# Patient Record
Sex: FEMALE | Race: WHITE | NOT HISPANIC OR LATINO | Employment: UNEMPLOYED | URBAN - METROPOLITAN AREA
[De-identification: names, ages, dates, MRNs, and addresses within clinical notes are randomized per-mention and may not be internally consistent; named-entity substitution may affect disease eponyms.]

---

## 2017-05-04 ENCOUNTER — OFFICE VISIT (OUTPATIENT)
Dept: OBSTETRICS AND GYNECOLOGY | Facility: CLINIC | Age: 27
End: 2017-05-04

## 2017-05-04 ENCOUNTER — PROCEDURE VISIT (OUTPATIENT)
Dept: OBSTETRICS AND GYNECOLOGY | Facility: CLINIC | Age: 27
End: 2017-05-04

## 2017-05-04 VITALS
DIASTOLIC BLOOD PRESSURE: 58 MMHG | SYSTOLIC BLOOD PRESSURE: 108 MMHG | WEIGHT: 111 LBS | HEIGHT: 62 IN | BODY MASS INDEX: 20.43 KG/M2
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
## 2017-05-04 DIAGNOSIS — Z34.81 ENCOUNTER FOR SUPERVISION OF OTHER NORMAL PREGNANCY IN FIRST TRIMESTER: ICD-10-CM

## 2017-05-04 DIAGNOSIS — Z98.891 HISTORY OF CESAREAN DELIVERY: ICD-10-CM

## 2017-05-04 DIAGNOSIS — Z36.87 UNSURE OF LMP (LAST MENSTRUAL PERIOD) AS REASON FOR ULTRASOUND SCAN: ICD-10-CM

## 2017-05-04 DIAGNOSIS — Z15.89 HETEROZYGOUS MTHFR MUTATION C677T: ICD-10-CM

## 2017-05-04 DIAGNOSIS — Z01.419 WELL FEMALE EXAM WITH ROUTINE GYNECOLOGICAL EXAM: Primary | ICD-10-CM

## 2017-05-04 DIAGNOSIS — Z13.9 SCREENING: ICD-10-CM

## 2017-05-04 DIAGNOSIS — Z12.4 PAP SMEAR FOR CERVICAL CANCER SCREENING: ICD-10-CM

## 2017-05-04 DIAGNOSIS — Z11.3 SCREEN FOR STD (SEXUALLY TRANSMITTED DISEASE): ICD-10-CM

## 2017-05-04 PROBLEM — O46.8X9 SUBCHORIONIC BLEED: Status: ACTIVE | Noted: 2017-05-04

## 2017-05-04 PROBLEM — Z34.90 SUPERVISION OF NORMAL PREGNANCY: Status: ACTIVE | Noted: 2017-05-04

## 2017-05-04 PROBLEM — Z87.59 HISTORY OF PLACENTA ABRUPTION: Status: ACTIVE | Noted: 2017-05-04

## 2017-05-04 PROBLEM — O41.8X90 SUBCHORIONIC BLEED: Status: ACTIVE | Noted: 2017-05-04

## 2017-05-04 LAB
ABDOMINAL CIRCUMFERENCE: NORMAL MM
BILIRUB BLD-MCNC: NEGATIVE MG/DL
CLARITY, POC: CLEAR
COLOR UR: YELLOW
FEMUR LENGTH: NORMAL MM
FET BPD.MEAN FROM CEREB DIAM: NORMAL MM
GLUCOSE UR STRIP-MCNC: NEGATIVE MG/DL
HEAD CIRCUMFERENCE: NORMAL MM
KETONES UR QL: NEGATIVE
LEUKOCYTE EST, POC: ABNORMAL
NITRITE UR-MCNC: NEGATIVE MG/ML
PH UR: 7.5 [PH] (ref 5–8)
PROT UR STRIP-MCNC: NEGATIVE MG/DL
RBC # UR STRIP: NEGATIVE /UL
SP GR UR: 1.02 (ref 1–1.03)
UROBILINOGEN UR QL: NORMAL

## 2017-05-04 PROCEDURE — 81002 URINALYSIS NONAUTO W/O SCOPE: CPT | Performed by: OBSTETRICS & GYNECOLOGY

## 2017-05-04 PROCEDURE — 99385 PREV VISIT NEW AGE 18-39: CPT | Performed by: OBSTETRICS & GYNECOLOGY

## 2017-05-04 PROCEDURE — 76817 TRANSVAGINAL US OBSTETRIC: CPT | Performed by: OBSTETRICS & GYNECOLOGY

## 2017-05-04 RX ORDER — CYCLOBENZAPRINE HCL 10 MG
10 TABLET ORAL EVERY 8 HOURS PRN
Qty: 30 TABLET | Refills: 1 | Status: SHIPPED | OUTPATIENT
Start: 2017-05-04 | End: 2017-05-14

## 2017-05-04 RX ORDER — METHYLERGONOVINE MALEATE 0.2 MG/1
TABLET ORAL
COMMUNITY
Start: 2017-01-27 | End: 2017-05-04

## 2017-05-05 LAB
ABO GROUP BLD: NORMAL
BLD GP AB SCN SERPL QL: NEGATIVE
CONV .: NORMAL
CYTOLOGIST CVX/VAG CYTO: NORMAL
CYTOLOGY CVX/VAG DOC THIN PREP: NORMAL
DX ICD CODE: NORMAL
ERYTHROCYTE [DISTWIDTH] IN BLOOD BY AUTOMATED COUNT: 14.5 % (ref 11.7–13)
HBA1C MFR BLD: 4.88 % (ref 4.8–5.6)
HBV SURFACE AG SERPL QL IA: NEGATIVE
HCT VFR BLD AUTO: 39.4 % (ref 35.6–45.5)
HCV AB S/CO SERPL IA: <0.1 S/CO RATIO (ref 0–0.9)
HGB A MFR BLD: 97.2 % (ref 94–98)
HGB A2 MFR BLD COLUMN CHROM: 2.8 % (ref 0.7–3.1)
HGB BLD-MCNC: 12.7 G/DL (ref 11.9–15.5)
HGB C MFR BLD: 0 %
HGB F MFR BLD: 0 % (ref 0–2)
HGB FRACT BLD-IMP: NORMAL
HGB S BLD QL SOLY: NEGATIVE
HGB S MFR BLD: 0 %
HIV 1 & 2 AB SER-IMP: NORMAL
HIV 1+2 AB+HIV1 P24 AG SERPL QL IA: NON REACTIVE
MCH RBC QN AUTO: 28 PG (ref 26.9–32)
MCHC RBC AUTO-ENTMCNC: 32.2 G/DL (ref 32.4–36.3)
MCV RBC AUTO: 87 FL (ref 80.5–98.2)
OTHER STN SPEC: NORMAL
PATH REPORT.FINAL DX SPEC: NORMAL
PLATELET # BLD AUTO: 263 10*3/MM3 (ref 140–500)
RBC # BLD AUTO: 4.53 10*6/MM3 (ref 3.9–5.2)
RH BLD: POSITIVE
RPR SER QL: NORMAL
RUBV IGG SERPL IA-ACNC: 1.1 INDEX
STAT OF ADQ CVX/VAG CYTO-IMP: NORMAL
VZV IGG SER IA-ACNC: 1838 INDEX
WBC # BLD AUTO: 7.45 10*3/MM3 (ref 4.5–10.7)

## 2017-05-06 LAB
BACTERIA UR CULT: NO GROWTH
BACTERIA UR CULT: NORMAL

## 2017-05-07 LAB
C TRACH RRNA SPEC QL NAA+PROBE: NEGATIVE
N GONORRHOEA RRNA SPEC QL NAA+PROBE: NEGATIVE
T VAGINALIS RRNA SPEC QL NAA+PROBE: NEGATIVE

## 2017-05-08 ENCOUNTER — TELEPHONE (OUTPATIENT)
Dept: OBSTETRICS AND GYNECOLOGY | Facility: CLINIC | Age: 27
End: 2017-05-08

## 2017-05-10 ENCOUNTER — TELEPHONE (OUTPATIENT)
Dept: OBSTETRICS AND GYNECOLOGY | Facility: CLINIC | Age: 27
End: 2017-05-10

## 2017-05-10 RX ORDER — PROMETHAZINE HYDROCHLORIDE 25 MG/1
25 TABLET ORAL EVERY 6 HOURS PRN
Qty: 30 TABLET | Refills: 2 | Status: SHIPPED | OUTPATIENT
Start: 2017-05-10 | End: 2017-09-01 | Stop reason: SDUPTHER

## 2017-05-10 RX ORDER — PROMETHAZINE HYDROCHLORIDE 25 MG/1
25 SUPPOSITORY RECTAL EVERY 6 HOURS PRN
Qty: 12 SUPPOSITORY | Refills: 1 | Status: SHIPPED | OUTPATIENT
Start: 2017-05-10 | End: 2017-06-06

## 2017-05-10 RX ORDER — DOXYLAMINE SUCCINATE AND PYRIDOXINE HYDROCHLORIDE, DELAYED RELEASE TABLETS 10 MG/10 MG 10; 10 MG/1; MG/1
2 TABLET, DELAYED RELEASE ORAL NIGHTLY PRN
Qty: 90 TABLET | Refills: 2 | Status: SHIPPED | OUTPATIENT
Start: 2017-05-10 | End: 2017-06-06

## 2017-06-06 ENCOUNTER — INITIAL PRENATAL (OUTPATIENT)
Dept: OBSTETRICS AND GYNECOLOGY | Facility: CLINIC | Age: 27
End: 2017-06-06

## 2017-06-06 VITALS — DIASTOLIC BLOOD PRESSURE: 64 MMHG | BODY MASS INDEX: 20.12 KG/M2 | SYSTOLIC BLOOD PRESSURE: 100 MMHG | WEIGHT: 110 LBS

## 2017-06-06 DIAGNOSIS — Z13.9 SCREENING: ICD-10-CM

## 2017-06-06 DIAGNOSIS — Z98.891 HISTORY OF CESAREAN DELIVERY: ICD-10-CM

## 2017-06-06 DIAGNOSIS — Z15.89 HETEROZYGOUS MTHFR MUTATION C677T: ICD-10-CM

## 2017-06-06 DIAGNOSIS — Z82.49 FAMILY HISTORY OF HEART BLOCK: ICD-10-CM

## 2017-06-06 DIAGNOSIS — Z34.80 ENCOUNTER FOR SUPERVISION OF OTHER NORMAL PREGNANCY: Primary | ICD-10-CM

## 2017-06-06 DIAGNOSIS — Z87.59 HISTORY OF PLACENTA ABRUPTION: ICD-10-CM

## 2017-06-06 PROBLEM — Z82.79 FAMILY HISTORY OF CONGENITAL HEART DEFECT: Status: ACTIVE | Noted: 2017-06-06

## 2017-06-06 LAB
BILIRUB BLD-MCNC: NEGATIVE MG/DL
CLARITY, POC: CLEAR
COLOR UR: YELLOW
GLUCOSE UR STRIP-MCNC: NEGATIVE MG/DL
KETONES UR QL: NEGATIVE
LEUKOCYTE EST, POC: NEGATIVE
NITRITE UR-MCNC: NEGATIVE MG/ML
PH UR: 7 [PH] (ref 5–8)
PROT UR STRIP-MCNC: NEGATIVE MG/DL
RBC # UR STRIP: NEGATIVE /UL
SP GR UR: 1.02 (ref 1–1.03)
UROBILINOGEN UR QL: NORMAL

## 2017-06-06 PROCEDURE — 81002 URINALYSIS NONAUTO W/O SCOPE: CPT | Performed by: OBSTETRICS & GYNECOLOGY

## 2017-06-06 PROCEDURE — 0502F SUBSEQUENT PRENATAL CARE: CPT | Performed by: OBSTETRICS & GYNECOLOGY

## 2017-06-06 RX ORDER — ASPIRIN 81 MG/1
81 TABLET ORAL DAILY
COMMUNITY
End: 2017-10-28 | Stop reason: HOSPADM

## 2017-06-06 NOTE — ASSESSMENT & PLAN NOTE
Brother with complete heart block is premature death  Sister with mitral valve prolapse  []  Fetal echo after 24 weeks

## 2017-06-06 NOTE — PROGRESS NOTES
Cc/no c/o/sc    OB intake completed  [x]  Reviewed dating  [x]  Reviewed medical history  [x]  Reviewed genetic and infection history  [x]  Obstetrical history reviewed/delivery notes requested.

## 2017-06-07 LAB
CARDIOLIPIN IGG SER IA-ACNC: <9 GPL U/ML (ref 0–14)
CARDIOLIPIN IGM SER IA-ACNC: <9 MPL U/ML (ref 0–12)
ENA SS-A AB SER-ACNC: <0.2 AI (ref 0–0.9)
ENA SS-B AB SER-ACNC: <0.2 AI (ref 0–0.9)

## 2017-06-08 ENCOUNTER — TELEPHONE (OUTPATIENT)
Dept: OBSTETRICS AND GYNECOLOGY | Facility: CLINIC | Age: 27
End: 2017-06-08

## 2017-06-08 NOTE — TELEPHONE ENCOUNTER
----- Message from Wm Barraza MD sent at 6/7/2017  9:14 PM EDT -----  Please call:  Screening labs for autoimmune disease that effect the heart are negative

## 2017-07-14 ENCOUNTER — ROUTINE PRENATAL (OUTPATIENT)
Dept: OBSTETRICS AND GYNECOLOGY | Facility: CLINIC | Age: 27
End: 2017-07-14

## 2017-07-14 VITALS — BODY MASS INDEX: 20.85 KG/M2 | WEIGHT: 114 LBS | SYSTOLIC BLOOD PRESSURE: 90 MMHG | DIASTOLIC BLOOD PRESSURE: 54 MMHG

## 2017-07-14 DIAGNOSIS — Z15.89 HETEROZYGOUS MTHFR MUTATION C677T: ICD-10-CM

## 2017-07-14 DIAGNOSIS — O99.519 ASTHMA AFFECTING PREGNANCY, ANTEPARTUM: ICD-10-CM

## 2017-07-14 DIAGNOSIS — Z13.9 SCREENING: ICD-10-CM

## 2017-07-14 DIAGNOSIS — O09.92 HIGH-RISK PREGNANCY SUPERVISION, SECOND TRIMESTER: Primary | ICD-10-CM

## 2017-07-14 DIAGNOSIS — I34.0 MITRAL REGURGITATION OF MOTHER DURING PREGNANCY: ICD-10-CM

## 2017-07-14 DIAGNOSIS — Z98.891 HISTORY OF CESAREAN DELIVERY: ICD-10-CM

## 2017-07-14 DIAGNOSIS — O99.419 MITRAL REGURGITATION OF MOTHER DURING PREGNANCY: ICD-10-CM

## 2017-07-14 DIAGNOSIS — J45.909 ASTHMA AFFECTING PREGNANCY, ANTEPARTUM: ICD-10-CM

## 2017-07-14 DIAGNOSIS — Z82.79 FAMILY HISTORY OF CONGENITAL HEART DEFECT: ICD-10-CM

## 2017-07-14 DIAGNOSIS — Z87.59 HISTORY OF PLACENTA ABRUPTION: ICD-10-CM

## 2017-07-14 PROBLEM — O09.90 HIGH-RISK PREGNANCY SUPERVISION: Status: ACTIVE | Noted: 2017-05-04

## 2017-07-14 PROBLEM — Z01.419 WELL FEMALE EXAM WITH ROUTINE GYNECOLOGICAL EXAM: Status: RESOLVED | Noted: 2017-05-04 | Resolved: 2017-07-14

## 2017-07-14 LAB
BILIRUB BLD-MCNC: NEGATIVE MG/DL
CLARITY, POC: CLEAR
COLOR UR: YELLOW
EXTERNAL GENETIC TESTING, MATERNAL BLOOD: NORMAL
GLUCOSE UR STRIP-MCNC: NEGATIVE MG/DL
KETONES UR QL: NEGATIVE
LEUKOCYTE EST, POC: ABNORMAL
NITRITE UR-MCNC: NEGATIVE MG/ML
PH UR: 7 [PH] (ref 5–8)
PROT UR STRIP-MCNC: NEGATIVE MG/DL
RBC # UR STRIP: NEGATIVE /UL
SP GR UR: 1.02 (ref 1–1.03)
UROBILINOGEN UR QL: NORMAL

## 2017-07-14 PROCEDURE — 81002 URINALYSIS NONAUTO W/O SCOPE: CPT | Performed by: OBSTETRICS & GYNECOLOGY

## 2017-07-14 PROCEDURE — 0502F SUBSEQUENT PRENATAL CARE: CPT | Performed by: OBSTETRICS & GYNECOLOGY

## 2017-07-14 NOTE — PROGRESS NOTES
Pt states no c/o  Patient has mitral regurg - followed by cardiology - next appt October - need records  H/o csection - repeat at 39 weeks - wants to avoid 12/19 due to sister passing at that age   MTHFR - cont ASA   H/o placental abruption  Family h/o congenital heart defect - to [facility] for consult and anatomy   Declines MS-AFP 4   3 weeks

## 2017-07-26 ENCOUNTER — HOSPITAL ENCOUNTER (OUTPATIENT)
Dept: ULTRASOUND IMAGING | Facility: HOSPITAL | Age: 27
Discharge: HOME | End: 2017-07-26
Attending: OBSTETRICS & GYNECOLOGY | Admitting: OBSTETRICS & GYNECOLOGY

## 2017-07-26 DIAGNOSIS — I34.0 MITRAL REGURGITATION OF MOTHER DURING PREGNANCY: ICD-10-CM

## 2017-07-26 DIAGNOSIS — O99.419 MITRAL REGURGITATION OF MOTHER DURING PREGNANCY: ICD-10-CM

## 2017-07-26 DIAGNOSIS — Z15.89 HETEROZYGOUS MTHFR MUTATION C677T: ICD-10-CM

## 2017-07-26 DIAGNOSIS — Z82.79 FAMILY HISTORY OF CONGENITAL HEART DEFECT: ICD-10-CM

## 2017-07-26 DIAGNOSIS — Z87.59 HISTORY OF PLACENTA ABRUPTION: ICD-10-CM

## 2017-07-26 PROCEDURE — 76819 FETAL BIOPHYS PROFIL W/O NST: CPT

## 2017-07-26 PROCEDURE — 76811 OB US DETAILED SNGL FETUS: CPT

## 2017-07-26 NOTE — CONSULTS
"Ms. Robles is referred by Dr. Le for M consultation on indication of family history of congenital heart disease.    27 G3 P 0111 at 18 4/7 per DENISE 17 L= 6      Prenatal course is significant for   Pt saw cardiologist Dr. Pierce two weeks ago.  Pt states she was told her mitral regug. is stable.  F/U appointment is scheduled in October.    PMH   Ob  G1   37 2/7  LTCS  Abruption  6# 10oz Thrombophilia W/U was neg except for MTHFR  G2  2017 SAB  G3 current    Ills  Asthma:  No flare ups in a long time.  Has not used an inhaler in > 1 year    Alls  NKMA    Ops  Prior c-s    Meds  Phenergan  ASA 81 mg  PNV    Fhx: is negative for mental retardation, trisomy 21, spina bifida, cystic fibrosis, [employer] ancestry.   Pt states she has been tested three times for CF but has not had a cystic fibrosis carrier screen.   I provided a copy of the pamphlet \"Carrier Testing for Common Genetic Diseases.\"  I briefly described cystic fibrosis and spinal muscle atrophy including availability of carrier screening for these conditions thru her obstetrician's office.    Regarding fhx heart disease:  Pt's brother  at age 28 in his sleep; he had pace maker since age 4  (Sister passed away age 24; pneumonia.)  Pt has mitral regurgitation  Brother has sinus bradycardia.  2 nephews have 1) PDA 2)aortic stenosis    O:  62\" 114#    General appearance:  A&Ox3 pleasant WF in mid 2nd trimester, appears comfortable      Abdomen:  Fundal height is appropriate for gestational age.  Uterus is nontender    US:  See report    A:  18 4/7 per DENISE 17  Hx abruption; 10-20% recurrence risk. Abruption is not predictable.  Thrombophilia evaluation was negative.  10-30% of the population has MTHFR; testing for MTHFR is no longer indicated.      Prior   Family history of heart anomalies.     P;  Suggest screening for Digeorge syndrome (22 q11.2 micro deletion) per extensive family history of cardiac problems.   Recommend evaluation " of interval growth at 28 weeks to include additional views of heart.     Evaluate interval growth at 28 weeks to include additional views of the heart at that time.     NICHD consensus conference recommendations for late , early term delivery (Obstet Gynecol 2011:118:323-33) did not address history of abruption. I suggest delivery at 36-37 weeks after BMZ, similar to delivery timing for other acute potentially castastrophic conditions such as placenta previa.      Recommend inform  care provider of the family history of congenital heart anomaly.  Due to known limitations of fetal echocardiogram,  echocardiogram is recommended prior to hospital discharge, as it is the most sensitive exam for congenital heart disease.    For billing purposes, duration of face to face consultation was approximately 30 minutes of which > 50% was devoted to patient counseling and coordination of care, exclusive of US exam.

## 2017-07-27 ENCOUNTER — TRANSCRIBE ORDERS (OUTPATIENT)
Dept: ADMINISTRATIVE | Facility: HOSPITAL | Age: 27
End: 2017-07-27

## 2017-07-27 DIAGNOSIS — Z82.79 FAMILY HISTORY OF CONGENITAL ANOMALIES: ICD-10-CM

## 2017-07-27 DIAGNOSIS — Z82.79 FAMILY HISTORY OF CONGENITAL ANOMALIES: Primary | ICD-10-CM

## 2017-08-03 ENCOUNTER — ROUTINE PRENATAL (OUTPATIENT)
Dept: OBSTETRICS AND GYNECOLOGY | Facility: CLINIC | Age: 27
End: 2017-08-03

## 2017-08-03 VITALS — WEIGHT: 113 LBS | BODY MASS INDEX: 20.67 KG/M2 | SYSTOLIC BLOOD PRESSURE: 98 MMHG | DIASTOLIC BLOOD PRESSURE: 60 MMHG

## 2017-08-03 DIAGNOSIS — Z98.891 HISTORY OF CESAREAN DELIVERY: ICD-10-CM

## 2017-08-03 DIAGNOSIS — O09.92 HIGH-RISK PREGNANCY SUPERVISION, SECOND TRIMESTER: Primary | ICD-10-CM

## 2017-08-03 DIAGNOSIS — Z15.89 HETEROZYGOUS MTHFR MUTATION C677T: ICD-10-CM

## 2017-08-03 DIAGNOSIS — O99.891 BACK PAIN AFFECTING PREGNANCY: ICD-10-CM

## 2017-08-03 DIAGNOSIS — O99.419 MITRAL REGURGITATION OF MOTHER DURING PREGNANCY: ICD-10-CM

## 2017-08-03 DIAGNOSIS — Z82.79 FAMILY HISTORY OF CONGENITAL HEART DEFECT: ICD-10-CM

## 2017-08-03 DIAGNOSIS — Z13.9 SCREENING: ICD-10-CM

## 2017-08-03 DIAGNOSIS — I34.0 MITRAL REGURGITATION OF MOTHER DURING PREGNANCY: ICD-10-CM

## 2017-08-03 DIAGNOSIS — J45.909 ASTHMA AFFECTING PREGNANCY, ANTEPARTUM: ICD-10-CM

## 2017-08-03 DIAGNOSIS — O99.519 ASTHMA AFFECTING PREGNANCY, ANTEPARTUM: ICD-10-CM

## 2017-08-03 DIAGNOSIS — Z87.59 HISTORY OF PLACENTA ABRUPTION: ICD-10-CM

## 2017-08-03 DIAGNOSIS — M54.9 BACK PAIN AFFECTING PREGNANCY: ICD-10-CM

## 2017-08-03 LAB
BILIRUB BLD-MCNC: NEGATIVE MG/DL
CLARITY, POC: CLEAR
COLOR UR: YELLOW
GLUCOSE UR STRIP-MCNC: NEGATIVE MG/DL
KETONES UR QL: NEGATIVE
LEUKOCYTE EST, POC: NEGATIVE
NITRITE UR-MCNC: NEGATIVE MG/ML
PH UR: 7.5 [PH] (ref 5–8)
PROT UR STRIP-MCNC: NEGATIVE MG/DL
RBC # UR STRIP: NEGATIVE /UL
SP GR UR: 1.02 (ref 1–1.03)
UROBILINOGEN UR QL: NORMAL

## 2017-08-03 PROCEDURE — 81002 URINALYSIS NONAUTO W/O SCOPE: CPT | Performed by: OBSTETRICS & GYNECOLOGY

## 2017-08-03 PROCEDURE — 0502F SUBSEQUENT PRENATAL CARE: CPT | Performed by: OBSTETRICS & GYNECOLOGY

## 2017-08-03 NOTE — PROGRESS NOTES
Cc/ back pain/sc  To PT for back pain - brochure given  Mild to mod regurg - see cards records - repeat ECHO 4 months   H/o congenital heart defect -  ECHO rec   Discussed DiGeorge testing - pat declines   H/o placental abruption - MFM rec delivery at 36-37 weeks after steroids   4 weeks

## 2017-09-01 ENCOUNTER — ROUTINE PRENATAL (OUTPATIENT)
Dept: OBSTETRICS AND GYNECOLOGY | Facility: CLINIC | Age: 27
End: 2017-09-01

## 2017-09-01 VITALS — BODY MASS INDEX: 22.28 KG/M2 | SYSTOLIC BLOOD PRESSURE: 98 MMHG | WEIGHT: 121.8 LBS | DIASTOLIC BLOOD PRESSURE: 54 MMHG

## 2017-09-01 DIAGNOSIS — Z13.9 SCREENING: ICD-10-CM

## 2017-09-01 DIAGNOSIS — Z98.891 HISTORY OF CESAREAN DELIVERY: ICD-10-CM

## 2017-09-01 DIAGNOSIS — Z82.79 FAMILY HISTORY OF CONGENITAL HEART DEFECT: ICD-10-CM

## 2017-09-01 DIAGNOSIS — Z87.59 HISTORY OF PLACENTA ABRUPTION: ICD-10-CM

## 2017-09-01 DIAGNOSIS — M54.9 BACK PAIN AFFECTING PREGNANCY: ICD-10-CM

## 2017-09-01 DIAGNOSIS — O21.9 NAUSEA AND VOMITING IN PREGNANCY: ICD-10-CM

## 2017-09-01 DIAGNOSIS — O99.419 MITRAL REGURGITATION OF MOTHER DURING PREGNANCY: ICD-10-CM

## 2017-09-01 DIAGNOSIS — I34.0 MITRAL REGURGITATION OF MOTHER DURING PREGNANCY: ICD-10-CM

## 2017-09-01 DIAGNOSIS — O09.92 HIGH-RISK PREGNANCY SUPERVISION, SECOND TRIMESTER: Primary | ICD-10-CM

## 2017-09-01 DIAGNOSIS — O99.891 BACK PAIN AFFECTING PREGNANCY: ICD-10-CM

## 2017-09-01 PROCEDURE — 81002 URINALYSIS NONAUTO W/O SCOPE: CPT | Performed by: OBSTETRICS & GYNECOLOGY

## 2017-09-01 PROCEDURE — 0502F SUBSEQUENT PRENATAL CARE: CPT | Performed by: OBSTETRICS & GYNECOLOGY

## 2017-09-01 RX ORDER — PROMETHAZINE HYDROCHLORIDE 25 MG/1
25 TABLET ORAL EVERY 6 HOURS PRN
Qty: 30 TABLET | Refills: 2 | Status: SHIPPED | OUTPATIENT
Start: 2017-09-01 | End: 2017-10-28 | Stop reason: HOSPADM

## 2017-09-01 NOTE — PROGRESS NOTES
CC: pt states she is having a headache that has lasted for 3 days now. Pt is needing a refill on her Phenergan. VM CMA     HPI: 27 y.o.  at 23w6d c/ o H/A - denies ctx, vag bleeding, loss of fluid. +FM .   [x]  Vitals reviewed    ROS:  GI:  Negative  : neg  Pulmonary: Negative     A/P  1. Intrauterine pregnancy at 23w6d   2. Pregnancy Risk:  HIGH RISK  Daphne was seen today for routine prenatal visit.    Diagnoses and all orders for this visit:    High-risk pregnancy supervision, second trimester  -     [facility]; Future    Screening  -     POC Urinalysis Dipstick    Nausea and vomiting in pregnancy  -     promethazine (PHENERGAN) 25 MG tablet; Take 1 tablet by mouth Every 6 (Six) Hours As Needed for Nausea or Vomiting for up to 30 doses.    Back pain affecting pregnancy    Mitral regurgitation of mother during pregnancy  -     [facility]; Future    Family history of congenital heart defect  -     [facility]; Future    History of placenta abruption  -     [facility]; Future    History of  delivery            PLAN:   Return in about 4 weeks (around 2017), or one- hour gtt and ob check .   Mitral regurg - saw cards - repeat ECHO 4 weeks   H/o placental abruption - MFM rec delivery 36-37 weeks after steroids   H/o congenital heart defect -  echo  Headaches - rec increased H2O intake, freq snacks and Tylenol  Prev csection - desires repeat           Delores Le MD  2017 2:54 PM

## 2017-10-04 ENCOUNTER — ROUTINE PRENATAL (OUTPATIENT)
Dept: OBSTETRICS AND GYNECOLOGY | Facility: CLINIC | Age: 27
End: 2017-10-04

## 2017-10-04 ENCOUNTER — HOSPITAL ENCOUNTER (OUTPATIENT)
Dept: ULTRASOUND IMAGING | Facility: HOSPITAL | Age: 27
Discharge: HOME | End: 2017-10-04
Attending: OBSTETRICS & GYNECOLOGY | Admitting: OBSTETRICS & GYNECOLOGY

## 2017-10-04 VITALS — BODY MASS INDEX: 22.86 KG/M2 | SYSTOLIC BLOOD PRESSURE: 96 MMHG | WEIGHT: 125 LBS | DIASTOLIC BLOOD PRESSURE: 52 MMHG

## 2017-10-04 DIAGNOSIS — O99.419 MITRAL REGURGITATION OF MOTHER DURING PREGNANCY: ICD-10-CM

## 2017-10-04 DIAGNOSIS — Z82.79 FAMILY HISTORY OF CONGENITAL HEART DEFECT: ICD-10-CM

## 2017-10-04 DIAGNOSIS — Z87.59 HISTORY OF PLACENTA ABRUPTION: ICD-10-CM

## 2017-10-04 DIAGNOSIS — I34.0 MITRAL REGURGITATION OF MOTHER DURING PREGNANCY: ICD-10-CM

## 2017-10-04 DIAGNOSIS — Z82.79 FAMILY HISTORY OF CONGENITAL ANOMALIES: ICD-10-CM

## 2017-10-04 DIAGNOSIS — O99.519 ASTHMA AFFECTING PREGNANCY, ANTEPARTUM: ICD-10-CM

## 2017-10-04 DIAGNOSIS — Z13.89 SCREENING FOR BLOOD OR PROTEIN IN URINE: ICD-10-CM

## 2017-10-04 DIAGNOSIS — J45.909 ASTHMA AFFECTING PREGNANCY, ANTEPARTUM: ICD-10-CM

## 2017-10-04 DIAGNOSIS — Z15.89 HETEROZYGOUS MTHFR MUTATION C677T: ICD-10-CM

## 2017-10-04 DIAGNOSIS — O09.93 SUPERVISION OF HIGH RISK PREGNANCY IN THIRD TRIMESTER: Primary | ICD-10-CM

## 2017-10-04 DIAGNOSIS — Z98.891 HISTORY OF CESAREAN DELIVERY: ICD-10-CM

## 2017-10-04 DIAGNOSIS — Z13.0 SCREENING FOR IRON DEFICIENCY ANEMIA: ICD-10-CM

## 2017-10-04 DIAGNOSIS — Z23 NEED FOR VACCINATION: ICD-10-CM

## 2017-10-04 DIAGNOSIS — Z13.1 SCREENING FOR DIABETES MELLITUS: ICD-10-CM

## 2017-10-04 LAB
BILIRUB BLD-MCNC: NEGATIVE MG/DL
CLARITY, POC: CLEAR
COLOR UR: YELLOW
GLUCOSE 1H P 50 G GLC PO SERPL-MCNC: 110 MG/DL (ref 65–139)
GLUCOSE UR STRIP-MCNC: NEGATIVE MG/DL
HCT VFR BLD AUTO: 30.9 % (ref 35.6–45.5)
HGB BLD-MCNC: 10.1 G/DL (ref 11.9–15.5)
KETONES UR QL: NEGATIVE
LEUKOCYTE EST, POC: ABNORMAL
NITRITE UR-MCNC: NEGATIVE MG/ML
PH UR: 7 [PH] (ref 5–8)
PROT UR STRIP-MCNC: NEGATIVE MG/DL
RBC # UR STRIP: NEGATIVE /UL
SP GR UR: 1.02 (ref 1–1.03)
UROBILINOGEN UR QL: NORMAL

## 2017-10-04 PROCEDURE — 90715 TDAP VACCINE 7 YRS/> IM: CPT | Performed by: OBSTETRICS & GYNECOLOGY

## 2017-10-04 PROCEDURE — 76816 OB US FOLLOW-UP PER FETUS: CPT

## 2017-10-04 PROCEDURE — 0502F SUBSEQUENT PRENATAL CARE: CPT | Performed by: OBSTETRICS & GYNECOLOGY

## 2017-10-04 PROCEDURE — 90471 IMMUNIZATION ADMIN: CPT | Performed by: OBSTETRICS & GYNECOLOGY

## 2017-10-04 PROCEDURE — 81002 URINALYSIS NONAUTO W/O SCOPE: CPT | Performed by: OBSTETRICS & GYNECOLOGY

## 2017-10-04 RX ORDER — METHYLERGONOVINE MALEATE 0.2 MG/ML
200 INJECTION INTRAVENOUS AS NEEDED
Status: CANCELLED | OUTPATIENT
Start: 2017-10-04

## 2017-10-04 RX ORDER — LIDOCAINE HYDROCHLORIDE 10 MG/ML
5 INJECTION, SOLUTION INFILTRATION; PERINEURAL AS NEEDED
Status: CANCELLED | OUTPATIENT
Start: 2017-10-04

## 2017-10-04 RX ORDER — ONDANSETRON 2 MG/ML
4 INJECTION INTRAMUSCULAR; INTRAVENOUS EVERY 6 HOURS PRN
Status: CANCELLED | OUTPATIENT
Start: 2017-10-04

## 2017-10-04 RX ORDER — CARBOPROST TROMETHAMINE 250 UG/ML
250 INJECTION, SOLUTION INTRAMUSCULAR AS NEEDED
Status: CANCELLED | OUTPATIENT
Start: 2017-10-04

## 2017-10-04 RX ORDER — IBUPROFEN 200 MG
600 TABLET ORAL EVERY 6 HOURS PRN
Status: CANCELLED | OUTPATIENT
Start: 2017-10-04

## 2017-10-04 RX ORDER — SODIUM CHLORIDE 0.9 % (FLUSH) 0.9 %
1-10 SYRINGE (ML) INJECTION AS NEEDED
Status: CANCELLED | OUTPATIENT
Start: 2017-10-04

## 2017-10-04 RX ORDER — OXYCODONE HYDROCHLORIDE AND ACETAMINOPHEN 5; 325 MG/1; MG/1
1 TABLET ORAL EVERY 4 HOURS PRN
Status: CANCELLED | OUTPATIENT
Start: 2017-10-04 | End: 2017-10-14

## 2017-10-04 RX ORDER — ONDANSETRON 4 MG/1
4 TABLET, ORALLY DISINTEGRATING ORAL EVERY 6 HOURS PRN
Status: CANCELLED | OUTPATIENT
Start: 2017-10-04

## 2017-10-04 RX ORDER — ONDANSETRON 4 MG/1
4 TABLET, FILM COATED ORAL EVERY 6 HOURS PRN
Status: CANCELLED | OUTPATIENT
Start: 2017-10-04

## 2017-10-04 RX ORDER — SODIUM CHLORIDE, SODIUM LACTATE, POTASSIUM CHLORIDE, CALCIUM CHLORIDE 600; 310; 30; 20 MG/100ML; MG/100ML; MG/100ML; MG/100ML
125 INJECTION, SOLUTION INTRAVENOUS CONTINUOUS
Status: CANCELLED | OUTPATIENT
Start: 2017-10-04

## 2017-10-04 NOTE — PROGRESS NOTES
Pt states no c/o. Pt received 1 hr gtt/tdap and h&h today. VM CMA   H/O placental abruption - MFM rec delivery 36-37 weeks with steroids prior - plan csection 11/28 with steroids 11/26 and 11/27 in am   US at [facility] today for growth   Mitral regurd - cards appt 10/25  2 weeks  PTL warnings

## 2017-10-05 ENCOUNTER — TELEPHONE (OUTPATIENT)
Dept: OBSTETRICS AND GYNECOLOGY | Facility: CLINIC | Age: 27
End: 2017-10-05

## 2017-10-05 DIAGNOSIS — O99.019 ANEMIA AFFECTING PREGNANCY, ANTEPARTUM: Primary | ICD-10-CM

## 2017-10-05 RX ORDER — FERROUS SULFATE 325(65) MG
325 TABLET ORAL 2 TIMES DAILY WITH MEALS
Qty: 60 TABLET | Refills: 5 | Status: SHIPPED | OUTPATIENT
Start: 2017-10-05 | End: 2017-11-08

## 2017-10-05 NOTE — TELEPHONE ENCOUNTER
Spoke with Daphne and told her that her 1 hr gtt was normal but she is anemic. Pt was advised that rx for iron has been sent to the pharmacy. Pt expressed understanding.

## 2017-10-05 NOTE — TELEPHONE ENCOUNTER
----- Message from Delores Le MD sent at 10/5/2017  7:26 AM EDT -----  Please call patient and notify of normal results of one-hour but anemic - Iron sent to pharmacy

## 2017-10-10 ENCOUNTER — ROUTINE PRENATAL (OUTPATIENT)
Dept: OBSTETRICS AND GYNECOLOGY | Facility: CLINIC | Age: 27
End: 2017-10-10

## 2017-10-10 VITALS — DIASTOLIC BLOOD PRESSURE: 70 MMHG | BODY MASS INDEX: 23.41 KG/M2 | SYSTOLIC BLOOD PRESSURE: 112 MMHG | WEIGHT: 128 LBS

## 2017-10-10 DIAGNOSIS — O99.519 ASTHMA AFFECTING PREGNANCY, ANTEPARTUM: ICD-10-CM

## 2017-10-10 DIAGNOSIS — Z98.891 HISTORY OF CESAREAN DELIVERY: ICD-10-CM

## 2017-10-10 DIAGNOSIS — J45.909 ASTHMA AFFECTING PREGNANCY, ANTEPARTUM: ICD-10-CM

## 2017-10-10 DIAGNOSIS — Z15.89 HETEROZYGOUS MTHFR MUTATION C677T: ICD-10-CM

## 2017-10-10 DIAGNOSIS — I34.0 MITRAL REGURGITATION OF MOTHER DURING PREGNANCY: ICD-10-CM

## 2017-10-10 DIAGNOSIS — O99.013 ANEMIA AFFECTING PREGNANCY IN THIRD TRIMESTER: ICD-10-CM

## 2017-10-10 DIAGNOSIS — Z13.89 SCREENING FOR BLOOD OR PROTEIN IN URINE: Primary | ICD-10-CM

## 2017-10-10 DIAGNOSIS — Z87.59 HISTORY OF PLACENTA ABRUPTION: ICD-10-CM

## 2017-10-10 DIAGNOSIS — O99.419 MITRAL REGURGITATION OF MOTHER DURING PREGNANCY: ICD-10-CM

## 2017-10-10 DIAGNOSIS — O09.93 SUPERVISION OF HIGH RISK PREGNANCY IN THIRD TRIMESTER: ICD-10-CM

## 2017-10-10 DIAGNOSIS — Z82.79 FAMILY HISTORY OF CONGENITAL HEART DEFECT: ICD-10-CM

## 2017-10-10 LAB
BILIRUB BLD-MCNC: NEGATIVE MG/DL
CLARITY, POC: CLEAR
COLOR UR: YELLOW
GLUCOSE UR STRIP-MCNC: NEGATIVE MG/DL
KETONES UR QL: NEGATIVE
LEUKOCYTE EST, POC: ABNORMAL
NITRITE UR-MCNC: NEGATIVE MG/ML
PH UR: 7 [PH] (ref 5–8)
PROT UR STRIP-MCNC: NEGATIVE MG/DL
RBC # UR STRIP: NEGATIVE /UL
SP GR UR: 1.01 (ref 1–1.03)
UROBILINOGEN UR QL: NORMAL

## 2017-10-10 PROCEDURE — 0502F SUBSEQUENT PRENATAL CARE: CPT | Performed by: OBSTETRICS & GYNECOLOGY

## 2017-10-10 PROCEDURE — 81002 URINALYSIS NONAUTO W/O SCOPE: CPT | Performed by: OBSTETRICS & GYNECOLOGY

## 2017-10-10 PROCEDURE — 90471 IMMUNIZATION ADMIN: CPT | Performed by: OBSTETRICS & GYNECOLOGY

## 2017-10-10 PROCEDURE — 90656 IIV3 VACC NO PRSV 0.5 ML IM: CPT | Performed by: OBSTETRICS & GYNECOLOGY

## 2017-10-10 NOTE — PROGRESS NOTES
Cc:  No c/o. Flu vaccine given.rlr  Fetal echo wnl on 10/4/17 - rec  ECHO   Normal growth on [facility] US at 38% on 10/4/17   Patient to see cards again on 10/25   PTL warnings   RLTCS at 36-37 weeks per [facility] -

## 2017-10-25 ENCOUNTER — HOSPITAL ENCOUNTER (INPATIENT)
Facility: HOSPITAL | Age: 27
LOS: 3 days | Discharge: HOME | End: 2017-10-28
Attending: OBSTETRICS & GYNECOLOGY | Admitting: OBSTETRICS & GYNECOLOGY

## 2017-10-25 ENCOUNTER — ANESTHESIA (OUTPATIENT)
Dept: LABOR AND DELIVERY | Facility: HOSPITAL | Age: 27
End: 2017-10-25

## 2017-10-25 ENCOUNTER — ROUTINE PRENATAL (OUTPATIENT)
Dept: OBSTETRICS AND GYNECOLOGY | Facility: CLINIC | Age: 27
End: 2017-10-25

## 2017-10-25 ENCOUNTER — ANESTHESIA EVENT (OUTPATIENT)
Dept: LABOR AND DELIVERY | Facility: HOSPITAL | Age: 27
End: 2017-10-25

## 2017-10-25 VITALS — DIASTOLIC BLOOD PRESSURE: 72 MMHG | BODY MASS INDEX: 24.07 KG/M2 | SYSTOLIC BLOOD PRESSURE: 118 MMHG | WEIGHT: 131.6 LBS

## 2017-10-25 DIAGNOSIS — O99.419 MITRAL REGURGITATION OF MOTHER DURING PREGNANCY: ICD-10-CM

## 2017-10-25 DIAGNOSIS — O99.519 ASTHMA AFFECTING PREGNANCY, ANTEPARTUM: ICD-10-CM

## 2017-10-25 DIAGNOSIS — Z13.89 SCREENING FOR BLOOD OR PROTEIN IN URINE: ICD-10-CM

## 2017-10-25 DIAGNOSIS — Z98.891 HISTORY OF CESAREAN DELIVERY: ICD-10-CM

## 2017-10-25 DIAGNOSIS — I34.0 MITRAL REGURGITATION OF MOTHER DURING PREGNANCY: ICD-10-CM

## 2017-10-25 DIAGNOSIS — Z87.59 HISTORY OF PLACENTA ABRUPTION: ICD-10-CM

## 2017-10-25 DIAGNOSIS — O99.013 ANEMIA AFFECTING PREGNANCY IN THIRD TRIMESTER: ICD-10-CM

## 2017-10-25 DIAGNOSIS — O36.8390 ANTEPARTUM FETAL BRADYCARDIA AFFECTING CARE OF MOTHER: ICD-10-CM

## 2017-10-25 DIAGNOSIS — O09.93 SUPERVISION OF HIGH RISK PREGNANCY IN THIRD TRIMESTER: Primary | ICD-10-CM

## 2017-10-25 DIAGNOSIS — J45.909 ASTHMA AFFECTING PREGNANCY, ANTEPARTUM: ICD-10-CM

## 2017-10-25 PROBLEM — O34.219 PREVIOUS CESAREAN DELIVERY AFFECTING PREGNANCY: Status: ACTIVE | Noted: 2017-10-25

## 2017-10-25 LAB
ABO GROUP BLD: NORMAL
ATMOSPHERIC PRESS: 745.7 MMHG
ATMOSPHERIC PRESS: 745.7 MMHG
ATMOSPHERIC PRESS: 749.4 MMHG
BASE EXCESS BLDCOA CALC-SCNC: -2.7 MMOL/L
BASE EXCESS BLDCOA CALC-SCNC: -2.7 MMOL/L
BASE EXCESS BLDCOV CALC-SCNC: -1.3 MMOL/L (ref -30–30)
BDY SITE: ABNORMAL
BILIRUB BLD-MCNC: NEGATIVE MG/DL
BLD GP AB SCN SERPL QL: NEGATIVE
CLARITY, POC: CLEAR
COLOR UR: YELLOW
DEPRECATED RDW RBC AUTO: 43.6 FL (ref 37–54)
ERYTHROCYTE [DISTWIDTH] IN BLOOD BY AUTOMATED COUNT: 13.2 % (ref 11.7–13)
GLUCOSE UR STRIP-MCNC: NEGATIVE MG/DL
HCO3 BLDCOA-SCNC: 22.7 MMOL/L (ref 22–28)
HCO3 BLDCOA-SCNC: 22.7 MMOL/L (ref 22–28)
HCO3 BLDCOV-SCNC: 26 MMOL/L
HCT VFR BLD AUTO: 36.7 % (ref 35.6–45.5)
HGB BLD-MCNC: 11.9 G/DL (ref 11.9–15.5)
KETONES UR QL: NEGATIVE
LEUKOCYTE EST, POC: NEGATIVE
MCH RBC QN AUTO: 29.8 PG (ref 26.9–32)
MCHC RBC AUTO-ENTMCNC: 32.4 G/DL (ref 32.4–36.3)
MCV RBC AUTO: 91.8 FL (ref 80.5–98.2)
MODALITY: ABNORMAL
NITRITE UR-MCNC: NEGATIVE MG/ML
PCO2 BLDCOA: 40.7 MMHG
PCO2 BLDCOA: 40.7 MMHG
PCO2 BLDCOV: 52.2 MM HG (ref 35–51.3)
PH BLDCOA: 7.36 PH UNITS (ref 7.18–7.34)
PH BLDCOA: 7.36 PH UNITS (ref 7.18–7.34)
PH BLDCOV: 7.3 PH UNITS (ref 7.26–7.4)
PH UR: 6 [PH] (ref 5–8)
PLATELET # BLD AUTO: 249 10*3/MM3 (ref 140–500)
PMV BLD AUTO: 11.5 FL (ref 6–12)
PO2 BLDCOA: 29.8 MMHG (ref 12–26)
PO2 BLDCOA: 29.8 MMHG (ref 12–26)
PO2 BLDCOV: <25.2 MM HG (ref 19–39)
PROT UR STRIP-MCNC: NEGATIVE MG/DL
RBC # BLD AUTO: 4 10*6/MM3 (ref 3.9–5.2)
RBC # UR STRIP: NEGATIVE /UL
RH BLD: POSITIVE
SAO2 % BLDCOA: 18.2 % (ref 92–99)
SAO2 % BLDCOA: 54 % (ref 92–99)
SAO2 % BLDCOA: 54 % (ref 92–99)
SAO2 % BLDCOV: ABNORMAL %
SP GR UR: 1.03 (ref 1–1.03)
UROBILINOGEN UR QL: NORMAL
WBC NRBC COR # BLD: 10.8 10*3/MM3 (ref 4.5–10.7)

## 2017-10-25 PROCEDURE — 82803 BLOOD GASES ANY COMBINATION: CPT

## 2017-10-25 PROCEDURE — 25010000002 HYDROMORPHONE PER 4 MG

## 2017-10-25 PROCEDURE — 25010000002 BETAMETHASONE ACET & SOD PHOS PER 4 MG: Performed by: OBSTETRICS & GYNECOLOGY

## 2017-10-25 PROCEDURE — 25010000002 MORPHINE PER 10 MG: Performed by: NURSE ANESTHETIST, CERTIFIED REGISTERED

## 2017-10-25 PROCEDURE — 25010000002 PROPOFOL 10 MG/ML EMULSION: Performed by: NURSE ANESTHETIST, CERTIFIED REGISTERED

## 2017-10-25 PROCEDURE — 25010000003 CEFAZOLIN IN DEXTROSE 2-4 GM/100ML-% SOLUTION

## 2017-10-25 PROCEDURE — 59510 CESAREAN DELIVERY: CPT | Performed by: OBSTETRICS & GYNECOLOGY

## 2017-10-25 PROCEDURE — 25010000002 HYDROMORPHONE PER 4 MG: Performed by: ANESTHESIOLOGY

## 2017-10-25 PROCEDURE — 86900 BLOOD TYPING SEROLOGIC ABO: CPT | Performed by: OBSTETRICS & GYNECOLOGY

## 2017-10-25 PROCEDURE — 59514 CESAREAN DELIVERY ONLY: CPT | Performed by: OBSTETRICS & GYNECOLOGY

## 2017-10-25 PROCEDURE — 25010000002 FENTANYL CITRATE (PF) 100 MCG/2ML SOLUTION: Performed by: NURSE ANESTHETIST, CERTIFIED REGISTERED

## 2017-10-25 PROCEDURE — 25010000002 SUCCINYLCHOLINE PER 20 MG: Performed by: NURSE ANESTHETIST, CERTIFIED REGISTERED

## 2017-10-25 PROCEDURE — 86850 RBC ANTIBODY SCREEN: CPT | Performed by: OBSTETRICS & GYNECOLOGY

## 2017-10-25 PROCEDURE — 25010000002 PROMETHAZINE PER 50 MG: Performed by: NURSE ANESTHETIST, CERTIFIED REGISTERED

## 2017-10-25 PROCEDURE — 25010000002 ONDANSETRON PER 1 MG: Performed by: NURSE ANESTHETIST, CERTIFIED REGISTERED

## 2017-10-25 PROCEDURE — 85027 COMPLETE CBC AUTOMATED: CPT | Performed by: OBSTETRICS & GYNECOLOGY

## 2017-10-25 PROCEDURE — 86901 BLOOD TYPING SEROLOGIC RH(D): CPT | Performed by: OBSTETRICS & GYNECOLOGY

## 2017-10-25 PROCEDURE — 25010000002 MAGNESIUM SULFATE 40 GM/1000ML SOLUTION

## 2017-10-25 PROCEDURE — 0502F SUBSEQUENT PRENATAL CARE: CPT | Performed by: OBSTETRICS & GYNECOLOGY

## 2017-10-25 PROCEDURE — 81002 URINALYSIS NONAUTO W/O SCOPE: CPT | Performed by: OBSTETRICS & GYNECOLOGY

## 2017-10-25 RX ORDER — HYDRALAZINE HYDROCHLORIDE 20 MG/ML
5 INJECTION INTRAMUSCULAR; INTRAVENOUS
Status: DISCONTINUED | OUTPATIENT
Start: 2017-10-25 | End: 2017-10-25

## 2017-10-25 RX ORDER — EPHEDRINE SULFATE 50 MG/ML
5 INJECTION, SOLUTION INTRAVENOUS ONCE AS NEEDED
Status: DISCONTINUED | OUTPATIENT
Start: 2017-10-25 | End: 2017-10-25

## 2017-10-25 RX ORDER — PROPOFOL 10 MG/ML
VIAL (ML) INTRAVENOUS AS NEEDED
Status: DISCONTINUED | OUTPATIENT
Start: 2017-10-25 | End: 2017-10-25 | Stop reason: SURG

## 2017-10-25 RX ORDER — LABETALOL HYDROCHLORIDE 5 MG/ML
5 INJECTION, SOLUTION INTRAVENOUS
Status: DISCONTINUED | OUTPATIENT
Start: 2017-10-25 | End: 2017-10-25

## 2017-10-25 RX ORDER — PROMETHAZINE HYDROCHLORIDE 12.5 MG/1
12.5 TABLET ORAL ONCE AS NEEDED
Status: DISCONTINUED | OUTPATIENT
Start: 2017-10-25 | End: 2017-10-25

## 2017-10-25 RX ORDER — MAGNESIUM SULFATE HEPTAHYDRATE 40 MG/ML
INJECTION, SOLUTION INTRAVENOUS
Status: COMPLETED
Start: 2017-10-25 | End: 2017-10-25

## 2017-10-25 RX ORDER — LANOLIN 100 %
OINTMENT (GRAM) TOPICAL
Status: DISCONTINUED | OUTPATIENT
Start: 2017-10-25 | End: 2017-10-28 | Stop reason: HOSPADM

## 2017-10-25 RX ORDER — ONDANSETRON 4 MG/1
4 TABLET, ORALLY DISINTEGRATING ORAL EVERY 6 HOURS PRN
Status: DISCONTINUED | OUTPATIENT
Start: 2017-10-25 | End: 2017-10-25 | Stop reason: HOSPADM

## 2017-10-25 RX ORDER — MORPHINE SULFATE 2 MG/ML
2 INJECTION, SOLUTION INTRAMUSCULAR; INTRAVENOUS
Status: DISCONTINUED | OUTPATIENT
Start: 2017-10-25 | End: 2017-10-25

## 2017-10-25 RX ORDER — FENTANYL CITRATE 50 UG/ML
INJECTION, SOLUTION INTRAMUSCULAR; INTRAVENOUS AS NEEDED
Status: DISCONTINUED | OUTPATIENT
Start: 2017-10-25 | End: 2017-10-25 | Stop reason: SURG

## 2017-10-25 RX ORDER — OXYCODONE AND ACETAMINOPHEN 7.5; 325 MG/1; MG/1
1 TABLET ORAL ONCE AS NEEDED
Status: DISCONTINUED | OUTPATIENT
Start: 2017-10-25 | End: 2017-10-25

## 2017-10-25 RX ORDER — METHYLERGONOVINE MALEATE 0.2 MG/ML
200 INJECTION INTRAVENOUS ONCE
Status: DISCONTINUED | OUTPATIENT
Start: 2017-10-25 | End: 2017-10-28 | Stop reason: HOSPADM

## 2017-10-25 RX ORDER — SUCCINYLCHOLINE CHLORIDE 20 MG/ML
INJECTION INTRAMUSCULAR; INTRAVENOUS AS NEEDED
Status: DISCONTINUED | OUTPATIENT
Start: 2017-10-25 | End: 2017-10-25 | Stop reason: SURG

## 2017-10-25 RX ORDER — ONDANSETRON 2 MG/ML
4 INJECTION INTRAMUSCULAR; INTRAVENOUS ONCE AS NEEDED
Status: DISCONTINUED | OUTPATIENT
Start: 2017-10-25 | End: 2017-10-25

## 2017-10-25 RX ORDER — CEFAZOLIN SODIUM 2 G/100ML
2 INJECTION, SOLUTION INTRAVENOUS ONCE
Status: COMPLETED | OUTPATIENT
Start: 2017-10-25 | End: 2017-10-25

## 2017-10-25 RX ORDER — IBUPROFEN 600 MG/1
600 TABLET ORAL EVERY 6 HOURS PRN
Status: DISCONTINUED | OUTPATIENT
Start: 2017-10-25 | End: 2017-10-25 | Stop reason: HOSPADM

## 2017-10-25 RX ORDER — HYDROMORPHONE HCL IN 0.9% NACL 10 MG/50ML
PATIENT CONTROLLED ANALGESIA SYRINGE INTRAVENOUS CONTINUOUS
Status: DISCONTINUED | OUTPATIENT
Start: 2017-10-25 | End: 2017-10-26

## 2017-10-25 RX ORDER — NALOXONE HCL 0.4 MG/ML
0.2 VIAL (ML) INJECTION AS NEEDED
Status: DISCONTINUED | OUTPATIENT
Start: 2017-10-25 | End: 2017-10-25

## 2017-10-25 RX ORDER — IBUPROFEN 800 MG/1
800 TABLET ORAL EVERY 8 HOURS PRN
Status: DISCONTINUED | OUTPATIENT
Start: 2017-10-25 | End: 2017-10-28 | Stop reason: HOSPADM

## 2017-10-25 RX ORDER — OXYCODONE HYDROCHLORIDE AND ACETAMINOPHEN 5; 325 MG/1; MG/1
1 TABLET ORAL EVERY 4 HOURS PRN
Status: DISCONTINUED | OUTPATIENT
Start: 2017-10-25 | End: 2017-10-25 | Stop reason: HOSPADM

## 2017-10-25 RX ORDER — NALOXONE HCL 0.4 MG/ML
0.4 VIAL (ML) INJECTION
Status: DISCONTINUED | OUTPATIENT
Start: 2017-10-25 | End: 2017-10-25

## 2017-10-25 RX ORDER — BISACODYL 10 MG
10 SUPPOSITORY, RECTAL RECTAL DAILY PRN
Status: DISCONTINUED | OUTPATIENT
Start: 2017-10-25 | End: 2017-10-28 | Stop reason: HOSPADM

## 2017-10-25 RX ORDER — ONDANSETRON 2 MG/ML
INJECTION INTRAMUSCULAR; INTRAVENOUS AS NEEDED
Status: DISCONTINUED | OUTPATIENT
Start: 2017-10-25 | End: 2017-10-25 | Stop reason: SURG

## 2017-10-25 RX ORDER — MORPHINE SULFATE 1 MG/ML
INJECTION, SOLUTION EPIDURAL; INTRATHECAL; INTRAVENOUS AS NEEDED
Status: DISCONTINUED | OUTPATIENT
Start: 2017-10-25 | End: 2017-10-25 | Stop reason: SURG

## 2017-10-25 RX ORDER — SIMETHICONE 80 MG
80 TABLET,CHEWABLE ORAL 4 TIMES DAILY PRN
Status: DISCONTINUED | OUTPATIENT
Start: 2017-10-25 | End: 2017-10-28 | Stop reason: HOSPADM

## 2017-10-25 RX ORDER — PROMETHAZINE HYDROCHLORIDE 25 MG/ML
INJECTION, SOLUTION INTRAMUSCULAR; INTRAVENOUS AS NEEDED
Status: DISCONTINUED | OUTPATIENT
Start: 2017-10-25 | End: 2017-10-25 | Stop reason: SURG

## 2017-10-25 RX ORDER — ONDANSETRON 4 MG/1
4 TABLET, FILM COATED ORAL EVERY 8 HOURS PRN
Status: DISCONTINUED | OUTPATIENT
Start: 2017-10-25 | End: 2017-10-28 | Stop reason: HOSPADM

## 2017-10-25 RX ORDER — LIDOCAINE HYDROCHLORIDE 20 MG/ML
INJECTION, SOLUTION INFILTRATION; PERINEURAL AS NEEDED
Status: DISCONTINUED | OUTPATIENT
Start: 2017-10-25 | End: 2017-10-25 | Stop reason: SURG

## 2017-10-25 RX ORDER — CARBOPROST TROMETHAMINE 250 UG/ML
250 INJECTION, SOLUTION INTRAMUSCULAR AS NEEDED
Status: DISCONTINUED | OUTPATIENT
Start: 2017-10-25 | End: 2017-10-25 | Stop reason: HOSPADM

## 2017-10-25 RX ORDER — HYDROCODONE BITARTRATE AND ACETAMINOPHEN 7.5; 325 MG/1; MG/1
1 TABLET ORAL ONCE AS NEEDED
Status: DISCONTINUED | OUTPATIENT
Start: 2017-10-25 | End: 2017-10-25

## 2017-10-25 RX ORDER — BETAMETHASONE SODIUM PHOSPHATE AND BETAMETHASONE ACETATE 3; 3 MG/ML; MG/ML
12 INJECTION, SUSPENSION INTRA-ARTICULAR; INTRALESIONAL; INTRAMUSCULAR; SOFT TISSUE EVERY 24 HOURS
Status: DISCONTINUED | OUTPATIENT
Start: 2017-10-25 | End: 2017-10-25

## 2017-10-25 RX ORDER — CEFAZOLIN SODIUM 2 G/100ML
INJECTION, SOLUTION INTRAVENOUS
Status: COMPLETED
Start: 2017-10-25 | End: 2017-10-25

## 2017-10-25 RX ORDER — PRENATAL VIT NO.126/IRON/FOLIC 28MG-0.8MG
1 TABLET ORAL DAILY
Status: DISCONTINUED | OUTPATIENT
Start: 2017-10-25 | End: 2017-10-28 | Stop reason: HOSPADM

## 2017-10-25 RX ORDER — HYDROMORPHONE HYDROCHLORIDE 1 MG/ML
0.5 INJECTION, SOLUTION INTRAMUSCULAR; INTRAVENOUS; SUBCUTANEOUS
Status: DISCONTINUED | OUTPATIENT
Start: 2017-10-25 | End: 2017-10-25

## 2017-10-25 RX ORDER — DIPHENHYDRAMINE HCL 25 MG
25 CAPSULE ORAL EVERY 4 HOURS PRN
Status: DISCONTINUED | OUTPATIENT
Start: 2017-10-25 | End: 2017-10-28 | Stop reason: HOSPADM

## 2017-10-25 RX ORDER — ONDANSETRON 2 MG/ML
4 INJECTION INTRAMUSCULAR; INTRAVENOUS EVERY 6 HOURS PRN
Status: DISCONTINUED | OUTPATIENT
Start: 2017-10-25 | End: 2017-10-25 | Stop reason: HOSPADM

## 2017-10-25 RX ORDER — SODIUM CHLORIDE 0.9 % (FLUSH) 0.9 %
1-10 SYRINGE (ML) INJECTION AS NEEDED
Status: DISCONTINUED | OUTPATIENT
Start: 2017-10-25 | End: 2017-10-25 | Stop reason: HOSPADM

## 2017-10-25 RX ORDER — ONDANSETRON 4 MG/1
4 TABLET, FILM COATED ORAL EVERY 6 HOURS PRN
Status: DISCONTINUED | OUTPATIENT
Start: 2017-10-25 | End: 2017-10-25 | Stop reason: HOSPADM

## 2017-10-25 RX ORDER — SODIUM CHLORIDE, SODIUM LACTATE, POTASSIUM CHLORIDE, CALCIUM CHLORIDE 600; 310; 30; 20 MG/100ML; MG/100ML; MG/100ML; MG/100ML
125 INJECTION, SOLUTION INTRAVENOUS CONTINUOUS
Status: DISCONTINUED | OUTPATIENT
Start: 2017-10-25 | End: 2017-10-25

## 2017-10-25 RX ORDER — FENTANYL CITRATE 50 UG/ML
50 INJECTION, SOLUTION INTRAMUSCULAR; INTRAVENOUS
Status: DISCONTINUED | OUTPATIENT
Start: 2017-10-25 | End: 2017-10-25

## 2017-10-25 RX ORDER — OXYTOCIN/RINGER'S LACTATE 10/500ML
PLASTIC BAG, INJECTION (ML) INTRAVENOUS
Status: DISPENSED
Start: 2017-10-25 | End: 2017-10-26

## 2017-10-25 RX ORDER — MORPHINE SULFATE 10 MG/ML
INJECTION, SOLUTION INTRAMUSCULAR; INTRAVENOUS
Status: DISPENSED
Start: 2017-10-25 | End: 2017-10-26

## 2017-10-25 RX ORDER — FENTANYL CITRATE 50 UG/ML
INJECTION, SOLUTION INTRAMUSCULAR; INTRAVENOUS
Status: DISPENSED
Start: 2017-10-25 | End: 2017-10-26

## 2017-10-25 RX ORDER — OXYTOCIN/RINGER'S LACTATE 10/500ML
PLASTIC BAG, INJECTION (ML) INTRAVENOUS CONTINUOUS PRN
Status: DISCONTINUED | OUTPATIENT
Start: 2017-10-25 | End: 2017-10-25 | Stop reason: SURG

## 2017-10-25 RX ORDER — BISACODYL 5 MG/1
10 TABLET, DELAYED RELEASE ORAL DAILY PRN
Status: DISCONTINUED | OUTPATIENT
Start: 2017-10-25 | End: 2017-10-28 | Stop reason: HOSPADM

## 2017-10-25 RX ORDER — NALOXONE HCL 0.4 MG/ML
0.1 VIAL (ML) INJECTION
Status: DISCONTINUED | OUTPATIENT
Start: 2017-10-25 | End: 2017-10-28 | Stop reason: HOSPADM

## 2017-10-25 RX ORDER — METHYLERGONOVINE MALEATE 0.2 MG/ML
200 INJECTION INTRAVENOUS AS NEEDED
Status: DISCONTINUED | OUTPATIENT
Start: 2017-10-25 | End: 2017-10-25 | Stop reason: HOSPADM

## 2017-10-25 RX ORDER — DIPHENHYDRAMINE HYDROCHLORIDE 50 MG/ML
25 INJECTION INTRAMUSCULAR; INTRAVENOUS EVERY 4 HOURS PRN
Status: DISCONTINUED | OUTPATIENT
Start: 2017-10-25 | End: 2017-10-25

## 2017-10-25 RX ORDER — HYDROMORPHONE HYDROCHLORIDE 1 MG/ML
0.5 INJECTION, SOLUTION INTRAMUSCULAR; INTRAVENOUS; SUBCUTANEOUS
Status: COMPLETED | OUTPATIENT
Start: 2017-10-25 | End: 2017-10-25

## 2017-10-25 RX ORDER — PROMETHAZINE HYDROCHLORIDE 25 MG/ML
12.5 INJECTION, SOLUTION INTRAMUSCULAR; INTRAVENOUS ONCE AS NEEDED
Status: DISCONTINUED | OUTPATIENT
Start: 2017-10-25 | End: 2017-10-25

## 2017-10-25 RX ORDER — FLUMAZENIL 0.1 MG/ML
0.2 INJECTION INTRAVENOUS AS NEEDED
Status: DISCONTINUED | OUTPATIENT
Start: 2017-10-25 | End: 2017-10-25

## 2017-10-25 RX ORDER — DIPHENHYDRAMINE HYDROCHLORIDE 50 MG/ML
12.5 INJECTION INTRAMUSCULAR; INTRAVENOUS
Status: DISCONTINUED | OUTPATIENT
Start: 2017-10-25 | End: 2017-10-25

## 2017-10-25 RX ORDER — PHYTONADIONE 2 MG/ML
INJECTION, EMULSION INTRAMUSCULAR; INTRAVENOUS; SUBCUTANEOUS
Status: DISPENSED
Start: 2017-10-25 | End: 2017-10-26

## 2017-10-25 RX ORDER — METOCLOPRAMIDE HYDROCHLORIDE 5 MG/ML
10 INJECTION INTRAMUSCULAR; INTRAVENOUS EVERY 4 HOURS PRN
Status: DISCONTINUED | OUTPATIENT
Start: 2017-10-25 | End: 2017-10-25

## 2017-10-25 RX ORDER — LIDOCAINE HYDROCHLORIDE 10 MG/ML
5 INJECTION, SOLUTION INFILTRATION; PERINEURAL AS NEEDED
Status: DISCONTINUED | OUTPATIENT
Start: 2017-10-25 | End: 2017-10-25 | Stop reason: HOSPADM

## 2017-10-25 RX ORDER — DIPHENHYDRAMINE HCL 25 MG
25 CAPSULE ORAL EVERY 4 HOURS PRN
Status: DISCONTINUED | OUTPATIENT
Start: 2017-10-25 | End: 2017-10-25

## 2017-10-25 RX ORDER — DOCUSATE SODIUM 100 MG/1
100 CAPSULE, LIQUID FILLED ORAL 2 TIMES DAILY PRN
Status: DISCONTINUED | OUTPATIENT
Start: 2017-10-25 | End: 2017-10-28 | Stop reason: HOSPADM

## 2017-10-25 RX ORDER — ERYTHROMYCIN 5 MG/G
OINTMENT OPHTHALMIC
Status: DISPENSED
Start: 2017-10-25 | End: 2017-10-26

## 2017-10-25 RX ORDER — ACETAMINOPHEN 325 MG/1
650 TABLET ORAL EVERY 4 HOURS PRN
Status: DISCONTINUED | OUTPATIENT
Start: 2017-10-25 | End: 2017-10-25

## 2017-10-25 RX ORDER — OXYTOCIN 10 [USP'U]/ML
INJECTION, SOLUTION INTRAMUSCULAR; INTRAVENOUS
Status: DISPENSED
Start: 2017-10-25 | End: 2017-10-26

## 2017-10-25 RX ORDER — PROMETHAZINE HYDROCHLORIDE 25 MG/1
25 TABLET ORAL ONCE AS NEEDED
Status: DISCONTINUED | OUTPATIENT
Start: 2017-10-25 | End: 2017-10-25

## 2017-10-25 RX ORDER — PROMETHAZINE HYDROCHLORIDE 25 MG/1
25 SUPPOSITORY RECTAL ONCE AS NEEDED
Status: DISCONTINUED | OUTPATIENT
Start: 2017-10-25 | End: 2017-10-25

## 2017-10-25 RX ADMIN — HYDROMORPHONE HYDROCHLORIDE 0.5 MG: 1 INJECTION, SOLUTION INTRAMUSCULAR; INTRAVENOUS; SUBCUTANEOUS at 15:21

## 2017-10-25 RX ADMIN — CEFAZOLIN SODIUM 2 G: 2 INJECTION, SOLUTION INTRAVENOUS at 14:06

## 2017-10-25 RX ADMIN — HYDROMORPHONE HYDROCHLORIDE 0.5 MG: 1 INJECTION, SOLUTION INTRAMUSCULAR; INTRAVENOUS; SUBCUTANEOUS at 15:28

## 2017-10-25 RX ADMIN — SODIUM CHLORIDE, POTASSIUM CHLORIDE, SODIUM LACTATE AND CALCIUM CHLORIDE 1000 ML: 600; 310; 30; 20 INJECTION, SOLUTION INTRAVENOUS at 14:00

## 2017-10-25 RX ADMIN — Medication 10 UNITS: at 14:40

## 2017-10-25 RX ADMIN — SIMETHICONE CHEW TAB 80 MG 80 MG: 80 TABLET ORAL at 20:45

## 2017-10-25 RX ADMIN — Medication: at 16:47

## 2017-10-25 RX ADMIN — SODIUM CHLORIDE, POTASSIUM CHLORIDE, SODIUM LACTATE AND CALCIUM CHLORIDE 125 ML/HR: 600; 310; 30; 20 INJECTION, SOLUTION INTRAVENOUS at 14:03

## 2017-10-25 RX ADMIN — HYDROMORPHONE HYDROCHLORIDE 0.5 MG: 1 INJECTION, SOLUTION INTRAMUSCULAR; INTRAVENOUS; SUBCUTANEOUS at 15:14

## 2017-10-25 RX ADMIN — SUCCINYLCHOLINE CHLORIDE 160 MG: 20 INJECTION, SOLUTION INTRAMUSCULAR; INTRAVENOUS at 14:13

## 2017-10-25 RX ADMIN — MORPHINE SULFATE 5 MG: 1 INJECTION EPIDURAL; INTRATHECAL; INTRAVENOUS at 14:24

## 2017-10-25 RX ADMIN — FENTANYL CITRATE 50 MCG: 50 INJECTION, SOLUTION INTRAMUSCULAR; INTRAVENOUS at 14:35

## 2017-10-25 RX ADMIN — PROMETHAZINE HYDROCHLORIDE 12.5 MG: 25 INJECTION INTRAMUSCULAR; INTRAVENOUS at 14:29

## 2017-10-25 RX ADMIN — LIDOCAINE HYDROCHLORIDE 40 MG: 20 INJECTION, SOLUTION INFILTRATION; PERINEURAL at 14:12

## 2017-10-25 RX ADMIN — OXYCODONE HYDROCHLORIDE AND ACETAMINOPHEN 1 TABLET: 5; 325 TABLET ORAL at 16:31

## 2017-10-25 RX ADMIN — ONDANSETRON 4 MG: 2 INJECTION INTRAMUSCULAR; INTRAVENOUS at 14:21

## 2017-10-25 RX ADMIN — BETAMETHASONE SODIUM PHOSPHATE AND BETAMETHASONE ACETATE 12 MG: 3; 3 INJECTION, SUSPENSION INTRA-ARTICULAR; INTRALESIONAL; INTRAMUSCULAR at 14:07

## 2017-10-25 RX ADMIN — MAGNESIUM SULFATE IN WATER 6 G: 40 INJECTION, SOLUTION INTRAVENOUS at 14:06

## 2017-10-25 RX ADMIN — MORPHINE SULFATE 2 MG: 2 INJECTION, SOLUTION INTRAMUSCULAR; INTRAVENOUS at 15:54

## 2017-10-25 RX ADMIN — Medication 10 UNITS: at 14:19

## 2017-10-25 RX ADMIN — HYDROMORPHONE HYDROCHLORIDE 0.5 MG: 1 INJECTION, SOLUTION INTRAMUSCULAR; INTRAVENOUS; SUBCUTANEOUS at 15:37

## 2017-10-25 RX ADMIN — FENTANYL CITRATE 100 MCG: 50 INJECTION, SOLUTION INTRAMUSCULAR; INTRAVENOUS at 14:17

## 2017-10-25 RX ADMIN — FENTANYL CITRATE 100 MCG: 50 INJECTION, SOLUTION INTRAMUSCULAR; INTRAVENOUS at 14:52

## 2017-10-25 RX ADMIN — PROPOFOL 200 MG: 10 INJECTION, EMULSION INTRAVENOUS at 14:12

## 2017-10-25 RX ADMIN — IBUPROFEN 800 MG: 800 TABLET ORAL at 20:45

## 2017-10-25 NOTE — ANESTHESIA PROCEDURE NOTES
Airway  Urgency: elective    Airway not difficult    General Information and Staff    Patient location during procedure: OR  Anesthesiologist: SERGEY EAST  CRNA: BRYN KOENIG    Indications and Patient Condition  Indications for airway management: airway protection    Preoxygenated: yes  Mask difficulty assessment: 0 - not attempted (rsi with cricoid)    Final Airway Details  Final airway type: endotracheal airway      Successful airway: ETT  Cuffed: yes   Successful intubation technique: direct laryngoscopy  Endotracheal tube insertion site: oral  Blade: Prashanth  Blade size: #3  ETT size: 7.0 mm  Cormack-Lehane Classification: grade I - full view of glottis  Placement verified by: chest auscultation and capnometry   Measured from: lips  ETT to lips (cm): 20  Number of attempts at approach: 1    Additional Comments  Smooth IV induction/intubation. Orally intubated, easy, atraumatic, lips/teeth/mouth left intact, as preop. Direct visual of vocal cords. +ETCO2, bilateral breath sounds and equal.

## 2017-10-25 NOTE — PROGRESS NOTES
Pt states having back and lower abdominal pain sometimes that feels like Hansford Oliveira. Pt denies any vaginal bleeding and leaking.VM CMA  Doppler placed and heart tones noted to be in the 80s' - US confirmed at 75  Patient walked immediately to L&D

## 2017-10-25 NOTE — ANESTHESIA POSTPROCEDURE EVALUATION
"Patient: Daphne Robles    Procedure Summary     Date Anesthesia Start Anesthesia Stop Room / Location    10/25/17 9915 [id number]  [facility]       Procedure Diagnosis Surgeon Provider     SECTION REPEAT (N/A Abdomen) History of  delivery; Supervision of high risk pregnancy in third trimester; History of placenta abruption; Mitral regurgitation of mother during pregnancy  (History of  delivery [Z98.891]; Supervision of high risk pregnancy in third trimester [O09.93]; History of placenta abruption [Z87.59]; Mitral regurgitation of mother during pregnancy [O99.419, I34.0]) MD Dony Huitron MD          Anesthesia Type: general  Last vitals  BP   134/74 (10/25/17 1659)   Temp   34.6 °C (94.3 °F) (10/25/17 1500)   Pulse   82 (10/25/17 1700)   Resp   16 (10/25/17 1659)     SpO2   98 % (10/25/17 1700)     Post Anesthesia Care and Evaluation    Patient location during evaluation: PACU  Patient participation: complete - patient participated  Level of consciousness: awake and alert  Pain score: 0  Pain management: adequate  Airway patency: patent  Anesthetic complications: No anesthetic complications    Cardiovascular status: acceptable  Respiratory status: acceptable  Hydration status: acceptable    Comments: /74  Pulse 82  Temp 34.6 °C (94.3 °F) (Axillary)   Resp 16  Ht 62\" (157.5 cm)  Wt 131 lb (59.4 kg)  LMP 2017  SpO2 98%  Breastfeeding? Unknown  BMI 23.96 kg/m2      "

## 2017-10-26 LAB
BASOPHILS # BLD AUTO: 0.02 10*3/MM3 (ref 0–0.2)
BASOPHILS NFR BLD AUTO: 0.1 % (ref 0–1.5)
DEPRECATED RDW RBC AUTO: 41.9 FL (ref 37–54)
EOSINOPHIL # BLD AUTO: 0.01 10*3/MM3 (ref 0–0.7)
EOSINOPHIL NFR BLD AUTO: 0.1 % (ref 0.3–6.2)
ERYTHROCYTE [DISTWIDTH] IN BLOOD BY AUTOMATED COUNT: 13 % (ref 11.7–13)
HCT VFR BLD AUTO: 27.2 % (ref 35.6–45.5)
HGB BLD-MCNC: 9.1 G/DL (ref 11.9–15.5)
IMM GRANULOCYTES # BLD: 0.08 10*3/MM3 (ref 0–0.03)
IMM GRANULOCYTES NFR BLD: 0.4 % (ref 0–0.5)
LYMPHOCYTES # BLD AUTO: 1.43 10*3/MM3 (ref 0.9–4.8)
LYMPHOCYTES NFR BLD AUTO: 7.7 % (ref 19.6–45.3)
MCH RBC QN AUTO: 30 PG (ref 26.9–32)
MCHC RBC AUTO-ENTMCNC: 33.5 G/DL (ref 32.4–36.3)
MCV RBC AUTO: 89.8 FL (ref 80.5–98.2)
MONOCYTES # BLD AUTO: 1.09 10*3/MM3 (ref 0.2–1.2)
MONOCYTES NFR BLD AUTO: 5.9 % (ref 5–12)
NEUTROPHILS # BLD AUTO: 15.95 10*3/MM3 (ref 1.9–8.1)
NEUTROPHILS NFR BLD AUTO: 85.8 % (ref 42.7–76)
PLATELET # BLD AUTO: 246 10*3/MM3 (ref 140–500)
PMV BLD AUTO: 11.3 FL (ref 6–12)
RBC # BLD AUTO: 3.03 10*6/MM3 (ref 3.9–5.2)
WBC NRBC COR # BLD: 18.58 10*3/MM3 (ref 4.5–10.7)

## 2017-10-26 PROCEDURE — 99024 POSTOP FOLLOW-UP VISIT: CPT | Performed by: OBSTETRICS & GYNECOLOGY

## 2017-10-26 PROCEDURE — 85025 COMPLETE CBC W/AUTO DIFF WBC: CPT | Performed by: OBSTETRICS & GYNECOLOGY

## 2017-10-26 RX ORDER — OXYCODONE HYDROCHLORIDE AND ACETAMINOPHEN 5; 325 MG/1; MG/1
2 TABLET ORAL EVERY 4 HOURS PRN
Status: DISCONTINUED | OUTPATIENT
Start: 2017-10-26 | End: 2017-10-28 | Stop reason: HOSPADM

## 2017-10-26 RX ORDER — OXYCODONE AND ACETAMINOPHEN 7.5; 325 MG/1; MG/1
2 TABLET ORAL EVERY 4 HOURS PRN
Status: DISCONTINUED | OUTPATIENT
Start: 2017-10-26 | End: 2017-10-28 | Stop reason: HOSPADM

## 2017-10-26 RX ADMIN — Medication 1 TABLET: at 07:59

## 2017-10-26 RX ADMIN — SIMETHICONE CHEW TAB 80 MG 80 MG: 80 TABLET ORAL at 20:37

## 2017-10-26 RX ADMIN — BISACODYL 10 MG: 5 TABLET, COATED ORAL at 20:37

## 2017-10-26 RX ADMIN — SIMETHICONE CHEW TAB 80 MG 80 MG: 80 TABLET ORAL at 03:24

## 2017-10-26 RX ADMIN — DOCUSATE SODIUM 100 MG: 100 CAPSULE, LIQUID FILLED ORAL at 20:37

## 2017-10-26 RX ADMIN — SIMETHICONE CHEW TAB 80 MG 80 MG: 80 TABLET ORAL at 09:09

## 2017-10-26 RX ADMIN — OXYCODONE HYDROCHLORIDE AND ACETAMINOPHEN 2 TABLET: 7.5; 325 TABLET ORAL at 20:37

## 2017-10-26 RX ADMIN — OXYCODONE HYDROCHLORIDE AND ACETAMINOPHEN 2 TABLET: 5; 325 TABLET ORAL at 10:00

## 2017-10-26 RX ADMIN — OXYCODONE HYDROCHLORIDE AND ACETAMINOPHEN 2 TABLET: 5; 325 TABLET ORAL at 15:11

## 2017-10-26 RX ADMIN — IBUPROFEN 800 MG: 800 TABLET ORAL at 04:51

## 2017-10-26 RX ADMIN — IBUPROFEN 800 MG: 800 TABLET ORAL at 15:12

## 2017-10-27 RX ORDER — OXYTOCIN/RINGER'S LACTATE 10/500ML
PLASTIC BAG, INJECTION (ML) INTRAVENOUS AS NEEDED
Status: DISCONTINUED | OUTPATIENT
Start: 2017-10-25 | End: 2017-10-27 | Stop reason: SURG

## 2017-10-27 RX ADMIN — SIMETHICONE CHEW TAB 80 MG 80 MG: 80 TABLET ORAL at 07:00

## 2017-10-27 RX ADMIN — Medication 1 TABLET: at 07:54

## 2017-10-27 RX ADMIN — SIMETHICONE CHEW TAB 80 MG 80 MG: 80 TABLET ORAL at 20:39

## 2017-10-27 RX ADMIN — OXYCODONE HYDROCHLORIDE AND ACETAMINOPHEN 2 TABLET: 7.5; 325 TABLET ORAL at 07:00

## 2017-10-27 RX ADMIN — OXYCODONE HYDROCHLORIDE AND ACETAMINOPHEN 2 TABLET: 5; 325 TABLET ORAL at 20:26

## 2017-10-27 RX ADMIN — SIMETHICONE CHEW TAB 80 MG 80 MG: 80 TABLET ORAL at 11:20

## 2017-10-27 RX ADMIN — OXYCODONE HYDROCHLORIDE AND ACETAMINOPHEN 2 TABLET: 5; 325 TABLET ORAL at 11:20

## 2017-10-27 RX ADMIN — DOCUSATE SODIUM 100 MG: 100 CAPSULE, LIQUID FILLED ORAL at 20:38

## 2017-10-27 RX ADMIN — IBUPROFEN 800 MG: 800 TABLET ORAL at 07:00

## 2017-10-27 RX ADMIN — IBUPROFEN 800 MG: 800 TABLET ORAL at 14:55

## 2017-10-27 RX ADMIN — OXYCODONE HYDROCHLORIDE AND ACETAMINOPHEN 2 TABLET: 5; 325 TABLET ORAL at 15:57

## 2017-10-27 RX ADMIN — SIMETHICONE CHEW TAB 80 MG 80 MG: 80 TABLET ORAL at 14:55

## 2017-10-27 RX ADMIN — DOCUSATE SODIUM 100 MG: 100 CAPSULE, LIQUID FILLED ORAL at 07:54

## 2017-10-28 VITALS
OXYGEN SATURATION: 97 % | WEIGHT: 131 LBS | TEMPERATURE: 98 F | HEIGHT: 62 IN | HEART RATE: 79 BPM | BODY MASS INDEX: 24.11 KG/M2 | DIASTOLIC BLOOD PRESSURE: 68 MMHG | SYSTOLIC BLOOD PRESSURE: 102 MMHG | RESPIRATION RATE: 18 BRPM

## 2017-10-28 PROCEDURE — 99024 POSTOP FOLLOW-UP VISIT: CPT | Performed by: OBSTETRICS & GYNECOLOGY

## 2017-10-28 RX ORDER — OXYCODONE HYDROCHLORIDE AND ACETAMINOPHEN 5; 325 MG/1; MG/1
2 TABLET ORAL EVERY 4 HOURS PRN
Qty: 30 TABLET | Refills: 0 | Status: SHIPPED | OUTPATIENT
Start: 2017-10-28 | End: 2017-11-05

## 2017-10-28 RX ORDER — IBUPROFEN 800 MG/1
800 TABLET ORAL EVERY 8 HOURS PRN
Qty: 30 TABLET | Refills: 0 | Status: SHIPPED | OUTPATIENT
Start: 2017-10-28 | End: 2017-11-08

## 2017-10-28 RX ADMIN — Medication 1 TABLET: at 10:09

## 2017-10-28 RX ADMIN — OXYCODONE HYDROCHLORIDE AND ACETAMINOPHEN 2 TABLET: 5; 325 TABLET ORAL at 11:37

## 2017-10-28 RX ADMIN — IBUPROFEN 800 MG: 800 TABLET ORAL at 10:09

## 2017-10-28 RX ADMIN — IBUPROFEN 800 MG: 800 TABLET ORAL at 01:00

## 2017-10-28 RX ADMIN — OXYCODONE HYDROCHLORIDE AND ACETAMINOPHEN 2 TABLET: 5; 325 TABLET ORAL at 01:00

## 2017-10-28 RX ADMIN — OXYCODONE HYDROCHLORIDE AND ACETAMINOPHEN 2 TABLET: 5; 325 TABLET ORAL at 05:02

## 2017-10-28 RX ADMIN — DOCUSATE SODIUM 100 MG: 100 CAPSULE, LIQUID FILLED ORAL at 10:09

## 2017-11-08 ENCOUNTER — POSTPARTUM VISIT (OUTPATIENT)
Dept: OBSTETRICS AND GYNECOLOGY | Facility: CLINIC | Age: 27
End: 2017-11-08

## 2017-11-08 VITALS
BODY MASS INDEX: 21.53 KG/M2 | WEIGHT: 117 LBS | DIASTOLIC BLOOD PRESSURE: 64 MMHG | HEIGHT: 62 IN | SYSTOLIC BLOOD PRESSURE: 104 MMHG

## 2017-11-08 DIAGNOSIS — Z13.89 SCREENING FOR BLOOD OR PROTEIN IN URINE: ICD-10-CM

## 2017-11-08 PROBLEM — O99.419: Status: RESOLVED | Noted: 2017-07-14 | Resolved: 2017-11-08

## 2017-11-08 PROBLEM — Z98.891 HISTORY OF CESAREAN DELIVERY: Status: RESOLVED | Noted: 2017-05-04 | Resolved: 2017-11-08

## 2017-11-08 PROBLEM — O99.519 ASTHMA AFFECTING PREGNANCY, ANTEPARTUM: Status: RESOLVED | Noted: 2017-07-14 | Resolved: 2017-11-08

## 2017-11-08 PROBLEM — M54.9 BACK PAIN AFFECTING PREGNANCY: Status: RESOLVED | Noted: 2017-08-03 | Resolved: 2017-11-08

## 2017-11-08 PROBLEM — O41.8X90 SUBCHORIONIC BLEED: Status: RESOLVED | Noted: 2017-05-04 | Resolved: 2017-11-08

## 2017-11-08 PROBLEM — O09.93 SUPERVISION OF HIGH RISK PREGNANCY IN THIRD TRIMESTER: Status: RESOLVED | Noted: 2017-10-04 | Resolved: 2017-11-08

## 2017-11-08 PROBLEM — O34.219 PREVIOUS CESAREAN DELIVERY AFFECTING PREGNANCY: Status: RESOLVED | Noted: 2017-10-25 | Resolved: 2017-11-08

## 2017-11-08 PROBLEM — O36.8390 ANTEPARTUM FETAL BRADYCARDIA AFFECTING CARE OF MOTHER: Status: RESOLVED | Noted: 2017-10-25 | Resolved: 2017-11-08

## 2017-11-08 PROBLEM — O99.019 ANEMIA AFFECTING PREGNANCY: Status: RESOLVED | Noted: 2017-10-05 | Resolved: 2017-11-08

## 2017-11-08 PROBLEM — O46.8X9 SUBCHORIONIC BLEED: Status: RESOLVED | Noted: 2017-05-04 | Resolved: 2017-11-08

## 2017-11-08 PROBLEM — J45.909 ASTHMA AFFECTING PREGNANCY, ANTEPARTUM: Status: RESOLVED | Noted: 2017-07-14 | Resolved: 2017-11-08

## 2017-11-08 PROBLEM — I34.0: Status: RESOLVED | Noted: 2017-07-14 | Resolved: 2017-11-08

## 2017-11-08 PROBLEM — O99.891 BACK PAIN AFFECTING PREGNANCY: Status: RESOLVED | Noted: 2017-08-03 | Resolved: 2017-11-08

## 2017-11-08 PROBLEM — Z87.59 HISTORY OF PLACENTA ABRUPTION: Status: RESOLVED | Noted: 2017-05-04 | Resolved: 2017-11-08

## 2017-11-08 PROBLEM — O09.90 HIGH-RISK PREGNANCY SUPERVISION: Status: RESOLVED | Noted: 2017-05-04 | Resolved: 2017-11-08

## 2017-11-08 LAB
BILIRUB BLD-MCNC: NEGATIVE MG/DL
CLARITY, POC: CLEAR
COLOR UR: YELLOW
GLUCOSE UR STRIP-MCNC: NEGATIVE MG/DL
KETONES UR QL: NEGATIVE
LEUKOCYTE EST, POC: NEGATIVE
NITRITE UR-MCNC: NEGATIVE MG/ML
PH UR: 6.5 [PH] (ref 5–8)
PROT UR STRIP-MCNC: NEGATIVE MG/DL
RBC # UR STRIP: ABNORMAL /UL
SP GR UR: 1.03 (ref 1–1.03)
UROBILINOGEN UR QL: NORMAL

## 2017-11-08 PROCEDURE — 0503F POSTPARTUM CARE VISIT: CPT | Performed by: OBSTETRICS & GYNECOLOGY

## 2017-11-08 NOTE — PROGRESS NOTES
"Subjective   Daphne Robles is a 27 y.o. female who presents for a postpartum visit. She is 2 weeks postpartum following a low cervical transverse  section. I have fully reviewed the prenatal and intrapartum course. The delivery was at 31-4 gestational weeks. Outcome: repeat  section. Anesthesia: spinal. Postpartum course has been uncomplicated . Baby's course has been c/b prematurity . Baby is feeding by bottle. Bleeding no bleeding. Bowel function is normal. Bladder function is normal. Patient is sexually active. Contraception method is IUD. Postpartum depression screening: negative.    The following portions of the patient's history were reviewed and updated as appropriate: allergies, current medications, past family history, past medical history, past social history, past surgical history and problem list.    Review of Systems  Pertinent items are noted in HPI.    Objective   /64  Ht 62\" (157.5 cm)  Wt 117 lb (53.1 kg)  LMP 2017  Breastfeeding? No  BMI 21.4 kg/m2   General:  alert, appears stated age and cooperative   Abdomen: soft, non-tender; bowel sounds normal; no masses,  no organomegaly and INC C/D/I      Assessment/Plan   Postpartum exam.   1. Contraception: IUD - Kyleena  2. Follow up in: 5 weeks or as needed.           "

## 2017-12-13 ENCOUNTER — PROCEDURE VISIT (OUTPATIENT)
Dept: OBSTETRICS AND GYNECOLOGY | Facility: CLINIC | Age: 27
End: 2017-12-13

## 2017-12-13 VITALS
DIASTOLIC BLOOD PRESSURE: 72 MMHG | HEIGHT: 62 IN | WEIGHT: 120 LBS | BODY MASS INDEX: 22.08 KG/M2 | SYSTOLIC BLOOD PRESSURE: 102 MMHG

## 2017-12-13 DIAGNOSIS — Z30.430 ENCOUNTER FOR INSERTION OF INTRAUTERINE CONTRACEPTIVE DEVICE (IUD): Primary | ICD-10-CM

## 2017-12-13 DIAGNOSIS — Z13.9 SPECIAL SCREENING: ICD-10-CM

## 2017-12-13 DIAGNOSIS — Z13.89 SCREENING FOR BLOOD OR PROTEIN IN URINE: ICD-10-CM

## 2017-12-13 LAB

## 2017-12-13 PROCEDURE — 58300 INSERT INTRAUTERINE DEVICE: CPT | Performed by: OBSTETRICS & GYNECOLOGY

## 2017-12-13 PROCEDURE — 81002 URINALYSIS NONAUTO W/O SCOPE: CPT | Performed by: OBSTETRICS & GYNECOLOGY

## 2017-12-13 PROCEDURE — 81025 URINE PREGNANCY TEST: CPT | Performed by: OBSTETRICS & GYNECOLOGY

## 2017-12-13 NOTE — PROGRESS NOTES
Procedure   Procedures    IUD Insertion Procedure Note    Pre-operative Diagnosis: contraception  Post-operative Diagnosis: same    Indications: contraception    Procedure Details   Urine pregnancy test was done and result was neg.  The risks (including infection, bleeding, pain, and uterine perforation) and benefits of the procedure were explained to the patient and Written informed consent was obtained.      Cervix cleansed with Betadine. Uterus sounded to 7 cm. IUD inserted without difficulty. String visible and trimmed. Patient tolerated procedure well.    IUD Information:  Lot # [id number], Expiration date 08/18, Kyamritena.    Condition:  Stable    Complications:  None    Plan:    The patient was advised to call for any fever or for prolonged or severe pain or bleeding. She was advised to use NSAID as needed for mild to moderate pain.     Attending Physician Documentation:  I was present for or participated in the entire procedure, including opening and closing.

## 2020-10-26 ENCOUNTER — OFFICE VISIT (OUTPATIENT)
Dept: OBSTETRICS AND GYNECOLOGY | Age: 30
End: 2020-10-26

## 2020-10-26 VITALS
WEIGHT: 126 LBS | DIASTOLIC BLOOD PRESSURE: 62 MMHG | BODY MASS INDEX: 23.19 KG/M2 | SYSTOLIC BLOOD PRESSURE: 100 MMHG | HEIGHT: 62 IN

## 2020-10-26 DIAGNOSIS — Z31.9 PATIENT DESIRES PREGNANCY: ICD-10-CM

## 2020-10-26 DIAGNOSIS — N94.89 ADNEXAL MASS: ICD-10-CM

## 2020-10-26 DIAGNOSIS — Z01.419 WELL WOMAN EXAM: Primary | ICD-10-CM

## 2020-10-26 DIAGNOSIS — Z30.432 ENCOUNTER FOR IUD REMOVAL: ICD-10-CM

## 2020-10-26 DIAGNOSIS — Z12.4 SCREENING FOR CERVICAL CANCER: ICD-10-CM

## 2020-10-26 PROCEDURE — 58301 REMOVE INTRAUTERINE DEVICE: CPT | Performed by: NURSE PRACTITIONER

## 2020-10-26 PROCEDURE — 99395 PREV VISIT EST AGE 18-39: CPT | Performed by: NURSE PRACTITIONER

## 2020-10-26 NOTE — PROGRESS NOTES
Subjective     Chief Complaint   Patient presents with   • Gynecologic Exam     Annual exam       History of Present Illness    Daphne Roberts is a 30 y.o. No obstetric history on file. who presents for annual exam.    This patient presents today as new GYN patient  Has kyleena since 2017 after her daughter was born who is now 3 and also has a 5 year old boy   Has slight Mitral valve regurg   Last pap 2016 and was normal   Declines STD testing  Pt states she notices a mass on right lower quadrant that is firm and gets larger when she on her menses. It is tender to palpation. This has been there for a couple of years.   She is planning to get pregnant   Will start PNV      Her menses are regular every 28-30 days, lasting 4-7 days, dysmenorrhea none   Obstetric History:  OB History    No obstetric history on file.        Menstrual History:     Patient's last menstrual period was 10/04/2020 (exact date).         Current contraception: IUD  History of abnormal Pap smear: no  Received Gardasil immunization: no  Perform regular self breast exam: No  Family history of uterine or ovarian cancer: no  Family History of colon cancer: no  Family history of breast cancer: no    Mammogram: not indicated.  Colonoscopy: not indicated.  DEXA: not indicated.    Exercise: moderately active  Calcium/Vitamin D: adequate intake    The following portions of the patient's history were reviewed and updated as appropriate: allergies, current medications, past family history, past medical history, past social history, past surgical history and problem list.    Review of Systems   Constitutional: Negative.    Respiratory: Negative.    Cardiovascular: Negative.    Gastrointestinal: Positive for abdominal pain.   Genitourinary: Negative.    Skin: Negative.    Psychiatric/Behavioral: Negative.            Objective   Physical Exam  Constitutional:       General: She is awake.      Appearance: Normal appearance. She is well-developed.    HENT:      Head: Normocephalic and atraumatic.      Nose: Nose normal.   Neck:      Musculoskeletal: Normal range of motion and neck supple.      Thyroid: No thyroid mass, thyromegaly or thyroid tenderness.   Cardiovascular:      Rate and Rhythm: Normal rate and regular rhythm.      Pulses: Normal pulses.      Heart sounds: Normal heart sounds.   Pulmonary:      Effort: Pulmonary effort is normal.      Breath sounds: Normal breath sounds.   Chest:      Breasts: Breasts are symmetrical.         Right: Normal. No swelling, bleeding, inverted nipple, mass, nipple discharge, skin change or tenderness.         Left: Normal. No swelling, bleeding, inverted nipple, mass, nipple discharge, skin change or tenderness.   Abdominal:      General: Abdomen is flat. Bowel sounds are normal.      Palpations: Abdomen is soft.      Tenderness: There is no abdominal tenderness.   Genitourinary:     General: Normal vulva.      Labia:         Right: No rash, tenderness, lesion or injury.         Left: No rash, tenderness, lesion or injury.       Vagina: Normal. No signs of injury. No vaginal discharge, erythema, tenderness, bleeding, lesions or prolapsed vaginal walls.      Cervix: No discharge, friability, lesion, erythema or cervical bleeding.      Uterus: Normal. Not enlarged, not tender and no uterine prolapse.       Adnexa: Left adnexa normal.        Right: Mass present. No tenderness or fullness.          Left: No mass, tenderness or fullness.        Rectum: Normal. No mass.      Comments: Approx 3 cm adnexal mass palpated on exam. Seems more superficial than ovarian in nature.  Lymphadenopathy:      Upper Body:      Right upper body: No supraclavicular adenopathy.      Left upper body: No supraclavicular adenopathy.   Skin:     General: Skin is warm and dry.   Neurological:      General: No focal deficit present.      Mental Status: She is alert and oriented to person, place, and time.   Psychiatric:         Mood and Affect:  "Mood normal.         Behavior: Behavior normal. Behavior is cooperative.         Thought Content: Thought content normal.         Judgment: Judgment normal.         /62   Ht 157.5 cm (62\")   Wt 57.2 kg (126 lb)   LMP 10/04/2020 (Exact Date)   Breastfeeding No   BMI 23.05 kg/m²     Assessment/Plan   Diagnoses and all orders for this visit:    1. Well woman exam (Primary)  -     IGP, Rfx Aptima HPV ASCU    2. Adnexal mass    3. Screening for cervical cancer  -     IGP, Rfx Aptima HPV ASCU    4. Patient desires pregnancy    5. Encounter for IUD removal        All questions answered.  Breast self exam technique reviewed and patient encouraged to perform self-exam monthly.  Discussed healthy lifestyle modifications.  Recommended 30 minutes of aerobic exercise five times per week.  Discussed calcium needs to prevent osteoporosis.      -Pap done  -IUD removed  -Start PNV   -Declines STD testing  -Pt will return for pelvic u/s and if unable to determine mass via pelvic u/s will schedule CT or soft tissue ultrasound                     "

## 2020-10-29 LAB
CONV .: NORMAL
CYTOLOGIST CVX/VAG CYTO: NORMAL
CYTOLOGY CVX/VAG DOC CYTO: NORMAL
CYTOLOGY CVX/VAG DOC THIN PREP: NORMAL
DX ICD CODE: NORMAL
HIV 1 & 2 AB SER-IMP: NORMAL
OTHER STN SPEC: NORMAL
STAT OF ADQ CVX/VAG CYTO-IMP: NORMAL

## 2020-10-29 RX ORDER — FLUCONAZOLE 150 MG/1
150 TABLET ORAL ONCE
Qty: 1 TABLET | Refills: 0 | Status: SHIPPED | OUTPATIENT
Start: 2020-10-29 | End: 2020-10-29

## 2020-11-10 ENCOUNTER — OFFICE VISIT (OUTPATIENT)
Dept: OBSTETRICS AND GYNECOLOGY | Age: 30
End: 2020-11-10

## 2020-11-10 ENCOUNTER — PROCEDURE VISIT (OUTPATIENT)
Dept: OBSTETRICS AND GYNECOLOGY | Age: 30
End: 2020-11-10

## 2020-11-10 VITALS
WEIGHT: 127 LBS | DIASTOLIC BLOOD PRESSURE: 70 MMHG | BODY MASS INDEX: 23.37 KG/M2 | SYSTOLIC BLOOD PRESSURE: 115 MMHG | HEIGHT: 62 IN

## 2020-11-10 DIAGNOSIS — N94.89 ADNEXAL MASS: Primary | ICD-10-CM

## 2020-11-10 DIAGNOSIS — R22.2 SUBCUTANEOUS MASS OF ABDOMINAL WALL: ICD-10-CM

## 2020-11-10 PROCEDURE — 99213 OFFICE O/P EST LOW 20 MIN: CPT | Performed by: NURSE PRACTITIONER

## 2020-11-10 PROCEDURE — 76856 US EXAM PELVIC COMPLETE: CPT | Performed by: OBSTETRICS & GYNECOLOGY

## 2020-11-10 NOTE — PROGRESS NOTES
"Subjective     Chief Complaint   Patient presents with   • Gynecologic Exam     f/u R adnexal mass       Daphne Roberts is a 30 y.o.  whose LMP is No LMP recorded.     Pt presents today for f/u with pelvic u/s for right adnexal mass  She states this becomes more painful and larger during menses  When she is not having her period it is still slightly tender but smaller in size and not as painful  She has had this for a couple of years  Had C/S x 2  The mass appears subcutaneous near right side of incision     No Additional Complaints Reported    The following portions of the patient's history were reviewed and updated as appropriate:vital signs, allergies, current medications, past medical history, past social history, past surgical history and problem list      Review of Systems   A comprehensive review of systems was negative except for: Genitourinary: positive for R adnexal mass     Objective      /70   Ht 157.5 cm (62\")   Wt 57.6 kg (127 lb)   BMI 23.23 kg/m²     Physical Exam    General:   alert, comfortable and no distress   Heart: Not performed today   Lungs: Not performed today.   Breast: Not performed today   Neck: na   Abdomen: Approx 2x2 cm firm mass noted subcutaneously on right inguinal/adnexal area adjacent to c/s scar. Tender with palpation.   CVA: Not performed today   Pelvis: Not performed today   Extremities: Not performed today   Neurologic: negative   Psychiatric: Normal affect, judgement, and mood       Lab Review   Labs: No data reviewed     Imaging   Ultrasound - Pelvic Vaginal - Subcutaneous nodule measuring 1.6 x 1.3 x 0.7 cm right inguinal area seen only transabdominally     Assessment/Plan     ASSESSMENT  1. Adnexal mass    2. Subcutaneous mass of abdominal wall        PLAN  1.   Orders Placed This Encounter   Procedures   • MRI pelvis wo contrast       2. Medications prescribed this encounter:      No orders of the defined types were placed in this " encounter.    -Will order MRI of pelvis for further eval of subcutaneous mass      Sherry Zepeda, APRN  11/10/2020

## 2020-12-22 ENCOUNTER — TELEPHONE (OUTPATIENT)
Dept: OBSTETRICS AND GYNECOLOGY | Age: 30
End: 2020-12-22

## 2020-12-22 NOTE — TELEPHONE ENCOUNTER
(Le Pt)     + HPT (LMP 11/21/2020)     Pt called to schedule pregnancy confirmation, pt scheduled 1/25/2021. Pt has an MRI scheduled on 1/5/2021 and is wanting to know if it is safe for her to have.     Please advise     [phone]

## 2021-01-05 ENCOUNTER — TELEPHONE (OUTPATIENT)
Dept: OBSTETRICS AND GYNECOLOGY | Age: 31
End: 2021-01-05

## 2021-01-05 ENCOUNTER — HOSPITAL ENCOUNTER (OUTPATIENT)
Dept: MRI IMAGING | Facility: HOSPITAL | Age: 31
End: 2021-01-05

## 2021-01-05 RX ORDER — DOXYLAMINE SUCCINATE AND PYRIDOXINE HYDROCHLORIDE, DELAYED RELEASE TABLETS 10 MG/10 MG 10; 10 MG/1; MG/1
TABLET, DELAYED RELEASE ORAL
Qty: 100 TABLET | Refills: 2 | Status: SHIPPED | OUTPATIENT
Start: 2021-01-05 | End: 2021-06-07

## 2021-01-05 NOTE — TELEPHONE ENCOUNTER
I sent in diclegis for pt. Hope this helps. If she continues to be unable to eat/drink, may need ER for IV hydration and would possibly require home health as well.

## 2021-01-05 NOTE — TELEPHONE ENCOUNTER
Pt called has an upcoming pregnancy confirmation appt with  on 1/25/20 and would like to have something prescribed to her for  nausea and vomiting, she's unable to keep any food or fluids down, not even water. Please advise

## 2021-01-25 ENCOUNTER — OFFICE VISIT (OUTPATIENT)
Dept: OBSTETRICS AND GYNECOLOGY | Age: 31
End: 2021-01-25

## 2021-01-25 VITALS
DIASTOLIC BLOOD PRESSURE: 68 MMHG | SYSTOLIC BLOOD PRESSURE: 124 MMHG | BODY MASS INDEX: 22.63 KG/M2 | HEIGHT: 62 IN | WEIGHT: 123 LBS

## 2021-01-25 DIAGNOSIS — Z32.00 VISIT FOR CONFIRMATION OF PREGNANCY TEST RESULT WITH PHYSICAL EXAM: Primary | ICD-10-CM

## 2021-01-25 DIAGNOSIS — Z13.89 SCREENING FOR BLOOD OR PROTEIN IN URINE: ICD-10-CM

## 2021-01-25 PROBLEM — Z98.891 HISTORY OF 2 CESAREAN SECTIONS: Status: ACTIVE | Noted: 2021-01-25

## 2021-01-25 PROBLEM — Z87.59 HISTORY OF PLACENTAL ABRUPTION: Status: ACTIVE | Noted: 2021-01-25

## 2021-01-25 LAB
BILIRUB BLD-MCNC: NEGATIVE MG/DL
CLARITY, POC: CLEAR
COLOR UR: YELLOW
GLUCOSE UR STRIP-MCNC: NEGATIVE MG/DL
KETONES UR QL: NEGATIVE
LEUKOCYTE EST, POC: NEGATIVE
NITRITE UR-MCNC: NEGATIVE MG/ML
PH UR: 6 [PH] (ref 5–8)
PROT UR STRIP-MCNC: NEGATIVE MG/DL
RBC # UR STRIP: NEGATIVE /UL
SP GR UR: 1.01 (ref 1–1.03)
UROBILINOGEN UR QL: NORMAL

## 2021-01-25 PROCEDURE — 99214 OFFICE O/P EST MOD 30 MIN: CPT | Performed by: OBSTETRICS & GYNECOLOGY

## 2021-01-25 PROCEDURE — 81002 URINALYSIS NONAUTO W/O SCOPE: CPT | Performed by: OBSTETRICS & GYNECOLOGY

## 2021-01-25 RX ORDER — FLUCONAZOLE 150 MG/1
TABLET ORAL
COMMUNITY
Start: 2020-10-29 | End: 2021-01-25

## 2021-01-25 NOTE — PROGRESS NOTES
"Subjective   Daphne Roberts is a 30 y.o. female presents for pregnancy confirmation with us , last appt w dr ford 2017 , last pap 10/26/20 neg @ [facility]  , doing well on Diclegas  able to drink and eat , denies flu vaccine     History of Present Illness    The following portions of the patient's history were reviewed and updated as appropriate: allergies, current medications, past family history, past medical history, past social history, past surgical history and problem list.    Review of Systems   Constitutional: Negative for chills, fatigue and fever.   Gastrointestinal: Positive for nausea and vomiting.   Genitourinary: Negative for dysuria, pelvic pain, vaginal bleeding, vaginal discharge and vaginal pain.   All other systems reviewed and are negative.  /68   Ht 157.5 cm (62\")   Wt 55.8 kg (123 lb)   LMP 10/04/2020 (Exact Date)   Breastfeeding No   BMI 22.50 kg/m²       Objective   Physical Exam  Vitals signs and nursing note reviewed.   Constitutional:       Appearance: Normal appearance. She is normal weight.   Pulmonary:      Effort: Pulmonary effort is normal.   Neurological:      Mental Status: She is alert and oriented to person, place, and time.   Psychiatric:         Behavior: Behavior normal.           Assessment/Plan   Diagnoses and all orders for this visit:    1. Visit for confirmation of pregnancy test result with physical exam (Primary)  -     Varicella Zoster Antibody, IgG  -     Urine Culture - Urine, Urine, Clean Catch  -     Rubella Antibody, IgG  -     RPR  -     HIV-1 / O / 2 Ag / Antibody 4th Generation  -     Hepatitis C Antibody  -     Hepatitis B Surface Antigen  -     Hgb. Frac. Profile  -     CBC & Differential  -     Antibody Screen  -     ABO / Rh  -     Chlamydia trachomatis, Neisseria gonorrhoeae, Trichomonas vaginalis, PCR - Urine, Urine, Clean Catch    2. Screening for blood or protein in urine  -     POC Urinalysis Dipstick      Counseling was given to patient " for the following topics: diagnostic results, instructions for management, risk factor reductions, impressions and importance of treatment compliance . Total time of the encounter was 30 minutes and 25 minutes was spend counseling.    Return in about 3 weeks (around 2/15/2021), or ob intake.

## 2021-01-27 LAB
ABO GROUP BLD: NORMAL
BACTERIA UR CULT: NO GROWTH
BACTERIA UR CULT: NORMAL
BASOPHILS # BLD AUTO: 0.1 X10E3/UL (ref 0–0.2)
BASOPHILS NFR BLD AUTO: 1 %
BLD GP AB SCN SERPL QL: NEGATIVE
C TRACH RRNA SPEC QL NAA+PROBE: NEGATIVE
EOSINOPHIL # BLD AUTO: 0.1 X10E3/UL (ref 0–0.4)
EOSINOPHIL NFR BLD AUTO: 1 %
ERYTHROCYTE [DISTWIDTH] IN BLOOD BY AUTOMATED COUNT: 12.7 % (ref 11.7–15.4)
HBV SURFACE AG SERPL QL IA: NEGATIVE
HCT VFR BLD AUTO: 39.3 % (ref 34–46.6)
HCV AB S/CO SERPL IA: <0.1 S/CO RATIO (ref 0–0.9)
HGB A MFR BLD: 97.4 % (ref 96.4–98.8)
HGB A2 MFR BLD COLUMN CHROM: 2.6 % (ref 1.8–3.2)
HGB BLD-MCNC: 13.3 G/DL (ref 11.1–15.9)
HGB C MFR BLD: 0 %
HGB F MFR BLD: 0 % (ref 0–2)
HGB FRACT BLD-IMP: NORMAL
HGB S BLD QL SOLY: NEGATIVE
HGB S MFR BLD: 0 %
HIV 1+2 AB+HIV1 P24 AG SERPL QL IA: NON REACTIVE
IMM GRANULOCYTES # BLD AUTO: 0 X10E3/UL (ref 0–0.1)
IMM GRANULOCYTES NFR BLD AUTO: 0 %
LYMPHOCYTES # BLD AUTO: 1.3 X10E3/UL (ref 0.7–3.1)
LYMPHOCYTES NFR BLD AUTO: 14 %
MCH RBC QN AUTO: 29.5 PG (ref 26.6–33)
MCHC RBC AUTO-ENTMCNC: 33.8 G/DL (ref 31.5–35.7)
MCV RBC AUTO: 87 FL (ref 79–97)
MONOCYTES # BLD AUTO: 0.6 X10E3/UL (ref 0.1–0.9)
MONOCYTES NFR BLD AUTO: 6 %
N GONORRHOEA RRNA SPEC QL NAA+PROBE: NEGATIVE
NEUTROPHILS # BLD AUTO: 7.1 X10E3/UL (ref 1.4–7)
NEUTROPHILS NFR BLD AUTO: 78 %
PLATELET # BLD AUTO: 288 X10E3/UL (ref 150–450)
RBC # BLD AUTO: 4.51 X10E6/UL (ref 3.77–5.28)
RH BLD: POSITIVE
RPR SER QL: NON REACTIVE
RUBV IGG SERPL IA-ACNC: <0.9 INDEX
T VAGINALIS DNA SPEC QL NAA+PROBE: NEGATIVE
VZV IGG SER IA-ACNC: 1100 INDEX
WBC # BLD AUTO: 9.2 X10E3/UL (ref 3.4–10.8)

## 2021-01-28 PROBLEM — Z28.39 RUBELLA NON-IMMUNE STATUS, ANTEPARTUM: Status: ACTIVE | Noted: 2021-01-28

## 2021-01-28 PROBLEM — O09.899 RUBELLA NON-IMMUNE STATUS, ANTEPARTUM: Status: ACTIVE | Noted: 2021-01-28

## 2021-02-15 ENCOUNTER — TELEPHONE (OUTPATIENT)
Dept: OBSTETRICS AND GYNECOLOGY | Age: 31
End: 2021-02-15

## 2021-02-16 NOTE — TELEPHONE ENCOUNTER
Tried calling pt to reschedule. No answer, no vm on cell phone, left vm on home phone number to reschedule appt

## 2021-02-19 ENCOUNTER — LAB (OUTPATIENT)
Dept: LAB | Facility: HOSPITAL | Age: 31
End: 2021-02-19

## 2021-02-19 ENCOUNTER — TRANSCRIBE ORDERS (OUTPATIENT)
Dept: SLEEP MEDICINE | Facility: HOSPITAL | Age: 31
End: 2021-02-19

## 2021-02-19 ENCOUNTER — PREP FOR SURGERY (OUTPATIENT)
Dept: OTHER | Facility: HOSPITAL | Age: 31
End: 2021-02-19

## 2021-02-19 ENCOUNTER — ROUTINE PRENATAL (OUTPATIENT)
Dept: OBSTETRICS AND GYNECOLOGY | Age: 31
End: 2021-02-19

## 2021-02-19 VITALS — WEIGHT: 123 LBS | BODY MASS INDEX: 22.5 KG/M2 | DIASTOLIC BLOOD PRESSURE: 68 MMHG | SYSTOLIC BLOOD PRESSURE: 104 MMHG

## 2021-02-19 DIAGNOSIS — Z98.891 HISTORY OF 2 CESAREAN SECTIONS: ICD-10-CM

## 2021-02-19 DIAGNOSIS — Z15.89 HETEROZYGOUS MTHFR MUTATION C677T: ICD-10-CM

## 2021-02-19 DIAGNOSIS — O02.1 MISSED ABORTION: Primary | ICD-10-CM

## 2021-02-19 DIAGNOSIS — Z13.89 SCREENING FOR BLOOD OR PROTEIN IN URINE: ICD-10-CM

## 2021-02-19 DIAGNOSIS — Z01.818 OTHER SPECIFIED PRE-OPERATIVE EXAMINATION: ICD-10-CM

## 2021-02-19 DIAGNOSIS — Z28.39 RUBELLA NON-IMMUNE STATUS, ANTEPARTUM: ICD-10-CM

## 2021-02-19 DIAGNOSIS — Z01.818 OTHER SPECIFIED PRE-OPERATIVE EXAMINATION: Primary | ICD-10-CM

## 2021-02-19 DIAGNOSIS — Z87.59 HISTORY OF PLACENTAL ABRUPTION: ICD-10-CM

## 2021-02-19 DIAGNOSIS — O09.899 RUBELLA NON-IMMUNE STATUS, ANTEPARTUM: ICD-10-CM

## 2021-02-19 LAB
GLUCOSE UR STRIP-MCNC: NEGATIVE MG/DL
PROT UR STRIP-MCNC: NEGATIVE MG/DL

## 2021-02-19 PROCEDURE — C9803 HOPD COVID-19 SPEC COLLECT: HCPCS

## 2021-02-19 PROCEDURE — 99214 OFFICE O/P EST MOD 30 MIN: CPT | Performed by: OBSTETRICS & GYNECOLOGY

## 2021-02-19 PROCEDURE — 81002 URINALYSIS NONAUTO W/O SCOPE: CPT | Performed by: OBSTETRICS & GYNECOLOGY

## 2021-02-19 PROCEDURE — U0004 COV-19 TEST NON-CDC HGH THRU: HCPCS

## 2021-02-19 RX ORDER — SODIUM CHLORIDE 0.9 % (FLUSH) 0.9 %
10 SYRINGE (ML) INJECTION AS NEEDED
Status: CANCELLED | OUTPATIENT
Start: 2021-02-22

## 2021-02-19 RX ORDER — DOXYCYCLINE 100 MG/1
100 CAPSULE ORAL ONCE
Status: CANCELLED | OUTPATIENT
Start: 2021-02-22 | End: 2021-02-19

## 2021-02-19 RX ORDER — SODIUM CHLORIDE 0.9 % (FLUSH) 0.9 %
3 SYRINGE (ML) INJECTION EVERY 12 HOURS SCHEDULED
Status: CANCELLED | OUTPATIENT
Start: 2021-02-22

## 2021-02-19 NOTE — PROGRESS NOTES
Chief Complaint   Patient presents with   • Routine Prenatal Visit     12 w DENISE 2021        HPI: 30 y.o.  at 12w6d     Pt presents today for OB intake  Pt of Dr. Le       Vitals:    21 1035   BP: 104/68   Weight: 55.8 kg (123 lb)       ROS:  GI:  Negative  : Negative  Pulmonary: Negative     A/P  1. Intrauterine pregnancy at 12w6d   2. Pregnancy Risk:  NORMAL    Diagnoses and all orders for this visit:    1. Screening for blood or protein in urine (Primary)  -     POC Urinalysis Dipstick        -----------------------  PLAN:   No follow-ups on file.      Sherry Zepeda, APRN  2021 10:43 EST

## 2021-02-19 NOTE — PROGRESS NOTES
Patient presents for ob intake today and there is no cardiac activity measuring 10 weeks   Denies vag bleeding or pelvic pain   Discussed options with her mother present including observation, Medical mgmt or D&C and patient wishes to proceed with D&C  Risks discussed including infection, bleeding, perforation - patient expressed understanding and desires to proceed.   Fever/bleeding warnings

## 2021-02-20 LAB — SARS-COV-2 ORF1AB RESP QL NAA+PROBE: NOT DETECTED

## 2021-02-22 ENCOUNTER — ANESTHESIA (OUTPATIENT)
Dept: PERIOP | Facility: HOSPITAL | Age: 31
End: 2021-02-22

## 2021-02-22 ENCOUNTER — ANESTHESIA EVENT (OUTPATIENT)
Dept: PERIOP | Facility: HOSPITAL | Age: 31
End: 2021-02-22

## 2021-02-22 ENCOUNTER — HOSPITAL ENCOUNTER (OUTPATIENT)
Facility: HOSPITAL | Age: 31
Setting detail: HOSPITAL OUTPATIENT SURGERY
Discharge: HOME | End: 2021-02-22
Attending: OBSTETRICS & GYNECOLOGY | Admitting: OBSTETRICS & GYNECOLOGY

## 2021-02-22 VITALS
OXYGEN SATURATION: 95 % | RESPIRATION RATE: 16 BRPM | TEMPERATURE: 97.6 F | HEIGHT: 62 IN | BODY MASS INDEX: 22.52 KG/M2 | SYSTOLIC BLOOD PRESSURE: 110 MMHG | HEART RATE: 75 BPM | WEIGHT: 122.36 LBS | DIASTOLIC BLOOD PRESSURE: 78 MMHG

## 2021-02-22 DIAGNOSIS — O02.1 MISSED ABORTION: ICD-10-CM

## 2021-02-22 LAB
ABO GROUP BLD: NORMAL
BLD GP AB SCN SERPL QL: NEGATIVE
RH BLD: POSITIVE
T&S EXPIRATION DATE: NORMAL

## 2021-02-22 PROCEDURE — 86901 BLOOD TYPING SEROLOGIC RH(D): CPT | Performed by: OBSTETRICS & GYNECOLOGY

## 2021-02-22 PROCEDURE — 25010000002 MIDAZOLAM PER 1 MG: Performed by: NURSE ANESTHETIST, CERTIFIED REGISTERED

## 2021-02-22 PROCEDURE — 25010000002 FENTANYL CITRATE (PF) 100 MCG/2ML SOLUTION: Performed by: NURSE ANESTHETIST, CERTIFIED REGISTERED

## 2021-02-22 PROCEDURE — 59820 CARE OF MISCARRIAGE: CPT | Performed by: OBSTETRICS & GYNECOLOGY

## 2021-02-22 PROCEDURE — 25010000002 METHYLERGONOVINE MALEATE PER 0.2 MG: Performed by: NURSE ANESTHETIST, CERTIFIED REGISTERED

## 2021-02-22 PROCEDURE — 86850 RBC ANTIBODY SCREEN: CPT | Performed by: OBSTETRICS & GYNECOLOGY

## 2021-02-22 PROCEDURE — 25010000002 KETOROLAC TROMETHAMINE PER 15 MG: Performed by: NURSE ANESTHETIST, CERTIFIED REGISTERED

## 2021-02-22 PROCEDURE — 86900 BLOOD TYPING SEROLOGIC ABO: CPT | Performed by: OBSTETRICS & GYNECOLOGY

## 2021-02-22 PROCEDURE — 88305 TISSUE EXAM BY PATHOLOGIST: CPT | Performed by: OBSTETRICS & GYNECOLOGY

## 2021-02-22 PROCEDURE — 25010000002 DEXAMETHASONE PER 1 MG: Performed by: NURSE ANESTHETIST, CERTIFIED REGISTERED

## 2021-02-22 PROCEDURE — 25010000002 ONDANSETRON PER 1 MG: Performed by: NURSE ANESTHETIST, CERTIFIED REGISTERED

## 2021-02-22 PROCEDURE — 25010000002 PROPOFOL 10 MG/ML EMULSION: Performed by: NURSE ANESTHETIST, CERTIFIED REGISTERED

## 2021-02-22 RX ORDER — IBUPROFEN 800 MG/1
800 TABLET ORAL EVERY 8 HOURS PRN
Qty: 20 TABLET | Refills: 0 | Status: SHIPPED | OUTPATIENT
Start: 2021-02-22 | End: 2021-06-07

## 2021-02-22 RX ORDER — SODIUM CHLORIDE, SODIUM LACTATE, POTASSIUM CHLORIDE, CALCIUM CHLORIDE 600; 310; 30; 20 MG/100ML; MG/100ML; MG/100ML; MG/100ML
9 INJECTION, SOLUTION INTRAVENOUS CONTINUOUS
Status: DISCONTINUED | OUTPATIENT
Start: 2021-02-22 | End: 2021-02-22 | Stop reason: HOSPADM

## 2021-02-22 RX ORDER — MIDAZOLAM HYDROCHLORIDE 1 MG/ML
INJECTION INTRAMUSCULAR; INTRAVENOUS AS NEEDED
Status: DISCONTINUED | OUTPATIENT
Start: 2021-02-22 | End: 2021-02-22 | Stop reason: SURG

## 2021-02-22 RX ORDER — FAMOTIDINE 10 MG/ML
20 INJECTION, SOLUTION INTRAVENOUS ONCE
Status: COMPLETED | OUTPATIENT
Start: 2021-02-22 | End: 2021-02-22

## 2021-02-22 RX ORDER — SODIUM CHLORIDE 0.9 % (FLUSH) 0.9 %
3-10 SYRINGE (ML) INJECTION AS NEEDED
Status: DISCONTINUED | OUTPATIENT
Start: 2021-02-22 | End: 2021-02-22 | Stop reason: HOSPADM

## 2021-02-22 RX ORDER — DOXYCYCLINE 100 MG/1
100 CAPSULE ORAL ONCE
Status: COMPLETED | OUTPATIENT
Start: 2021-02-22 | End: 2021-02-22

## 2021-02-22 RX ORDER — SODIUM CHLORIDE 0.9 % (FLUSH) 0.9 %
3 SYRINGE (ML) INJECTION EVERY 12 HOURS SCHEDULED
Status: DISCONTINUED | OUTPATIENT
Start: 2021-02-22 | End: 2021-02-22 | Stop reason: HOSPADM

## 2021-02-22 RX ORDER — MAGNESIUM HYDROXIDE 1200 MG/15ML
LIQUID ORAL AS NEEDED
Status: DISCONTINUED | OUTPATIENT
Start: 2021-02-22 | End: 2021-02-22 | Stop reason: HOSPADM

## 2021-02-22 RX ORDER — MIDAZOLAM HYDROCHLORIDE 1 MG/ML
1 INJECTION INTRAMUSCULAR; INTRAVENOUS
Status: DISCONTINUED | OUTPATIENT
Start: 2021-02-22 | End: 2021-02-22 | Stop reason: HOSPADM

## 2021-02-22 RX ORDER — LIDOCAINE HYDROCHLORIDE 10 MG/ML
0.5 INJECTION, SOLUTION EPIDURAL; INFILTRATION; INTRACAUDAL; PERINEURAL ONCE AS NEEDED
Status: DISCONTINUED | OUTPATIENT
Start: 2021-02-22 | End: 2021-02-22 | Stop reason: HOSPADM

## 2021-02-22 RX ORDER — FLUMAZENIL 0.1 MG/ML
0.2 INJECTION INTRAVENOUS AS NEEDED
Status: DISCONTINUED | OUTPATIENT
Start: 2021-02-22 | End: 2021-02-22 | Stop reason: HOSPADM

## 2021-02-22 RX ORDER — PROMETHAZINE HYDROCHLORIDE 25 MG/1
25 TABLET ORAL ONCE AS NEEDED
Status: DISCONTINUED | OUTPATIENT
Start: 2021-02-22 | End: 2021-02-22 | Stop reason: HOSPADM

## 2021-02-22 RX ORDER — FENTANYL CITRATE 50 UG/ML
INJECTION, SOLUTION INTRAMUSCULAR; INTRAVENOUS AS NEEDED
Status: DISCONTINUED | OUTPATIENT
Start: 2021-02-22 | End: 2021-02-22 | Stop reason: SURG

## 2021-02-22 RX ORDER — ONDANSETRON 2 MG/ML
INJECTION INTRAMUSCULAR; INTRAVENOUS AS NEEDED
Status: DISCONTINUED | OUTPATIENT
Start: 2021-02-22 | End: 2021-02-22 | Stop reason: SURG

## 2021-02-22 RX ORDER — FENTANYL CITRATE 50 UG/ML
50 INJECTION, SOLUTION INTRAMUSCULAR; INTRAVENOUS
Status: DISCONTINUED | OUTPATIENT
Start: 2021-02-22 | End: 2021-02-22 | Stop reason: HOSPADM

## 2021-02-22 RX ORDER — DEXAMETHASONE SODIUM PHOSPHATE 10 MG/ML
INJECTION INTRAMUSCULAR; INTRAVENOUS AS NEEDED
Status: DISCONTINUED | OUTPATIENT
Start: 2021-02-22 | End: 2021-02-22 | Stop reason: SURG

## 2021-02-22 RX ORDER — SODIUM CHLORIDE 0.9 % (FLUSH) 0.9 %
10 SYRINGE (ML) INJECTION AS NEEDED
Status: DISCONTINUED | OUTPATIENT
Start: 2021-02-22 | End: 2021-02-22 | Stop reason: HOSPADM

## 2021-02-22 RX ORDER — HYDROMORPHONE HYDROCHLORIDE 1 MG/ML
0.5 INJECTION, SOLUTION INTRAMUSCULAR; INTRAVENOUS; SUBCUTANEOUS
Status: DISCONTINUED | OUTPATIENT
Start: 2021-02-22 | End: 2021-02-22 | Stop reason: HOSPADM

## 2021-02-22 RX ORDER — MIDAZOLAM HYDROCHLORIDE 1 MG/ML
2 INJECTION INTRAMUSCULAR; INTRAVENOUS
Status: DISCONTINUED | OUTPATIENT
Start: 2021-02-22 | End: 2021-02-22 | Stop reason: HOSPADM

## 2021-02-22 RX ORDER — DOXYCYCLINE HYCLATE 100 MG/1
200 CAPSULE ORAL ONCE
Qty: 2 CAPSULE | Refills: 0 | Status: SHIPPED | OUTPATIENT
Start: 2021-02-22 | End: 2021-02-22

## 2021-02-22 RX ORDER — ONDANSETRON 2 MG/ML
4 INJECTION INTRAMUSCULAR; INTRAVENOUS ONCE AS NEEDED
Status: DISCONTINUED | OUTPATIENT
Start: 2021-02-22 | End: 2021-02-22 | Stop reason: HOSPADM

## 2021-02-22 RX ORDER — KETOROLAC TROMETHAMINE 30 MG/ML
INJECTION, SOLUTION INTRAMUSCULAR; INTRAVENOUS AS NEEDED
Status: DISCONTINUED | OUTPATIENT
Start: 2021-02-22 | End: 2021-02-22 | Stop reason: SURG

## 2021-02-22 RX ORDER — DIPHENHYDRAMINE HYDROCHLORIDE 50 MG/ML
12.5 INJECTION INTRAMUSCULAR; INTRAVENOUS
Status: DISCONTINUED | OUTPATIENT
Start: 2021-02-22 | End: 2021-02-22 | Stop reason: HOSPADM

## 2021-02-22 RX ORDER — EPHEDRINE SULFATE 50 MG/ML
5 INJECTION, SOLUTION INTRAVENOUS ONCE AS NEEDED
Status: DISCONTINUED | OUTPATIENT
Start: 2021-02-22 | End: 2021-02-22 | Stop reason: HOSPADM

## 2021-02-22 RX ORDER — HYDROCODONE BITARTRATE AND ACETAMINOPHEN 7.5; 325 MG/1; MG/1
1 TABLET ORAL ONCE AS NEEDED
Status: COMPLETED | OUTPATIENT
Start: 2021-02-22 | End: 2021-02-22

## 2021-02-22 RX ORDER — METHYLERGONOVINE MALEATE 0.2 MG/ML
INJECTION INTRAVENOUS AS NEEDED
Status: DISCONTINUED | OUTPATIENT
Start: 2021-02-22 | End: 2021-02-22 | Stop reason: SURG

## 2021-02-22 RX ORDER — HYDRALAZINE HYDROCHLORIDE 20 MG/ML
5 INJECTION INTRAMUSCULAR; INTRAVENOUS
Status: DISCONTINUED | OUTPATIENT
Start: 2021-02-22 | End: 2021-02-22 | Stop reason: HOSPADM

## 2021-02-22 RX ORDER — LIDOCAINE HYDROCHLORIDE 20 MG/ML
INJECTION, SOLUTION INFILTRATION; PERINEURAL AS NEEDED
Status: DISCONTINUED | OUTPATIENT
Start: 2021-02-22 | End: 2021-02-22 | Stop reason: SURG

## 2021-02-22 RX ORDER — IPRATROPIUM BROMIDE AND ALBUTEROL SULFATE 2.5; .5 MG/3ML; MG/3ML
3 SOLUTION RESPIRATORY (INHALATION) ONCE AS NEEDED
Status: DISCONTINUED | OUTPATIENT
Start: 2021-02-22 | End: 2021-02-22 | Stop reason: HOSPADM

## 2021-02-22 RX ORDER — PROMETHAZINE HYDROCHLORIDE 25 MG/1
25 SUPPOSITORY RECTAL ONCE AS NEEDED
Status: DISCONTINUED | OUTPATIENT
Start: 2021-02-22 | End: 2021-02-22 | Stop reason: HOSPADM

## 2021-02-22 RX ORDER — PROPOFOL 10 MG/ML
VIAL (ML) INTRAVENOUS AS NEEDED
Status: DISCONTINUED | OUTPATIENT
Start: 2021-02-22 | End: 2021-02-22 | Stop reason: SURG

## 2021-02-22 RX ORDER — OXYCODONE AND ACETAMINOPHEN 7.5; 325 MG/1; MG/1
1 TABLET ORAL ONCE AS NEEDED
Status: DISCONTINUED | OUTPATIENT
Start: 2021-02-22 | End: 2021-02-22 | Stop reason: HOSPADM

## 2021-02-22 RX ORDER — NALOXONE HCL 0.4 MG/ML
0.2 VIAL (ML) INJECTION AS NEEDED
Status: DISCONTINUED | OUTPATIENT
Start: 2021-02-22 | End: 2021-02-22 | Stop reason: HOSPADM

## 2021-02-22 RX ORDER — DIPHENHYDRAMINE HCL 25 MG
25 CAPSULE ORAL
Status: DISCONTINUED | OUTPATIENT
Start: 2021-02-22 | End: 2021-02-22 | Stop reason: HOSPADM

## 2021-02-22 RX ORDER — LABETALOL HYDROCHLORIDE 5 MG/ML
5 INJECTION, SOLUTION INTRAVENOUS
Status: DISCONTINUED | OUTPATIENT
Start: 2021-02-22 | End: 2021-02-22 | Stop reason: HOSPADM

## 2021-02-22 RX ADMIN — DOXYCYCLINE 100 MG: 100 CAPSULE ORAL at 06:48

## 2021-02-22 RX ADMIN — FENTANYL CITRATE 50 MCG: 50 INJECTION INTRAMUSCULAR; INTRAVENOUS at 07:34

## 2021-02-22 RX ADMIN — FAMOTIDINE 20 MG: 10 INJECTION INTRAVENOUS at 06:47

## 2021-02-22 RX ADMIN — PROPOFOL 50 MG: 10 INJECTION, EMULSION INTRAVENOUS at 07:30

## 2021-02-22 RX ADMIN — PROPOFOL 150 MG: 10 INJECTION, EMULSION INTRAVENOUS at 07:29

## 2021-02-22 RX ADMIN — MIDAZOLAM 2 MG: 1 INJECTION INTRAMUSCULAR; INTRAVENOUS at 07:27

## 2021-02-22 RX ADMIN — METHYLERGONOVINE MALEATE 200 MCG: 0.2 INJECTION, SOLUTION INTRAMUSCULAR; INTRAVENOUS at 07:59

## 2021-02-22 RX ADMIN — SODIUM CHLORIDE, POTASSIUM CHLORIDE, SODIUM LACTATE AND CALCIUM CHLORIDE: 600; 310; 30; 20 INJECTION, SOLUTION INTRAVENOUS at 07:23

## 2021-02-22 RX ADMIN — ONDANSETRON HYDROCHLORIDE 4 MG: 2 SOLUTION INTRAMUSCULAR; INTRAVENOUS at 07:53

## 2021-02-22 RX ADMIN — HYDROCODONE BITARTRATE AND ACETAMINOPHEN 1 TABLET: 7.5; 325 TABLET ORAL at 09:23

## 2021-02-22 RX ADMIN — KETOROLAC TROMETHAMINE 30 MG: 30 INJECTION, SOLUTION INTRAMUSCULAR; INTRAVENOUS at 08:00

## 2021-02-22 RX ADMIN — LIDOCAINE HYDROCHLORIDE 60 MG: 20 INJECTION, SOLUTION INFILTRATION; PERINEURAL at 07:29

## 2021-02-22 RX ADMIN — FENTANYL CITRATE 25 MCG: 50 INJECTION INTRAMUSCULAR; INTRAVENOUS at 07:53

## 2021-02-22 RX ADMIN — DEXAMETHASONE SODIUM PHOSPHATE 10 MG: 10 INJECTION INTRAMUSCULAR; INTRAVENOUS at 07:33

## 2021-02-22 RX ADMIN — FENTANYL CITRATE 25 MCG: 50 INJECTION INTRAMUSCULAR; INTRAVENOUS at 08:02

## 2021-02-22 NOTE — ANESTHESIA POSTPROCEDURE EVALUATION
Patient: Daphne Roberts    Procedure Summary     Date: 21 Room / Location: [facility] OR  [facility] MAIN OR    Anesthesia Start: 723 Anesthesia Stop: 815    Procedure: DILATATION AND CURETTAGE WITH SUCTION 10 WEEKS GESTATION (N/A Vagina) Diagnosis:       Missed       (Missed  [O02.1])    Surgeon: Delores Le MD Provider: Dony Carr MD    Anesthesia Type: general ASA Status: 2          Anesthesia Type: general    Vitals  Vitals Value Taken Time   /68 21 0844   Temp 36.4 °C (97.6 °F) 21 0812   Pulse 100 21 0840   Resp 16 21 0840   SpO2 99 % 21 0854   Vitals shown include unvalidated device data.        Post Anesthesia Care and Evaluation    Patient location during evaluation: PACU  Patient participation: complete - patient participated  Level of consciousness: awake and alert  Pain management: adequate  Airway patency: patent  Anesthetic complications: No anesthetic complications    Cardiovascular status: acceptable  Respiratory status: acceptable  Hydration status: acceptable    Comments: --------------------            21               0900     --------------------   BP:       106/76     Pulse:      79       Resp:       16       Temp:                SpO2:      97%      --------------------

## 2021-02-22 NOTE — ANESTHESIA PROCEDURE NOTES
Airway  Urgency: elective    Date/Time: 2/22/2021 7:31 AM  Airway not difficult    General Information and Staff    Patient location during procedure: OR  Anesthesiologist: Dony Carr MD  CRNA: Cecilia Bhandari CRNA    Indications and Patient Condition  Indications for airway management: airway protection    Preoxygenated: yes  MILS maintained throughout  Mask difficulty assessment: 0 - not attempted    Final Airway Details  Final airway type: supraglottic airway      Successful airway: classic  Size 4    Number of attempts at approach: 1  Assessment: lips, teeth, and gum same as pre-op and atraumatic intubation

## 2021-02-22 NOTE — OP NOTE
Operative Note    Date of Procedure: 2021    Patient:  Daphne Roberts   MR#: [id number]    PREOPERATIVE DIAGNOSIS:   Missed  [O02.1]    Post-Op Diagnosis Codes:     * Missed  [O02.1]      PROCEDURES PERFORMED:    1. Suction Dilation and Curretage      SURGEON: Delores Le MD    ANESTHESIA: General.      ESTIMATED BLOOD LOSS: 200 mL.      SPECIMENS:   Order Name Source Comment Collection Info Order Time   TYPE AND SCREEN   Collected By: Garcy Pool RN 2021  5:36 AM   TISSUE PATHOLOGY EXAM Products of Conception  Collected By: Delores Le MD 2021  8:05 AM       COMPLICATIONS: None.      INDICATIONS: See the written history and physical.      DESCRIPTION OF PROCEDURE:The patient is taken to the operating room and placed in supine position.  General anesthesia is administered and the surgical timeout for  procedure is performed.    The patient is prepped and draped in the usual sterile fashion with her legs positioned in candycane stirrups.  Weighted speculum is inserted into the vagina and the cervix is grasped anteriorly with a single-tooth tenaculum.  Sequential dilators were used to dilate the cervix to a maximum of 33 [place].  An 11 mm curved cannula is inserted into the uterine cavity and in a rotating fashion is used to evacuate retained products of conception.  The procedure is repeated approximately 3 times then a single pass is made with the sharp curette followed by final pass with the suction curet.  Findings were consistent with products of conception and scant bleeding is noted after the procedure.    The patient's awakened and taken recovery room in stable condition.  Patient's blood type is A +. Methergine x 1 given IM at end of case.       Delores Le MD  2021  08:35 EST

## 2021-02-22 NOTE — ANESTHESIA PREPROCEDURE EVALUATION
Anesthesia Evaluation     Patient summary reviewed and Nursing notes reviewed                Airway   Mallampati: II  TM distance: >3 FB  Neck ROM: full  No difficulty expected  Dental      Pulmonary    (+) asthma,  Cardiovascular     Rhythm: regular  Rate: normal    (+) valvular problems/murmurs MVP,       Neuro/Psych- negative ROS  GI/Hepatic/Renal/Endo - negative ROS     Musculoskeletal (-) negative ROS    Abdominal    Substance History - negative use     OB/GYN negative ob/gyn ROS         Other                        Anesthesia Plan    ASA 2     general     intravenous induction     Anesthetic plan, all risks, benefits, and alternatives have been provided, discussed and informed consent has been obtained with: patient.

## 2021-02-22 NOTE — H&P
[facility]   HISTORY AND PHYSICAL    Patient Name: Daphne Roberts  : 1990  MRN: [id number]  Primary Care Physician: Provider, No Known  Date of admission: 2021    Subjective   Subjective     Chief Complaint: Missed  at 10 weeks  History of Present Illness Patient presented for ob intake Friday in the office and was found to have a fetus with no cardiac activity measuring 10 weeks. She denied vag bleeding or pelvic pain.    Discussed options with her mother present including observation, Medical mgmt or D&C and patient wishes to proceed with D&C  Risks discussed including infection, bleeding, perforation - patient expressed understanding and desires to proceed.   Fever/bleeding warnings      Review of Systems   Constitutional: Negative for chills, fatigue and fever.   Gastrointestinal: Negative for abdominal distention and abdominal pain.   Genitourinary: Negative for dysuria, pelvic pain, vaginal bleeding, vaginal discharge and vaginal pain.   All other systems reviewed and are negative.       Personal History     Past Medical History:   Diagnosis Date   • Anemia    • Asthma    • H/O mitral valve prolapse     PER PATIENT   • Hx of MTHFR mutation     Single gene mutation with normal homocystine   • Shortened UT interval     PER PATIENT       Past Surgical History:   Procedure Laterality Date   •  SECTION     •  SECTION N/A 10/25/2017    Procedure:  SECTION REPEAT;  Surgeon: Delores Le MD;  Location: [facility] LABOR DELIVERY;  Service:    •  SECTION     • DILATATION AND CURETTAGE  2017    [facility]       Family History: family history is not on file. Otherwise pertinent FHx was reviewed and not pertinent to current issue.    Social History:  reports that she has never smoked. She has never used smokeless tobacco. She reports that she does not drink alcohol or use drugs.    Home Medications:  doxylamine-pyridoxine      Allergies:  No Known  Allergies    Objective    Objective     Vitals:  Temp:  [98.5 °F (36.9 °C)] 98.5 °F (36.9 °C)  Heart Rate:  [99] 99  Resp:  [20] 20  BP: (113)/(77) 113/77    Physical Exam  Vitals signs and nursing note reviewed.   Constitutional:       Appearance: Normal appearance. She is normal weight.   Pulmonary:      Effort: Pulmonary effort is normal.   Neurological:      Mental Status: She is alert and oriented to person, place, and time.   Psychiatric:         Mood and Affect: Mood normal.         Behavior: Behavior normal.         Result Review    Result Review:  I have personally reviewed the results from the time of this admission to 21 7:17 AM EST and agree with these findings:  [x]  Laboratory  []  Microbiology  []  Radiology  []  EKG/Telemetry   []  Cardiology/Vascular   []  Pathology  [x]  Old records  []  Other:      Assessment/Plan   Assessment / Plan     Brief Patient Summary:  Daphne Roberts is a 30 y.o. female who presents with missed  at 10 weeks.     Active Hospital Problems:  Active Hospital Problems    Diagnosis   • **Missed      Added automatically from request for surgery [id number]         Plan:         DVT prophylaxis:  SCD    CODE STATUS:    There are no questions and answers to display.       Admission Status:  I believe this patient meets op status.    Electronically signed by Delores Le MD, 21, 7:17 AM EST.

## 2021-02-23 ENCOUNTER — TELEPHONE (OUTPATIENT)
Dept: OBSTETRICS AND GYNECOLOGY | Age: 31
End: 2021-02-23

## 2021-02-23 NOTE — TELEPHONE ENCOUNTER
"Called pt to check on her.Pt states she is feeling\"\"ok\". Pt c/o back pain and cramping.Instructed to use Motrin Q 8hrs.dn  "

## 2021-03-01 ENCOUNTER — TELEPHONE (OUTPATIENT)
Dept: OBSTETRICS AND GYNECOLOGY | Age: 31
End: 2021-03-01

## 2021-03-01 DIAGNOSIS — O01.1 PARTIAL HYDATIDIFORM MOLE: Primary | ICD-10-CM

## 2021-03-01 LAB
DX PRELIMINARY: NORMAL
LAB AP CASE REPORT: NORMAL
LAB AP DIAGNOSIS COMMENT: NORMAL
PATH REPORT.FINAL DX SPEC: NORMAL
PATH REPORT.GROSS SPEC: NORMAL

## 2021-03-01 NOTE — TELEPHONE ENCOUNTER
Discussed partial mole results with patient and need for serial quants weekly until unprotected intercourse

## 2021-03-08 ENCOUNTER — OFFICE VISIT (OUTPATIENT)
Dept: OBSTETRICS AND GYNECOLOGY | Age: 31
End: 2021-03-08

## 2021-03-08 VITALS
BODY MASS INDEX: 22.63 KG/M2 | HEIGHT: 62 IN | SYSTOLIC BLOOD PRESSURE: 110 MMHG | WEIGHT: 123 LBS | DIASTOLIC BLOOD PRESSURE: 62 MMHG

## 2021-03-08 DIAGNOSIS — O01.1 PARTIAL HYDATIDIFORM MOLE: Primary | ICD-10-CM

## 2021-03-08 DIAGNOSIS — Z13.9 SPECIAL SCREENING: ICD-10-CM

## 2021-03-08 PROBLEM — Z28.39 RUBELLA NON-IMMUNE STATUS, ANTEPARTUM: Status: RESOLVED | Noted: 2021-01-28 | Resolved: 2021-03-08

## 2021-03-08 PROBLEM — O02.1 MISSED ABORTION: Status: RESOLVED | Noted: 2021-02-19 | Resolved: 2021-03-08

## 2021-03-08 PROBLEM — N80.6 ENDOMETRIOSIS IN SCAR OF SKIN: Status: ACTIVE | Noted: 2021-03-08

## 2021-03-08 PROBLEM — O09.899 RUBELLA NON-IMMUNE STATUS, ANTEPARTUM: Status: RESOLVED | Noted: 2021-01-28 | Resolved: 2021-03-08

## 2021-03-08 LAB
B-HCG UR QL: POSITIVE
INTERNAL NEGATIVE CONTROL: NEGATIVE
INTERNAL POSITIVE CONTROL: POSITIVE
Lab: ABNORMAL

## 2021-03-08 PROCEDURE — 99214 OFFICE O/P EST MOD 30 MIN: CPT | Performed by: OBSTETRICS & GYNECOLOGY

## 2021-03-08 PROCEDURE — 81025 URINE PREGNANCY TEST: CPT | Performed by: OBSTETRICS & GYNECOLOGY

## 2021-03-08 NOTE — PROGRESS NOTES
"Subjective   Daphne Roberts is a 30 y.o. female presents 2 wks post op from D&C 02/22/21 , not having any issues denies any bleeding or cramping , Last pap 10/26/20 neg .     History of Present Illness    The following portions of the patient's history were reviewed and updated as appropriate: allergies, current medications, past family history, past medical history, past social history, past surgical history and problem list.    Review of Systems   Constitutional: Negative for chills and fever.   Gastrointestinal: Negative for abdominal distention and abdominal pain.   Genitourinary: Negative for dyspareunia, dysuria, menstrual problem, pelvic pain, vaginal bleeding, vaginal discharge and vaginal pain.   All other systems reviewed and are negative.    /62   Ht 157.5 cm (62\")   Wt 55.8 kg (123 lb)   LMP 11/21/2020   Breastfeeding No   BMI 22.50 kg/m²     Objective   Physical Exam  Vitals and nursing note reviewed.   Constitutional:       Appearance: Normal appearance. She is normal weight.   Pulmonary:      Effort: Pulmonary effort is normal.   Neurological:      Mental Status: She is alert and oriented to person, place, and time.   Psychiatric:         Mood and Affect: Mood normal.         Behavior: Behavior normal.         Assessment/Plan   Diagnoses and all orders for this visit:    1. Partial hydatidiform mole (Primary)  -     HCG, B-subunit, Quantitative ([facility] Only)  -     HCG, B-subunit, Quantitative ([facility] Only); Future    2. Special screening  -     POC Pregnancy, Urine     Will get serial HCG  Abstain from intercourse until quant zero   Counseling was given to patient for the following topics: diagnostic results, instructions for management and importance of treatment compliance . Total time of the encounter was 30 minutes and 25 minutes was spend counseling.           "

## 2021-03-09 ENCOUNTER — TELEPHONE (OUTPATIENT)
Dept: OBSTETRICS AND GYNECOLOGY | Age: 31
End: 2021-03-09

## 2021-03-09 LAB — HCG INTACT+B SERPL-ACNC: 86 MIU/ML

## 2021-03-16 ENCOUNTER — TELEPHONE (OUTPATIENT)
Dept: OBSTETRICS AND GYNECOLOGY | Age: 31
End: 2021-03-16

## 2021-03-16 DIAGNOSIS — O01.1 PARTIAL HYDATIDIFORM MOLE: Primary | ICD-10-CM

## 2021-03-23 DIAGNOSIS — O01.1 PARTIAL HYDATIDIFORM MOLE: Primary | ICD-10-CM

## 2021-03-29 ENCOUNTER — TELEPHONE (OUTPATIENT)
Dept: OBSTETRICS AND GYNECOLOGY | Age: 31
End: 2021-03-29

## 2021-03-29 DIAGNOSIS — O01.1 PARTIAL HYDATIDIFORM MOLE: Primary | ICD-10-CM

## 2021-04-08 DIAGNOSIS — N30.00 ACUTE CYSTITIS WITHOUT HEMATURIA: Primary | ICD-10-CM

## 2021-04-08 RX ORDER — SULFAMETHOXAZOLE AND TRIMETHOPRIM 800; 160 MG/1; MG/1
1 TABLET ORAL 2 TIMES DAILY
Qty: 6 TABLET | Refills: 0 | Status: SHIPPED | OUTPATIENT
Start: 2021-04-08 | End: 2021-06-07

## 2021-04-12 ENCOUNTER — TELEPHONE (OUTPATIENT)
Dept: OBSTETRICS AND GYNECOLOGY | Age: 31
End: 2021-04-12

## 2021-04-12 DIAGNOSIS — O01.1 PARTIAL HYDATIDIFORM MOLE: Primary | ICD-10-CM

## 2021-04-12 NOTE — TELEPHONE ENCOUNTER
MRI of pelvis was previously ordered in 11/2020  but patient found out she was pregnant and could not go - does she need another order or can it be scheduled?

## 2021-04-28 ENCOUNTER — HOSPITAL ENCOUNTER (OUTPATIENT)
Dept: MRI IMAGING | Facility: HOSPITAL | Age: 31
Discharge: HOME | End: 2021-04-28
Admitting: NURSE PRACTITIONER

## 2021-04-28 DIAGNOSIS — N94.89 ADNEXAL MASS: ICD-10-CM

## 2021-04-28 DIAGNOSIS — R22.2 SUBCUTANEOUS MASS OF ABDOMINAL WALL: ICD-10-CM

## 2021-04-28 PROCEDURE — 72197 MRI PELVIS W/O & W/DYE: CPT

## 2021-04-28 PROCEDURE — A9577 INJ MULTIHANCE: HCPCS | Performed by: NURSE PRACTITIONER

## 2021-04-28 PROCEDURE — 0 GADOBENATE DIMEGLUMINE 529 MG/ML SOLUTION: Performed by: NURSE PRACTITIONER

## 2021-04-28 RX ADMIN — GADOBENATE DIMEGLUMINE 11 ML: 529 INJECTION, SOLUTION INTRAVENOUS at 10:49

## 2021-05-03 ENCOUNTER — APPOINTMENT (OUTPATIENT)
Dept: MRI IMAGING | Facility: HOSPITAL | Age: 31
End: 2021-05-03

## 2021-05-04 ENCOUNTER — PREP FOR SURGERY (OUTPATIENT)
Dept: OTHER | Facility: HOSPITAL | Age: 31
End: 2021-05-04

## 2021-05-04 ENCOUNTER — TELEPHONE (OUTPATIENT)
Dept: OBSTETRICS AND GYNECOLOGY | Age: 31
End: 2021-05-04

## 2021-05-04 DIAGNOSIS — N80.6 ENDOMETRIOSIS IN SCAR OF SKIN: Primary | ICD-10-CM

## 2021-05-04 RX ORDER — SODIUM CHLORIDE 0.9 % (FLUSH) 0.9 %
3 SYRINGE (ML) INJECTION EVERY 12 HOURS SCHEDULED
Status: CANCELLED | OUTPATIENT
Start: 2021-06-09

## 2021-05-04 RX ORDER — SODIUM CHLORIDE 0.9 % (FLUSH) 0.9 %
10 SYRINGE (ML) INJECTION AS NEEDED
Status: CANCELLED | OUTPATIENT
Start: 2021-06-09

## 2021-05-04 RX ORDER — CEFAZOLIN SODIUM 2 G/100ML
2 INJECTION, SOLUTION INTRAVENOUS ONCE
Status: CANCELLED | OUTPATIENT
Start: 2021-06-09 | End: 2021-05-04

## 2021-05-04 NOTE — TELEPHONE ENCOUNTER
Called patient - discussed MRI findings - patient desires removal - discussed risks including infection, bleeding and recurrence. Patient expressed understanding and desires to schedule.

## 2021-05-04 NOTE — TELEPHONE ENCOUNTER
----- Message from Wm Barraza MD sent at 5/3/2021 11:22 AM EDT -----  I would not.  I would excise the endometrioma.    Hb        ----- Message -----  From: Delorse Le MD  Sent: 5/3/2021  10:58 AM EDT  To: MD Shukri Priest - would you do or send to Gen surg?   ----- Message -----  From: Sherry Zepeda APRN  Sent: 4/29/2021   9:09 AM EDT  To: MD Shukri Huitron - do you mind reviewing this patients MRI. Not sure if this is something to refer to general surgery or if this is something you would manage. Thanks!  ----- Message -----  From: Ran, Rad Results [place]  Sent: 4/28/2021   1:12 PM EDT  To: YSABEL Rabago

## 2021-06-07 ENCOUNTER — PRE-ADMISSION TESTING (OUTPATIENT)
Dept: PREADMISSION TESTING | Facility: HOSPITAL | Age: 31
End: 2021-06-07

## 2021-06-07 VITALS
TEMPERATURE: 98.2 F | OXYGEN SATURATION: 100 % | HEART RATE: 67 BPM | WEIGHT: 133 LBS | RESPIRATION RATE: 20 BRPM | DIASTOLIC BLOOD PRESSURE: 80 MMHG | HEIGHT: 62 IN | SYSTOLIC BLOOD PRESSURE: 124 MMHG | BODY MASS INDEX: 24.48 KG/M2

## 2021-06-07 LAB
ANION GAP SERPL CALCULATED.3IONS-SCNC: 11.4 MMOL/L (ref 5–15)
BUN SERPL-MCNC: 8 MG/DL (ref 6–20)
BUN/CREAT SERPL: 9.8 (ref 7–25)
CALCIUM SPEC-SCNC: 9 MG/DL (ref 8.6–10.5)
CHLORIDE SERPL-SCNC: 108 MMOL/L (ref 98–107)
CO2 SERPL-SCNC: 22.6 MMOL/L (ref 22–29)
CREAT SERPL-MCNC: 0.82 MG/DL (ref 0.57–1)
DEPRECATED RDW RBC AUTO: 37.7 FL (ref 37–54)
ERYTHROCYTE [DISTWIDTH] IN BLOOD BY AUTOMATED COUNT: 12.3 % (ref 12.3–15.4)
GFR SERPL CREATININE-BSD FRML MDRD: 82 ML/MIN/1.73
GLUCOSE SERPL-MCNC: 74 MG/DL (ref 65–99)
HCG SERPL QL: NEGATIVE
HCT VFR BLD AUTO: 33.8 % (ref 34–46.6)
HGB BLD-MCNC: 11.4 G/DL (ref 12–15.9)
MCH RBC QN AUTO: 28.9 PG (ref 26.6–33)
MCHC RBC AUTO-ENTMCNC: 33.7 G/DL (ref 31.5–35.7)
MCV RBC AUTO: 85.8 FL (ref 79–97)
PLATELET # BLD AUTO: 230 10*3/MM3 (ref 140–450)
PMV BLD AUTO: 11.2 FL (ref 6–12)
POTASSIUM SERPL-SCNC: 3.9 MMOL/L (ref 3.5–5.2)
RBC # BLD AUTO: 3.94 10*6/MM3 (ref 3.77–5.28)
SARS-COV-2 ORF1AB RESP QL NAA+PROBE: NOT DETECTED
SODIUM SERPL-SCNC: 142 MMOL/L (ref 136–145)
WBC # BLD AUTO: 5.44 10*3/MM3 (ref 3.4–10.8)

## 2021-06-07 PROCEDURE — 80048 BASIC METABOLIC PNL TOTAL CA: CPT

## 2021-06-07 PROCEDURE — 84703 CHORIONIC GONADOTROPIN ASSAY: CPT

## 2021-06-07 PROCEDURE — 85027 COMPLETE CBC AUTOMATED: CPT

## 2021-06-07 PROCEDURE — U0004 COV-19 TEST NON-CDC HGH THRU: HCPCS | Performed by: NURSE PRACTITIONER

## 2021-06-07 PROCEDURE — C9803 HOPD COVID-19 SPEC COLLECT: HCPCS | Performed by: NURSE PRACTITIONER

## 2021-06-07 PROCEDURE — 36415 COLL VENOUS BLD VENIPUNCTURE: CPT

## 2021-06-07 RX ORDER — CHLORHEXIDINE GLUCONATE 500 MG/1
1 CLOTH TOPICAL TAKE AS DIRECTED
COMMUNITY
End: 2021-06-09 | Stop reason: HOSPADM

## 2021-06-07 RX ORDER — ACETAMINOPHEN 325 MG/1
325 TABLET ORAL EVERY 6 HOURS PRN
COMMUNITY
End: 2021-06-09 | Stop reason: HOSPADM

## 2021-06-07 RX ORDER — FEXOFENADINE HCL 180 MG/1
180 TABLET ORAL AS NEEDED
COMMUNITY
End: 2022-03-31

## 2021-06-09 ENCOUNTER — ANESTHESIA EVENT (OUTPATIENT)
Dept: PERIOP | Facility: HOSPITAL | Age: 31
End: 2021-06-09

## 2021-06-09 ENCOUNTER — ANESTHESIA (OUTPATIENT)
Dept: PERIOP | Facility: HOSPITAL | Age: 31
End: 2021-06-09

## 2021-06-09 ENCOUNTER — HOSPITAL ENCOUNTER (OUTPATIENT)
Facility: HOSPITAL | Age: 31
Setting detail: HOSPITAL OUTPATIENT SURGERY
Discharge: HOME | End: 2021-06-09
Attending: OBSTETRICS & GYNECOLOGY | Admitting: OBSTETRICS & GYNECOLOGY

## 2021-06-09 VITALS
WEIGHT: 131.4 LBS | RESPIRATION RATE: 16 BRPM | BODY MASS INDEX: 24.18 KG/M2 | OXYGEN SATURATION: 100 % | HEART RATE: 83 BPM | HEIGHT: 62 IN | SYSTOLIC BLOOD PRESSURE: 125 MMHG | TEMPERATURE: 98.4 F | DIASTOLIC BLOOD PRESSURE: 89 MMHG

## 2021-06-09 DIAGNOSIS — N80.6 ENDOMETRIOSIS IN SCAR OF SKIN: ICD-10-CM

## 2021-06-09 LAB
B-HCG UR QL: NEGATIVE
INTERNAL NEGATIVE CONTROL: NORMAL
INTERNAL POSITIVE CONTROL: NORMAL
Lab: NORMAL

## 2021-06-09 PROCEDURE — 25010000003 CEFAZOLIN IN DEXTROSE 2-4 GM/100ML-% SOLUTION: Performed by: OBSTETRICS & GYNECOLOGY

## 2021-06-09 PROCEDURE — 12031 INTMD RPR S/A/T/EXT 2.5 CM/<: CPT | Performed by: OBSTETRICS & GYNECOLOGY

## 2021-06-09 PROCEDURE — 25010000002 FENTANYL CITRATE (PF) 50 MCG/ML SOLUTION: Performed by: NURSE ANESTHETIST, CERTIFIED REGISTERED

## 2021-06-09 PROCEDURE — 25010000002 ONDANSETRON PER 1 MG: Performed by: NURSE ANESTHETIST, CERTIFIED REGISTERED

## 2021-06-09 PROCEDURE — 25010000002 DEXAMETHASONE PER 1 MG: Performed by: NURSE ANESTHETIST, CERTIFIED REGISTERED

## 2021-06-09 PROCEDURE — 81025 URINE PREGNANCY TEST: CPT | Performed by: ANESTHESIOLOGY

## 2021-06-09 PROCEDURE — 25010000002 PROPOFOL 10 MG/ML EMULSION: Performed by: NURSE ANESTHETIST, CERTIFIED REGISTERED

## 2021-06-09 PROCEDURE — 88304 TISSUE EXAM BY PATHOLOGIST: CPT | Performed by: OBSTETRICS & GYNECOLOGY

## 2021-06-09 PROCEDURE — 11402 EXC TR-EXT B9+MARG 1.1-2 CM: CPT | Performed by: OBSTETRICS & GYNECOLOGY

## 2021-06-09 RX ORDER — DEXAMETHASONE SODIUM PHOSPHATE 10 MG/ML
INJECTION INTRAMUSCULAR; INTRAVENOUS AS NEEDED
Status: DISCONTINUED | OUTPATIENT
Start: 2021-06-09 | End: 2021-06-09 | Stop reason: SURG

## 2021-06-09 RX ORDER — SODIUM CHLORIDE 0.9 % (FLUSH) 0.9 %
10 SYRINGE (ML) INJECTION AS NEEDED
Status: DISCONTINUED | OUTPATIENT
Start: 2021-06-09 | End: 2021-06-09 | Stop reason: HOSPADM

## 2021-06-09 RX ORDER — ONDANSETRON 2 MG/ML
INJECTION INTRAMUSCULAR; INTRAVENOUS AS NEEDED
Status: DISCONTINUED | OUTPATIENT
Start: 2021-06-09 | End: 2021-06-09 | Stop reason: SURG

## 2021-06-09 RX ORDER — LIDOCAINE HYDROCHLORIDE 20 MG/ML
INJECTION, SOLUTION INFILTRATION; PERINEURAL AS NEEDED
Status: DISCONTINUED | OUTPATIENT
Start: 2021-06-09 | End: 2021-06-09 | Stop reason: SURG

## 2021-06-09 RX ORDER — OXYCODONE AND ACETAMINOPHEN 10; 325 MG/1; MG/1
1 TABLET ORAL EVERY 4 HOURS PRN
Status: DISCONTINUED | OUTPATIENT
Start: 2021-06-09 | End: 2021-06-09 | Stop reason: HOSPADM

## 2021-06-09 RX ORDER — LABETALOL HYDROCHLORIDE 5 MG/ML
5 INJECTION, SOLUTION INTRAVENOUS
Status: DISCONTINUED | OUTPATIENT
Start: 2021-06-09 | End: 2021-06-09 | Stop reason: HOSPADM

## 2021-06-09 RX ORDER — MAGNESIUM HYDROXIDE 1200 MG/15ML
LIQUID ORAL AS NEEDED
Status: DISCONTINUED | OUTPATIENT
Start: 2021-06-09 | End: 2021-06-09 | Stop reason: HOSPADM

## 2021-06-09 RX ORDER — CEFAZOLIN SODIUM 2 G/100ML
2 INJECTION, SOLUTION INTRAVENOUS ONCE
Status: COMPLETED | OUTPATIENT
Start: 2021-06-09 | End: 2021-06-09

## 2021-06-09 RX ORDER — ALBUTEROL SULFATE 2.5 MG/3ML
2.5 SOLUTION RESPIRATORY (INHALATION) ONCE AS NEEDED
Status: DISCONTINUED | OUTPATIENT
Start: 2021-06-09 | End: 2021-06-09 | Stop reason: HOSPADM

## 2021-06-09 RX ORDER — FENTANYL CITRATE 50 UG/ML
50 INJECTION, SOLUTION INTRAMUSCULAR; INTRAVENOUS
Status: DISCONTINUED | OUTPATIENT
Start: 2021-06-09 | End: 2021-06-09 | Stop reason: HOSPADM

## 2021-06-09 RX ORDER — GLYCOPYRROLATE 0.2 MG/ML
INJECTION INTRAMUSCULAR; INTRAVENOUS AS NEEDED
Status: DISCONTINUED | OUTPATIENT
Start: 2021-06-09 | End: 2021-06-09 | Stop reason: SURG

## 2021-06-09 RX ORDER — FENTANYL CITRATE 50 UG/ML
25 INJECTION, SOLUTION INTRAMUSCULAR; INTRAVENOUS
Status: DISCONTINUED | OUTPATIENT
Start: 2021-06-09 | End: 2021-06-09 | Stop reason: HOSPADM

## 2021-06-09 RX ORDER — IBUPROFEN 600 MG/1
600 TABLET ORAL ONCE AS NEEDED
Status: DISCONTINUED | OUTPATIENT
Start: 2021-06-09 | End: 2021-06-09 | Stop reason: HOSPADM

## 2021-06-09 RX ORDER — PROMETHAZINE HYDROCHLORIDE 25 MG/1
25 TABLET ORAL ONCE AS NEEDED
Status: DISCONTINUED | OUTPATIENT
Start: 2021-06-09 | End: 2021-06-09 | Stop reason: HOSPADM

## 2021-06-09 RX ORDER — SODIUM CHLORIDE 0.9 % (FLUSH) 0.9 %
3 SYRINGE (ML) INJECTION EVERY 12 HOURS SCHEDULED
Status: DISCONTINUED | OUTPATIENT
Start: 2021-06-09 | End: 2021-06-09 | Stop reason: HOSPADM

## 2021-06-09 RX ORDER — DIPHENHYDRAMINE HCL 25 MG
25 CAPSULE ORAL
Status: DISCONTINUED | OUTPATIENT
Start: 2021-06-09 | End: 2021-06-09 | Stop reason: HOSPADM

## 2021-06-09 RX ORDER — FAMOTIDINE 10 MG/ML
20 INJECTION, SOLUTION INTRAVENOUS ONCE
Status: COMPLETED | OUTPATIENT
Start: 2021-06-09 | End: 2021-06-09

## 2021-06-09 RX ORDER — FLUMAZENIL 0.1 MG/ML
0.2 INJECTION INTRAVENOUS AS NEEDED
Status: DISCONTINUED | OUTPATIENT
Start: 2021-06-09 | End: 2021-06-09 | Stop reason: HOSPADM

## 2021-06-09 RX ORDER — SODIUM CHLORIDE, SODIUM LACTATE, POTASSIUM CHLORIDE, CALCIUM CHLORIDE 600; 310; 30; 20 MG/100ML; MG/100ML; MG/100ML; MG/100ML
9 INJECTION, SOLUTION INTRAVENOUS CONTINUOUS
Status: DISCONTINUED | OUTPATIENT
Start: 2021-06-09 | End: 2021-06-09 | Stop reason: HOSPADM

## 2021-06-09 RX ORDER — NALOXONE HCL 0.4 MG/ML
0.2 VIAL (ML) INJECTION AS NEEDED
Status: DISCONTINUED | OUTPATIENT
Start: 2021-06-09 | End: 2021-06-09 | Stop reason: HOSPADM

## 2021-06-09 RX ORDER — EPHEDRINE SULFATE 50 MG/ML
5 INJECTION, SOLUTION INTRAVENOUS ONCE AS NEEDED
Status: DISCONTINUED | OUTPATIENT
Start: 2021-06-09 | End: 2021-06-09 | Stop reason: HOSPADM

## 2021-06-09 RX ORDER — SODIUM CHLORIDE 0.9 % (FLUSH) 0.9 %
3-10 SYRINGE (ML) INJECTION AS NEEDED
Status: DISCONTINUED | OUTPATIENT
Start: 2021-06-09 | End: 2021-06-09 | Stop reason: HOSPADM

## 2021-06-09 RX ORDER — HYDROMORPHONE HYDROCHLORIDE 1 MG/ML
0.25 INJECTION, SOLUTION INTRAMUSCULAR; INTRAVENOUS; SUBCUTANEOUS
Status: DISCONTINUED | OUTPATIENT
Start: 2021-06-09 | End: 2021-06-09 | Stop reason: HOSPADM

## 2021-06-09 RX ORDER — ONDANSETRON 2 MG/ML
4 INJECTION INTRAMUSCULAR; INTRAVENOUS ONCE AS NEEDED
Status: DISCONTINUED | OUTPATIENT
Start: 2021-06-09 | End: 2021-06-09 | Stop reason: HOSPADM

## 2021-06-09 RX ORDER — PROPOFOL 10 MG/ML
VIAL (ML) INTRAVENOUS AS NEEDED
Status: DISCONTINUED | OUTPATIENT
Start: 2021-06-09 | End: 2021-06-09 | Stop reason: SURG

## 2021-06-09 RX ORDER — HYDROCODONE BITARTRATE AND ACETAMINOPHEN 7.5; 325 MG/1; MG/1
1 TABLET ORAL ONCE AS NEEDED
Status: DISCONTINUED | OUTPATIENT
Start: 2021-06-09 | End: 2021-06-09 | Stop reason: HOSPADM

## 2021-06-09 RX ORDER — FENTANYL CITRATE 50 UG/ML
INJECTION, SOLUTION INTRAMUSCULAR; INTRAVENOUS AS NEEDED
Status: DISCONTINUED | OUTPATIENT
Start: 2021-06-09 | End: 2021-06-09 | Stop reason: SURG

## 2021-06-09 RX ORDER — MIDAZOLAM HYDROCHLORIDE 1 MG/ML
1 INJECTION INTRAMUSCULAR; INTRAVENOUS
Status: DISCONTINUED | OUTPATIENT
Start: 2021-06-09 | End: 2021-06-09 | Stop reason: HOSPADM

## 2021-06-09 RX ORDER — OXYCODONE HYDROCHLORIDE AND ACETAMINOPHEN 5; 325 MG/1; MG/1
1 TABLET ORAL EVERY 4 HOURS PRN
Qty: 10 TABLET | Refills: 0 | Status: SHIPPED | OUTPATIENT
Start: 2021-06-09 | End: 2022-04-11

## 2021-06-09 RX ORDER — PROMETHAZINE HYDROCHLORIDE 25 MG/1
25 SUPPOSITORY RECTAL ONCE AS NEEDED
Status: DISCONTINUED | OUTPATIENT
Start: 2021-06-09 | End: 2021-06-09 | Stop reason: HOSPADM

## 2021-06-09 RX ORDER — LIDOCAINE HYDROCHLORIDE 10 MG/ML
0.5 INJECTION, SOLUTION EPIDURAL; INFILTRATION; INTRACAUDAL; PERINEURAL ONCE AS NEEDED
Status: DISCONTINUED | OUTPATIENT
Start: 2021-06-09 | End: 2021-06-09 | Stop reason: HOSPADM

## 2021-06-09 RX ORDER — DIPHENHYDRAMINE HYDROCHLORIDE 50 MG/ML
12.5 INJECTION INTRAMUSCULAR; INTRAVENOUS
Status: DISCONTINUED | OUTPATIENT
Start: 2021-06-09 | End: 2021-06-09 | Stop reason: HOSPADM

## 2021-06-09 RX ADMIN — SODIUM CHLORIDE, POTASSIUM CHLORIDE, SODIUM LACTATE AND CALCIUM CHLORIDE 9 ML/HR: 600; 310; 30; 20 INJECTION, SOLUTION INTRAVENOUS at 12:37

## 2021-06-09 RX ADMIN — LIDOCAINE HYDROCHLORIDE 80 MG: 20 INJECTION, SOLUTION INFILTRATION; PERINEURAL at 14:52

## 2021-06-09 RX ADMIN — OXYCODONE AND ACETAMINOPHEN 1 TABLET: 325; 10 TABLET ORAL at 16:03

## 2021-06-09 RX ADMIN — DEXAMETHASONE SODIUM PHOSPHATE 10 MG: 10 INJECTION INTRAMUSCULAR; INTRAVENOUS at 15:03

## 2021-06-09 RX ADMIN — PROPOFOL 130 MG: 10 INJECTION, EMULSION INTRAVENOUS at 14:52

## 2021-06-09 RX ADMIN — ONDANSETRON 4 MG: 2 INJECTION INTRAMUSCULAR; INTRAVENOUS at 15:03

## 2021-06-09 RX ADMIN — FAMOTIDINE 20 MG: 10 INJECTION INTRAVENOUS at 12:44

## 2021-06-09 RX ADMIN — FENTANYL CITRATE 50 MCG: 50 INJECTION INTRAMUSCULAR; INTRAVENOUS at 15:00

## 2021-06-09 RX ADMIN — FENTANYL CITRATE 50 MCG: 50 INJECTION INTRAMUSCULAR; INTRAVENOUS at 14:50

## 2021-06-09 RX ADMIN — GLYCOPYRROLATE 0.2 MG: 0.2 INJECTION INTRAMUSCULAR; INTRAVENOUS at 14:50

## 2021-06-09 RX ADMIN — CEFAZOLIN SODIUM 2 G: 2 INJECTION, SOLUTION INTRAVENOUS at 14:40

## 2021-06-09 NOTE — ANESTHESIA POSTPROCEDURE EVALUATION
"Patient: Daphne Roberts    Procedure Summary     Date: 06/09/21 Room / Location:  [facility] OR 03 /  [facility] MAIN OR    Anesthesia Start: 1445 Anesthesia Stop: 1534    Procedure: EXCISION MASS TRUNK (N/A ) Diagnosis:       Endometriosis in scar of skin      (Endometriosis in scar of skin [N80.6])    Surgeons: Delores Le MD Provider: Volodymyr Craft MD    Anesthesia Type: general ASA Status: 2          Anesthesia Type: general    Vitals  Vitals Value Taken Time   /92 06/09/21 1600   Temp 36.9 °C (98.4 °F) 06/09/21 1533   Pulse 83 06/09/21 1605   Resp 16 06/09/21 1600   SpO2 96 % 06/09/21 1605   Vitals shown include unvalidated device data.        Post Anesthesia Care and Evaluation    Patient location during evaluation: bedside  Patient participation: complete - patient participated  Level of consciousness: awake and alert  Pain management: adequate  Airway patency: patent  Anesthetic complications: No anesthetic complications  PONV Status: none  Cardiovascular status: acceptable and hemodynamically stable  Respiratory status: acceptable and spontaneous ventilation  Hydration status: acceptable    Comments: /89   Pulse 83   Temp 36.9 °C (98.4 °F) (Oral)   Resp 16   Ht 157.5 cm (62\")   Wt 59.6 kg (131 lb 6.4 oz)   LMP 06/04/2021   SpO2 100%   BMI 24.03 kg/m²         "

## 2021-06-09 NOTE — ANESTHESIA PREPROCEDURE EVALUATION
Anesthesia Evaluation     Patient summary reviewed and Nursing notes reviewed                Airway   Mallampati: I  TM distance: >3 FB  Neck ROM: full  No difficulty expected  Dental      Pulmonary    (+) asthma,  Cardiovascular - negative cardio ROS    Rhythm: regular  Rate: normal        Neuro/Psych- negative ROS  GI/Hepatic/Renal/Endo - negative ROS     Musculoskeletal (-) negative ROS    Abdominal    Substance History - negative use     OB/GYN negative ob/gyn ROS         Other                        Anesthesia Plan    ASA 2     general   (I have reviewed the patient's history with the patient and the chart, including all pertinent laboratory results and imaging. I have explained the risks of anesthesia including but not limited to dental damage, corneal abrasion, nerve injury, MI, stroke, and death. Questions asked and answered. Anesthetic plan discussed with patient and team as indicated. Patient expressed understanding of the above.  )  intravenous induction     Anesthetic plan, all risks, benefits, and alternatives have been provided, discussed and informed consent has been obtained with: patient.

## 2021-06-09 NOTE — ANESTHESIA PROCEDURE NOTES
Airway  Urgency: elective    Date/Time: 6/9/2021 2:54 PM  Airway not difficult    General Information and Staff    Patient location during procedure: OR  Anesthesiologist: Volodymyr Craft MD  CRNA: John Knutson CRNA    Indications and Patient Condition  Indications for airway management: airway protection    Preoxygenated: yes  Mask difficulty assessment: 1 - vent by mask    Final Airway Details  Final airway type: supraglottic airway      Successful airway: unique  Size 4  Cuff Pressure (cm H2O): 15  Airway Seal Pressure (cm H2O): 22    Cormack-Lehane Classification: grade I - full view of glottis  Number of attempts at approach: 1

## 2021-06-11 LAB
LAB AP CASE REPORT: NORMAL
PATH REPORT.FINAL DX SPEC: NORMAL
PATH REPORT.GROSS SPEC: NORMAL

## 2021-06-21 ENCOUNTER — OFFICE VISIT (OUTPATIENT)
Dept: OBSTETRICS AND GYNECOLOGY | Age: 31
End: 2021-06-21

## 2021-06-21 VITALS
SYSTOLIC BLOOD PRESSURE: 120 MMHG | HEIGHT: 62 IN | BODY MASS INDEX: 24.11 KG/M2 | WEIGHT: 131 LBS | DIASTOLIC BLOOD PRESSURE: 76 MMHG

## 2021-06-21 DIAGNOSIS — Z13.89 SCREENING FOR BLOOD OR PROTEIN IN URINE: ICD-10-CM

## 2021-06-21 DIAGNOSIS — Z98.890 POST-OPERATIVE STATE: Primary | ICD-10-CM

## 2021-06-21 LAB
BILIRUB BLD-MCNC: NEGATIVE MG/DL
GLUCOSE UR STRIP-MCNC: NEGATIVE MG/DL
KETONES UR QL: NEGATIVE
LEUKOCYTE EST, POC: NEGATIVE
NITRITE UR-MCNC: NEGATIVE MG/ML
PH UR: 7 [PH] (ref 5–8)
PROT UR STRIP-MCNC: NEGATIVE MG/DL
RBC # UR STRIP: NEGATIVE /UL
SP GR UR: 1.02 (ref 1–1.03)
UROBILINOGEN UR QL: NORMAL

## 2021-06-21 PROCEDURE — 81002 URINALYSIS NONAUTO W/O SCOPE: CPT | Performed by: OBSTETRICS & GYNECOLOGY

## 2021-06-21 PROCEDURE — 99024 POSTOP FOLLOW-UP VISIT: CPT | Performed by: OBSTETRICS & GYNECOLOGY

## 2021-06-21 NOTE — PROGRESS NOTES
"Subjective   Daphne Roberts is a 31 y.o. female CC: 1 wk 5 days pp EXCISION MASS TRUNK on 06/09/21- doing well no complaints. Has not had a period yet.     Path reviewed -  1. Soft Tissue, Right Abdomen, Excision:                A. Fibroconnective tissue with endometrial glands and stroma, consistent with endometrioma.           History of Present Illness    The following portions of the patient's history were reviewed and updated as appropriate: allergies, current medications, past family history, past medical history, past social history, past surgical history and problem list.    Review of Systems   Constitutional: Negative for chills and fever.   Gastrointestinal: Negative for abdominal distention and abdominal pain.   All other systems reviewed and are negative.    /76   Ht 157.5 cm (62\")   Wt 59.4 kg (131 lb)   LMP 06/04/2021 (Approximate)   Breastfeeding No   BMI 23.96 kg/m²     Objective   Physical Exam  Vitals and nursing note reviewed.   Constitutional:       Appearance: Normal appearance. She is normal weight.   Abdominal:      General: There is no distension.      Palpations: Abdomen is soft.      Tenderness: There is no abdominal tenderness.      Comments: Inc C/D/I    Neurological:      Mental Status: She is alert and oriented to person, place, and time.   Psychiatric:         Mood and Affect: Mood normal.         Behavior: Behavior normal.         Assessment/Plan   Diagnoses and all orders for this visit:    1. Post-operative state (Primary)    2. Screening for blood or protein in urine  -     POC Urinalysis Dipstick       Counseling was given to patient for the following topics: instructions for management, impressions and importance of treatment compliance . Total time of the encounter was 20 minutes and 15 minutes was spent counseling.           "

## 2022-03-28 ENCOUNTER — TELEPHONE (OUTPATIENT)
Dept: OBSTETRICS AND GYNECOLOGY | Age: 32
End: 2022-03-28

## 2022-03-28 NOTE — TELEPHONE ENCOUNTER
Pt scheduled 5/2/22 for + preg, lmp 2/18/22..the patient reports prior molar pregnancy, is this date OK for confirmation?

## 2022-03-31 RX ORDER — DOXYLAMINE SUCCINATE AND PYRIDOXINE HYDROCHLORIDE, DELAYED RELEASE TABLETS 10 MG/10 MG 10; 10 MG/1; MG/1
2 TABLET, DELAYED RELEASE ORAL NIGHTLY PRN
Qty: 60 TABLET | Refills: 1 | Status: SHIPPED | OUTPATIENT
Start: 2022-03-31 | End: 2022-04-06

## 2022-04-05 ENCOUNTER — TELEPHONE (OUTPATIENT)
Dept: OBSTETRICS AND GYNECOLOGY | Age: 32
End: 2022-04-05

## 2022-04-06 RX ORDER — MULTIVITAMIN WITH IRON
100 TABLET ORAL DAILY
Qty: 30 TABLET | Refills: 1 | Status: SHIPPED | OUTPATIENT
Start: 2022-04-06 | End: 2022-11-12

## 2022-04-11 ENCOUNTER — OFFICE VISIT (OUTPATIENT)
Dept: OBSTETRICS AND GYNECOLOGY | Age: 32
End: 2022-04-11

## 2022-04-11 VITALS
WEIGHT: 125 LBS | BODY MASS INDEX: 23 KG/M2 | DIASTOLIC BLOOD PRESSURE: 70 MMHG | HEIGHT: 62 IN | SYSTOLIC BLOOD PRESSURE: 120 MMHG

## 2022-04-11 DIAGNOSIS — O36.80X0 ENCOUNTER TO DETERMINE FETAL VIABILITY OF PREGNANCY, SINGLE OR UNSPECIFIED FETUS: Primary | ICD-10-CM

## 2022-04-11 DIAGNOSIS — Z87.59 HISTORY OF PLACENTAL ABRUPTION: ICD-10-CM

## 2022-04-11 DIAGNOSIS — Z15.89 HETEROZYGOUS MTHFR MUTATION C677T: ICD-10-CM

## 2022-04-11 DIAGNOSIS — Z98.891 HISTORY OF 2 CESAREAN SECTIONS: ICD-10-CM

## 2022-04-11 DIAGNOSIS — Z82.79 FAMILY HISTORY OF CONGENITAL HEART DEFECT: ICD-10-CM

## 2022-04-11 DIAGNOSIS — Z13.89 SCREENING FOR BLOOD OR PROTEIN IN URINE: ICD-10-CM

## 2022-04-11 DIAGNOSIS — Z32.00 VISIT FOR CONFIRMATION OF PREGNANCY TEST RESULT WITH PHYSICAL EXAM: ICD-10-CM

## 2022-04-11 LAB
BILIRUB BLD-MCNC: NEGATIVE MG/DL
GLUCOSE UR STRIP-MCNC: NEGATIVE MG/DL
KETONES UR QL: NEGATIVE
LEUKOCYTE EST, POC: NEGATIVE
NITRITE UR-MCNC: NEGATIVE MG/ML
PH UR: 6 [PH] (ref 5–8)
PROT UR STRIP-MCNC: NEGATIVE MG/DL
RBC # UR STRIP: NEGATIVE /UL
SP GR UR: 1.02 (ref 1–1.03)
UROBILINOGEN UR QL: NORMAL

## 2022-04-11 PROCEDURE — 76817 TRANSVAGINAL US OBSTETRIC: CPT | Performed by: OBSTETRICS & GYNECOLOGY

## 2022-04-11 PROCEDURE — 0501F PRENATAL FLOW SHEET: CPT | Performed by: OBSTETRICS & GYNECOLOGY

## 2022-04-11 RX ORDER — ASPIRIN 81 MG/1
81 TABLET ORAL DAILY
Qty: 90 TABLET | Refills: 3 | Status: SHIPPED | OUTPATIENT
Start: 2022-04-11 | End: 2022-11-12 | Stop reason: HOSPADM

## 2022-04-11 NOTE — PROGRESS NOTES
"Subjective   Daphne Roberts is a 32 y.o. female Cc; pregnancy conf with us LMP 22 , nausea denies bleeding or cramping - h/o molar pregnancy- declines michelle        History of Present Illness    The following portions of the patient's history were reviewed and updated as appropriate: allergies, current medications, past family history, past medical history, past social history, past surgical history and problem list.    Review of Systems   Constitutional: Negative for chills and fever.   Gastrointestinal: Positive for nausea. Negative for abdominal pain and vomiting.   Genitourinary: Negative for dysuria, pelvic pain, vaginal bleeding, vaginal discharge and vaginal pain.   All other systems reviewed and are negative.    /70   Ht 157.5 cm (62\")   Wt 56.7 kg (125 lb)   LMP 2022 (Exact Date)   Breastfeeding No   BMI 22.86 kg/m²     Objective   Physical Exam  Vitals and nursing note reviewed.   Constitutional:       Appearance: Normal appearance. She is normal weight.   Pulmonary:      Effort: Pulmonary effort is normal.   Neurological:      Mental Status: She is alert and oriented to person, place, and time.   Psychiatric:         Mood and Affect: Mood normal.         Behavior: Behavior normal.           Assessment/Plan   Diagnoses and all orders for this visit:    1. Encounter to determine fetal viability of pregnancy, single or unspecified fetus (Primary)  -     US Ob Transvaginal  -     POC Urinalysis Dipstick    2. Screening for blood or protein in urine  -     POC Urinalysis Dipstick    3. Family history of congenital heart defect    4. Heterozygous MTHFR mutation C677T  -     aspirin (aspirin) 81 MG EC tablet; Take 1 tablet by mouth Daily.  Dispense: 90 tablet; Refill: 3    5. History of 2  sections    6. History of placental abruption    7. Visit for confirmation of pregnancy test result with physical exam  -     Varicella Zoster Antibody, IgG  -     Urine Culture - Urine, " Urine, Clean Catch  -     Rubella Antibody, IgG  -     RPR  -     HIV-1 / O / 2 Ag / Antibody 4th Generation  -     Hepatitis C Antibody  -     Hepatitis B Surface Antigen  -     Hemoglobinopathy Fractionation Cascade  -     CBC & Differential  -     Antibody Screen  -     ABO / Rh  -     Chlamydia trachomatis, Neisseria gonorrhoeae, Trichomonas vaginalis, PCR - Urine, Urine, Clean Catch        Counseling was given to patient for the following topics: diagnostic results, instructions for management, impressions, risks and benefits of treatment options and importance of treatment compliance . Total time of the encounter was 30 minutes and 25 minutes was spent counseling.  Return in about 3 weeks (around 5/2/2022), or ob intake.

## 2022-04-13 LAB
ABO GROUP BLD: NORMAL
BACTERIA UR CULT: NO GROWTH
BACTERIA UR CULT: NORMAL
BASOPHILS # BLD AUTO: 0 X10E3/UL (ref 0–0.2)
BASOPHILS NFR BLD AUTO: 0 %
BLD GP AB SCN SERPL QL: NEGATIVE
C TRACH RRNA SPEC QL NAA+PROBE: NEGATIVE
EOSINOPHIL # BLD AUTO: 0.1 X10E3/UL (ref 0–0.4)
EOSINOPHIL NFR BLD AUTO: 1 %
ERYTHROCYTE [DISTWIDTH] IN BLOOD BY AUTOMATED COUNT: 14.5 % (ref 11.7–15.4)
HBV SURFACE AG SERPL QL IA: NEGATIVE
HCT VFR BLD AUTO: 35.7 % (ref 34–46.6)
HCV AB S/CO SERPL IA: 0.1 S/CO RATIO (ref 0–0.9)
HGB A MFR BLD ELPH: 97.1 % (ref 96.4–98.8)
HGB A2 MFR BLD ELPH: 2.9 % (ref 1.8–3.2)
HGB BLD-MCNC: 12.1 G/DL (ref 11.1–15.9)
HGB F MFR BLD ELPH: 0 % (ref 0–2)
HGB FRACT BLD-IMP: NORMAL
HGB S MFR BLD ELPH: 0 %
HIV 1+2 AB+HIV1 P24 AG SERPL QL IA: NON REACTIVE
IMM GRANULOCYTES # BLD AUTO: 0 X10E3/UL (ref 0–0.1)
IMM GRANULOCYTES NFR BLD AUTO: 0 %
LYMPHOCYTES # BLD AUTO: 1.1 X10E3/UL (ref 0.7–3.1)
LYMPHOCYTES NFR BLD AUTO: 14 %
MCH RBC QN AUTO: 28.3 PG (ref 26.6–33)
MCHC RBC AUTO-ENTMCNC: 33.9 G/DL (ref 31.5–35.7)
MCV RBC AUTO: 84 FL (ref 79–97)
MONOCYTES # BLD AUTO: 0.5 X10E3/UL (ref 0.1–0.9)
MONOCYTES NFR BLD AUTO: 7 %
N GONORRHOEA RRNA SPEC QL NAA+PROBE: NEGATIVE
NEUTROPHILS # BLD AUTO: 6.1 X10E3/UL (ref 1.4–7)
NEUTROPHILS NFR BLD AUTO: 78 %
PLATELET # BLD AUTO: 258 X10E3/UL (ref 150–450)
RBC # BLD AUTO: 4.27 X10E6/UL (ref 3.77–5.28)
RH BLD: POSITIVE
RPR SER QL: NON REACTIVE
RUBV IGG SERPL IA-ACNC: 1.04 INDEX
T VAGINALIS RRNA SPEC QL NAA+PROBE: NEGATIVE
VZV IGG SER IA-ACNC: 1069 INDEX
WBC # BLD AUTO: 7.9 X10E3/UL (ref 3.4–10.8)

## 2022-05-02 ENCOUNTER — INITIAL PRENATAL (OUTPATIENT)
Dept: OBSTETRICS AND GYNECOLOGY | Age: 32
End: 2022-05-02

## 2022-05-02 VITALS — DIASTOLIC BLOOD PRESSURE: 64 MMHG | BODY MASS INDEX: 22.5 KG/M2 | WEIGHT: 123 LBS | SYSTOLIC BLOOD PRESSURE: 112 MMHG

## 2022-05-02 DIAGNOSIS — O21.9 NAUSEA AND VOMITING IN PREGNANCY: ICD-10-CM

## 2022-05-02 DIAGNOSIS — Z15.89 HETEROZYGOUS MTHFR MUTATION C677T: ICD-10-CM

## 2022-05-02 DIAGNOSIS — Z98.891 HISTORY OF 2 CESAREAN SECTIONS: ICD-10-CM

## 2022-05-02 DIAGNOSIS — Z82.79 FAMILY HISTORY OF CONGENITAL HEART DEFECT: ICD-10-CM

## 2022-05-02 DIAGNOSIS — Z34.81 PRENATAL CARE, SUBSEQUENT PREGNANCY IN FIRST TRIMESTER: Primary | ICD-10-CM

## 2022-05-02 DIAGNOSIS — Z13.89 SCREENING FOR BLOOD OR PROTEIN IN URINE: ICD-10-CM

## 2022-05-02 DIAGNOSIS — Z87.59 HISTORY OF PLACENTAL ABRUPTION: ICD-10-CM

## 2022-05-02 LAB
BILIRUB BLD-MCNC: NEGATIVE MG/DL
CLARITY, POC: CLEAR
COLOR UR: YELLOW
GLUCOSE UR STRIP-MCNC: NEGATIVE MG/DL
KETONES UR QL: NEGATIVE
LEUKOCYTE EST, POC: NEGATIVE
NITRITE UR-MCNC: NEGATIVE MG/ML
PH UR: 5.5 [PH] (ref 5–8)
PROT UR STRIP-MCNC: NEGATIVE MG/DL
RBC # UR STRIP: NEGATIVE /UL
SP GR UR: 1 (ref 1–1.03)
UROBILINOGEN UR QL: NORMAL

## 2022-05-02 PROCEDURE — 0501F PRENATAL FLOW SHEET: CPT | Performed by: OBSTETRICS & GYNECOLOGY

## 2022-05-02 RX ORDER — ONDANSETRON 4 MG/1
4 TABLET, FILM COATED ORAL EVERY 6 HOURS PRN
Qty: 30 TABLET | Refills: 1 | Status: SHIPPED | OUTPATIENT
Start: 2022-05-02 | End: 2022-11-12

## 2022-05-02 RX ORDER — PRENATAL VIT NO.126/IRON/FOLIC 28MG-0.8MG
TABLET ORAL DAILY
COMMUNITY

## 2022-05-02 NOTE — PROGRESS NOTES
Chief Complaint   Patient presents with   • Routine Prenatal Visit     New OB intake       HPI: 32 y.o.  at 10w3d presents for ob intake     Vitals:    22 0859   BP: 112/64   Weight: 55.8 kg (123 lb)     Total weight gain for pregnancy:  Not found.    Review of systems:     Gen: negative  CV:     negative  GI: nausea  :   negative  MS:    negative  Neuro: negative  Pul: negative      A/P  1. Intrauterine pregnancy at 10w3d   2. Pregnancy Risk:  HIGH RISK        Diagnoses and all orders for this visit:    1. Prenatal care, subsequent pregnancy in first trimester (Primary)    2. Screening for blood or protein in urine  -     POC Urinalysis Dipstick    3. Heterozygous MTHFR mutation C677T    4. Family history of congenital heart defect    5. History of placental abruption    6. History of 2  sections    7. Nausea and vomiting in pregnancy  -     ondansetron (Zofran) 4 MG tablet; Take 1 tablet by mouth Every 6 (Six) Hours As Needed for Nausea or Vomiting.  Dispense: 30 tablet; Refill: 1        Nutrition and weight gain were addressed.  Practice OB call structure was discussed.       -----------------------  PLAN:   Return in about 3 weeks (around 2022), or ob check with me.  Declines NIPT   OB intake done   Prenatal labs reviewed   Nausea - Zofran Rx       Delores Le MD  2022 12:47 EDT

## 2022-05-23 ENCOUNTER — ROUTINE PRENATAL (OUTPATIENT)
Dept: OBSTETRICS AND GYNECOLOGY | Age: 32
End: 2022-05-23

## 2022-05-23 VITALS — DIASTOLIC BLOOD PRESSURE: 70 MMHG | WEIGHT: 124 LBS | BODY MASS INDEX: 22.68 KG/M2 | SYSTOLIC BLOOD PRESSURE: 108 MMHG

## 2022-05-23 DIAGNOSIS — Z34.81 PRENATAL CARE, SUBSEQUENT PREGNANCY IN FIRST TRIMESTER: Primary | ICD-10-CM

## 2022-05-23 DIAGNOSIS — Z15.89 HETEROZYGOUS MTHFR MUTATION C677T: ICD-10-CM

## 2022-05-23 DIAGNOSIS — Z87.59 HISTORY OF PLACENTAL ABRUPTION: ICD-10-CM

## 2022-05-23 DIAGNOSIS — Z13.89 SCREENING FOR BLOOD OR PROTEIN IN URINE: ICD-10-CM

## 2022-05-23 DIAGNOSIS — Z98.891 HISTORY OF 2 CESAREAN SECTIONS: ICD-10-CM

## 2022-05-23 LAB
BILIRUB BLD-MCNC: NEGATIVE MG/DL
GLUCOSE UR STRIP-MCNC: NEGATIVE MG/DL
KETONES UR QL: NEGATIVE
LEUKOCYTE EST, POC: NEGATIVE
NITRITE UR-MCNC: NEGATIVE MG/ML
PH UR: 7 [PH] (ref 5–8)
PROT UR STRIP-MCNC: NEGATIVE MG/DL
RBC # UR STRIP: NEGATIVE /UL
SP GR UR: 1.01 (ref 1–1.03)
UROBILINOGEN UR QL: NORMAL

## 2022-05-23 PROCEDURE — 0502F SUBSEQUENT PRENATAL CARE: CPT | Performed by: OBSTETRICS & GYNECOLOGY

## 2022-06-20 ENCOUNTER — ROUTINE PRENATAL (OUTPATIENT)
Dept: OBSTETRICS AND GYNECOLOGY | Age: 32
End: 2022-06-20

## 2022-06-20 VITALS — SYSTOLIC BLOOD PRESSURE: 100 MMHG | WEIGHT: 126 LBS | DIASTOLIC BLOOD PRESSURE: 62 MMHG | BODY MASS INDEX: 23.05 KG/M2

## 2022-06-20 DIAGNOSIS — Z13.89 SCREENING FOR BLOOD OR PROTEIN IN URINE: ICD-10-CM

## 2022-06-20 DIAGNOSIS — Z87.59 HISTORY OF PLACENTAL ABRUPTION: ICD-10-CM

## 2022-06-20 DIAGNOSIS — Z15.89 HETEROZYGOUS MTHFR MUTATION C677T: ICD-10-CM

## 2022-06-20 DIAGNOSIS — Z82.79 FAMILY HISTORY OF CONGENITAL HEART DEFECT: ICD-10-CM

## 2022-06-20 DIAGNOSIS — Z98.891 HISTORY OF 2 CESAREAN SECTIONS: ICD-10-CM

## 2022-06-20 DIAGNOSIS — Z34.82 PRENATAL CARE, SUBSEQUENT PREGNANCY IN SECOND TRIMESTER: Primary | ICD-10-CM

## 2022-06-20 DIAGNOSIS — N80.6 ENDOMETRIOSIS IN SCAR OF SKIN: ICD-10-CM

## 2022-06-20 DIAGNOSIS — O21.9 NAUSEA AND VOMITING IN PREGNANCY: ICD-10-CM

## 2022-06-20 LAB
BILIRUB BLD-MCNC: NEGATIVE MG/DL
GLUCOSE UR STRIP-MCNC: NEGATIVE MG/DL
KETONES UR QL: NEGATIVE
LEUKOCYTE EST, POC: NEGATIVE
NITRITE UR-MCNC: NEGATIVE MG/ML
PH UR: 6 [PH] (ref 5–8)
PROT UR STRIP-MCNC: NEGATIVE MG/DL
RBC # UR STRIP: NEGATIVE /UL
SP GR UR: 1.01 (ref 1–1.03)
UROBILINOGEN UR QL: NORMAL

## 2022-06-20 PROCEDURE — 0502F SUBSEQUENT PRENATAL CARE: CPT | Performed by: OBSTETRICS & GYNECOLOGY

## 2022-06-20 NOTE — PROGRESS NOTES
Patient reports back pain -exercises given   Declines PT   Declines AFP   SAB warnings  3 weeks with anatomy uS

## 2022-07-11 ENCOUNTER — ROUTINE PRENATAL (OUTPATIENT)
Dept: OBSTETRICS AND GYNECOLOGY | Age: 32
End: 2022-07-11

## 2022-07-11 VITALS — SYSTOLIC BLOOD PRESSURE: 118 MMHG | DIASTOLIC BLOOD PRESSURE: 68 MMHG | WEIGHT: 128 LBS | BODY MASS INDEX: 23.41 KG/M2

## 2022-07-11 DIAGNOSIS — Z13.89 SCREENING FOR BLOOD OR PROTEIN IN URINE: ICD-10-CM

## 2022-07-11 DIAGNOSIS — Z34.82 PRENATAL CARE, SUBSEQUENT PREGNANCY IN SECOND TRIMESTER: Primary | ICD-10-CM

## 2022-07-11 DIAGNOSIS — Z87.59 HISTORY OF PLACENTAL ABRUPTION: ICD-10-CM

## 2022-07-11 DIAGNOSIS — O01.1 PARTIAL HYDATIDIFORM MOLE: ICD-10-CM

## 2022-07-11 DIAGNOSIS — O28.3 ABNORMAL FETAL ULTRASOUND: ICD-10-CM

## 2022-07-11 DIAGNOSIS — Z98.891 HISTORY OF 2 CESAREAN SECTIONS: ICD-10-CM

## 2022-07-11 LAB
BILIRUB BLD-MCNC: NEGATIVE MG/DL
GLUCOSE UR STRIP-MCNC: NEGATIVE MG/DL
KETONES UR QL: NEGATIVE
LEUKOCYTE EST, POC: NEGATIVE
NITRITE UR-MCNC: NEGATIVE MG/ML
PH UR: 6.5 [PH] (ref 5–8)
PROT UR STRIP-MCNC: NEGATIVE MG/DL
RBC # UR STRIP: ABNORMAL /UL
SP GR UR: 1.01 (ref 1–1.03)
UROBILINOGEN UR QL: NORMAL

## 2022-07-11 PROCEDURE — 0502F SUBSEQUENT PRENATAL CARE: CPT | Performed by: OBSTETRICS & GYNECOLOGY

## 2022-07-11 NOTE — PROGRESS NOTES
Patient reports good fetal movement   Normal but incomplete anatomy today - repeat 4 weeks   Normal CL   4 weeks

## 2022-08-08 ENCOUNTER — ROUTINE PRENATAL (OUTPATIENT)
Dept: OBSTETRICS AND GYNECOLOGY | Age: 32
End: 2022-08-08

## 2022-08-08 VITALS — DIASTOLIC BLOOD PRESSURE: 68 MMHG | BODY MASS INDEX: 23.23 KG/M2 | WEIGHT: 127 LBS | SYSTOLIC BLOOD PRESSURE: 120 MMHG

## 2022-08-08 DIAGNOSIS — O28.3 ABNORMAL FETAL ULTRASOUND: ICD-10-CM

## 2022-08-08 DIAGNOSIS — Z15.89 HETEROZYGOUS MTHFR MUTATION C677T: ICD-10-CM

## 2022-08-08 DIAGNOSIS — Z13.89 SCREENING FOR BLOOD OR PROTEIN IN URINE: ICD-10-CM

## 2022-08-08 DIAGNOSIS — Z82.79 FAMILY HISTORY OF CONGENITAL HEART DEFECT: ICD-10-CM

## 2022-08-08 DIAGNOSIS — Z98.891 HISTORY OF 2 CESAREAN SECTIONS: ICD-10-CM

## 2022-08-08 DIAGNOSIS — Z34.82 PRENATAL CARE, SUBSEQUENT PREGNANCY IN SECOND TRIMESTER: Primary | ICD-10-CM

## 2022-08-08 DIAGNOSIS — Z87.59 HISTORY OF PLACENTAL ABRUPTION: ICD-10-CM

## 2022-08-08 LAB
BILIRUB BLD-MCNC: NEGATIVE MG/DL
GLUCOSE UR STRIP-MCNC: NEGATIVE MG/DL
KETONES UR QL: NEGATIVE
LEUKOCYTE EST, POC: NEGATIVE
NITRITE UR-MCNC: NEGATIVE MG/ML
PH UR: 6 [PH] (ref 5–8)
PROT UR STRIP-MCNC: NEGATIVE MG/DL
RBC # UR STRIP: ABNORMAL /UL
SP GR UR: 1.01 (ref 1–1.03)
UROBILINOGEN UR QL: NORMAL

## 2022-08-08 PROCEDURE — 0502F SUBSEQUENT PRENATAL CARE: CPT | Performed by: OBSTETRICS & GYNECOLOGY

## 2022-08-08 NOTE — PROGRESS NOTES
Patient denies complaints  Normal completed anatomy today   H/O abruption - to [facility] for timing of delivery    Second child with bradycardia - needs fetal echo   PTL warnings

## 2022-08-15 ENCOUNTER — OFFICE VISIT (OUTPATIENT)
Dept: OBSTETRICS AND GYNECOLOGY | Facility: CLINIC | Age: 32
End: 2022-08-15

## 2022-08-15 ENCOUNTER — HOSPITAL ENCOUNTER (OUTPATIENT)
Dept: ULTRASOUND IMAGING | Facility: HOSPITAL | Age: 32
Discharge: HOME | End: 2022-08-15
Admitting: OBSTETRICS & GYNECOLOGY

## 2022-08-15 VITALS
HEART RATE: 81 BPM | TEMPERATURE: 98.4 F | BODY MASS INDEX: 24.14 KG/M2 | WEIGHT: 132 LBS | SYSTOLIC BLOOD PRESSURE: 121 MMHG | DIASTOLIC BLOOD PRESSURE: 65 MMHG

## 2022-08-15 DIAGNOSIS — Z98.891 HISTORY OF 2 CESAREAN SECTIONS: ICD-10-CM

## 2022-08-15 DIAGNOSIS — I34.0 MITRAL VALVE INSUFFICIENCY, UNSPECIFIED ETIOLOGY: Primary | ICD-10-CM

## 2022-08-15 DIAGNOSIS — Z82.79 FAMILY HISTORY OF CONGENITAL HEART DEFECT: ICD-10-CM

## 2022-08-15 DIAGNOSIS — O99.419 MITRAL REGURGITATION OF MOTHER DURING PREGNANCY: ICD-10-CM

## 2022-08-15 DIAGNOSIS — Z3A.25 25 WEEKS GESTATION OF PREGNANCY: ICD-10-CM

## 2022-08-15 DIAGNOSIS — Z87.59 HISTORY OF PLACENTAL ABRUPTION: ICD-10-CM

## 2022-08-15 DIAGNOSIS — Z87.59 HISTORY OF PLACENTAL ABRUPTION: Primary | ICD-10-CM

## 2022-08-15 DIAGNOSIS — I34.0 MITRAL REGURGITATION OF MOTHER DURING PREGNANCY: ICD-10-CM

## 2022-08-15 PROCEDURE — 99215 OFFICE O/P EST HI 40 MIN: CPT | Performed by: OBSTETRICS & GYNECOLOGY

## 2022-08-15 PROCEDURE — 76811 OB US DETAILED SNGL FETUS: CPT | Performed by: OBSTETRICS & GYNECOLOGY

## 2022-08-15 PROCEDURE — 76811 OB US DETAILED SNGL FETUS: CPT

## 2022-08-15 NOTE — PROGRESS NOTES
[facility] Consult     Dear Dr. FLORES  Thank-you for referring Daphne Roberts for a Maternal Fetal Medicine consult for history of placental abruption, strong family history of cardiac abnormalities, and history of mitral regurgitation.    Her first delivery she delivered at 36 weeks for placental abruption and her second delivery was a repeat  at 31-4/7 weeks for fetal bradycardia.  Patient states that she went in for a normal visit and on ultrasound was noted that the fetus was in the 70s.  Apparently in the  period the heart rate recovered very quickly and the  echo was within normal limits    Patient's family history is significant for a brother with complete heart block but  at the age of 28 she is also had a sister with mitral valve prolapse that  at age 24 but from pneumonia.  Additionally she has a brother with sinus bradycardia and a nephew with aortic stenosis    This patient has been diagnosed with mitral regurgitation and the patient's mother states that she has been told that he gets worse during pregnancy.  My understanding is that she has not seen a cardiologist in some time and most of her care was with a pediatric cardiologist.    Presently, patient denies nausea or vomiting. Denies abdominal pain/cramping/tenderness/contractions. Denies LOF and reports good fetal movement.      OB History    Para Term  AB Living   5 2 0 2 2 2   SAB IAB Ectopic Molar Multiple Live Births   2 0 0 0 0 2      # Outcome Date GA Lbr Vishal/2nd Weight Sex Delivery Anes PTL Lv   5 Current            4 2021 12w0d          3  10/25/17 31w4d  1543 g (3 lb 6.4 oz) F CS-LTranv Gen Y AREN      Birth Comments: Fetal heart tones - 70'-80's - emergent     2 SAB            1   36w0d  3147 g (6 lb 15 oz) M CS-LTranv   AREN      Complications: Placental abruption, Couvelaire uterus      Obstetric Comments   22- IUP at 7-3 weeks - DENISE 22 - [facility]     22 - 20-3 weeks - Normal but Incomplete anatomy - CL - 4.9 cm - F     22 - 24-2 weeks - Normal completed anatomy - F      The remainder of her pregnancy has been uncomplicated.   has a past medical history of Anemia, Asthma, H/O mitral valve prolapse, MTHFR mutation, Placental abruption (), and Shortened VT interval.   has a past surgical history that includes  section (); Dilation and curettage of uterus (2017);  section (N/A, 10/25/2017); d & c with suction (N/A, 2021); and Excision Mass Trunk (N/A, 2021).  No Known Allergies    Current Outpatient Medications:   •  aspirin (aspirin) 81 MG EC tablet, Take 1 tablet by mouth Daily., Disp: 90 tablet, Rfl: 3  •  doxylamine (UNISOM) 25 MG tablet, Take 1 tablet by mouth At Night As Needed for Sleep., Disp: 30 tablet, Rfl: 1  •  prenatal vitamin (prenatal, CLASSIC, vitamin) tablet, Take  by mouth Daily., Disp: , Rfl:   •  vitamin B-6 (PYRIDOXINE) 100 MG tablet, Take 1 tablet by mouth Daily., Disp: 30 tablet, Rfl: 1  •  ondansetron (Zofran) 4 MG tablet, Take 1 tablet by mouth Every 6 (Six) Hours As Needed for Nausea or Vomiting., Disp: 30 tablet, Rfl: 1  family history is not on file.  Social History     Tobacco Use   • Smoking status: Never Smoker   • Smokeless tobacco: Never Used   Vaping Use   • Vaping Use: Never used   Substance Use Topics   • Alcohol use: Not Currently     Comment: 4 yearly   • Drug use: Never       PHYSICAL EXAM:  /65 (BP Location: Right arm, Patient Position: Sitting)   Pulse 81   Temp 98.4 °F (36.9 °C) (Temporal)   Wt 59.9 kg (132 lb)   LMP 2022 (Exact Date)   BMI 24.14 kg/m²   General: pleasant, alert and oriented  Abdomen: soft, non tender, gravid  Extremites: bilat lower extremities soft, non tender, no edema noted    Ultrasound  Ultrasound evaluation reveals a fetus measuring 866g at the 56%.  No fetal abnormalities were noted today.  Initial targeted cardiac heart were wnl.   BPP 8/8, with normal fluid.       ASSESSMENT:  Diagnoses and all orders for this visit:    1. History of placental abruption (Primary)  Overview:  First Pregnancy       2. History of 2  sections    3. Family history of congenital heart defect  Overview:  Brother  from complete heart block  Pt labs:   - SSA/SSB, - anticardiolipin   Fetal ECHO wnl - 10/4/17    ECHO recommended      4. Mitral regurgitation of mother during pregnancy  Overview:  Mild to mod mitral regurg on 17 visit to [facility] ECHO and EKG 4 months EF - [facility]          Recommendations:  1.  History of placental abruption and this is at 36 weeks.  I would continue her baby aspirin.  Risks of abruption are approximately 10% in subsequent pregnancies.  Review of her notes and records reveals that she had antiphospholipid antibodies and SSA and SSB antibodies in 2017 that were negative.  She specifically had anticardiolipin IgG and IgM, both negative.    2.  History of previous C-sections x2.  Recommend a repeat  this pregnancy    3.  Significant family history of cardiac abnormalities.  Scheduling patient for a fetal echo which has been scheduled on .    4.  Mitral regurgitation.  I spoke with Dr. Le and she will be scheduling this patient for cardiology consult and a maternal echo.  I appreciate your help in getting this patient scheduled.  Additionally I would recommend another echo in the third trimester as she states that sometimes her mitral regurgitation gets worse later in gestation     Thank-you for referring for a maternal fetal medicine consult. If we can be of any further assistance to you please do not hesitate to contact us.    Total consult time 45 minutes with greater than 50% of the time face to face.

## 2022-08-15 NOTE — PROGRESS NOTES
Pt reports that she is doing well and denies vaginal bleeding, cramping, contractions or LOF at this time. Reports frequent abdominal tightness, denies at today's appointment. Reports active fetal movement. Denies HA, visual changes or epigastric pain. Denies any additional complaints at time of appointment. Next OB appointment scheduled for 8/29.    Vitals:    08/15/22 1311   BP: 121/65   Pulse: 81   Temp: 98.4 °F (36.9 °C)

## 2022-08-17 ENCOUNTER — TRANSCRIBE ORDERS (OUTPATIENT)
Dept: ULTRASOUND IMAGING | Facility: HOSPITAL | Age: 32
End: 2022-08-17

## 2022-08-17 DIAGNOSIS — Z87.59 HISTORY OF PLACENTAL ABRUPTION: ICD-10-CM

## 2022-08-17 DIAGNOSIS — Z82.79 FAMILY HISTORY OF CONGENITAL HEART DEFECT: Primary | ICD-10-CM

## 2022-08-17 NOTE — PROGRESS NOTES
RM:________    Referral Provider: Delores Le MD Jones, Ashlee Rush, APRN    NEW PATIENT/ CONSULT  PREVIOUS CARDIOLOGIST:   CARDIAC TESTING:     : 1990   AGE: 32 y.o.    2022  REASON FOR VISIT/  CC:      WT: ____________ BP: __________L __________R/ HR_______________    CHEST PAIN: _____________    SOA: ____________PALPS: __________      LIGHTHEADED: ___________ FATIGUE: _______________ EDEMA______________  ALLERGIES:  Patient has no known allergies.  SMOKING HISTORY  Social History     Tobacco Use   • Smoking status: Never Smoker   • Smokeless tobacco: Never Used   Vaping Use   • Vaping Use: Never used   Substance Use Topics   • Alcohol use: Not Currently     Comment: 4 yearly   • Drug use: Never          CHILDREN: _______________________       CAFFEINE USE________  ALCOHOL _____________  OCCUPATION_____________  Past Surgical History:   Procedure Laterality Date   •  SECTION     •  SECTION N/A 10/25/2017    Procedure:  SECTION REPEAT;  Surgeon: Delores Le MD;  Location: [facility] LABOR DELIVERY;  Service:    • D & C WITH SUCTION N/A 2021    Procedure: DILATATION AND CURETTAGE WITH SUCTION 10 WEEKS GESTATION;  Surgeon: Delores Le MD;  Location: [facility] OR;  Service: Obstetrics/Gynecology;  Laterality: N/A;  x2   • DILATATION AND CURETTAGE  2017    MAB   • EXCISION MASS TRUNK N/A 2021    Procedure: EXCISION MASS TRUNK;  Surgeon: Delores Le MD;  Location: [facility] OR;  Service: Obstetrics/Gynecology;  Laterality: N/A;                FAMILY HISTORY  HEART DISEASE  CHF  DIABETES  CARDIAC ARREST  STROKE  CANCER  ANEURYSM                                                             H/O: MI_____   STROKE________   GOUT_____   ANEMIA______     CAROTID________ HIV____ CAD_______ HYPERCHOL _____    H/O: CHF _____   RF____ DM___ HTN_______PVD____THYROID DISEASE_______    PMH: GI ____   HEPATITIS ___ KIDNEY DISEASE ___ LUNG  DISEASE _______     SLEEP APNEA ____ BLOOD CLOTS ____ DVT ____ VEIN STRIPPING ___________     CANCER _________________________________ CHEMO/ RADIATION__________

## 2022-08-22 ENCOUNTER — TRANSCRIBE ORDERS (OUTPATIENT)
Dept: ULTRASOUND IMAGING | Facility: HOSPITAL | Age: 32
End: 2022-08-22

## 2022-08-22 ENCOUNTER — OFFICE VISIT (OUTPATIENT)
Dept: CARDIOLOGY | Facility: CLINIC | Age: 32
End: 2022-08-22

## 2022-08-22 VITALS
BODY MASS INDEX: 24.4 KG/M2 | HEIGHT: 62 IN | WEIGHT: 132.6 LBS | SYSTOLIC BLOOD PRESSURE: 104 MMHG | DIASTOLIC BLOOD PRESSURE: 82 MMHG | HEART RATE: 72 BPM

## 2022-08-22 DIAGNOSIS — Z87.74 PERSONAL HISTORY OF VENTRICULAR SEPTAL DEFECT: ICD-10-CM

## 2022-08-22 DIAGNOSIS — Z3A.26 26 WEEKS GESTATION OF PREGNANCY: Primary | ICD-10-CM

## 2022-08-22 DIAGNOSIS — I34.0 MITRAL REGURGITATION OF MOTHER DURING PREGNANCY: ICD-10-CM

## 2022-08-22 DIAGNOSIS — O99.419 MITRAL REGURGITATION OF MOTHER DURING PREGNANCY: ICD-10-CM

## 2022-08-22 DIAGNOSIS — R94.31 SHORTENED PR INTERVAL: ICD-10-CM

## 2022-08-22 DIAGNOSIS — Z82.79 FAMILY HISTORY OF CONGENITAL HEART DEFECT: ICD-10-CM

## 2022-08-22 DIAGNOSIS — Z82.79 FAMILY HISTORY OF CONGENITAL HEART DEFECT: Primary | ICD-10-CM

## 2022-08-22 PROCEDURE — 93000 ELECTROCARDIOGRAM COMPLETE: CPT | Performed by: INTERNAL MEDICINE

## 2022-08-22 PROCEDURE — 99204 OFFICE O/P NEW MOD 45 MIN: CPT | Performed by: INTERNAL MEDICINE

## 2022-08-22 NOTE — PROGRESS NOTES
"Date of Office Visit: 22  Encounter Provider: Harman Toure MD  Place of Service: [facility] CARDIOLOGY  Patient Name: Daphne Roberts  :1990    Chief Complaint   Patient presents with   • Cardiac Valve Problem   :     HPI:     Ms. Travis Roberts is 32 y.o. and presents today in consultation for congenital heart disease at the request of Zack Le and James.    She has a history of a VSD at birth that closed spontaneously at age 6. She has a short DC interval but no diagnosis of a delta wave/WPW/SVT. She was told she had \"mild mitral valve regurgitation that gets worse during pregnancy.\" She was followed by a pediatric cardiologist until 2017; he has retired.    She has had no issues becoming pregnant; her first pregnancy in  was complicated by spontaneous abruption. Labs revealed heterozygosity for MTHFR gene mutation. In 2017, at 31 weeks of pregnancy, the fetus' heart rate was reported to be 70bpm in the office and she was taken for an emergency delivery; the infant's heart rate was reportedly normal at birth but she did have some ups and downs during her first six weeks of life. She has a PFO.    Her sister has mitral valve prolapse. Her brother was diagnosed with complete heart block and had a pacemaker at age 4; he underwent several generator changes. He  suddenly at age 28 of unclear cause (no autopsy was performed).    As she is currently 26 weeks/2 days with her third pregnancy, cardiology consultation has been requested.    She feels well. She denies chest pain, dyspnea, syncope, edema, or palpitations.     Past Medical History:   Diagnosis Date   • Anemia    • Asthma    • H/O mitral valve prolapse     PER PATIENT. DX AGE 24. PT STATED \"THEY JUST MONITOR IT MAINLY WHEN I AM PREGNANT.\" Notes she has a short P wave   • Hx of MTHFR mutation     Single gene mutation with normal homocystine   • Placental abruption    • Shortened DC interval     PER " PATIENT   • VSD (ventricular septal defect)     congenital       Past Surgical History:   Procedure Laterality Date   •  SECTION     •  SECTION N/A 10/25/2017    Procedure:  SECTION REPEAT;  Surgeon: Delores Le MD;  Location: [facility] LABOR DELIVERY;  Service:    • D & C WITH SUCTION N/A 2021    Procedure: DILATATION AND CURETTAGE WITH SUCTION 10 WEEKS GESTATION;  Surgeon: Delores Le MD;  Location: [facility] OR;  Service: Obstetrics/Gynecology;  Laterality: N/A;  x2   • DILATATION AND CURETTAGE  2017    MAB   • EXCISION MASS TRUNK N/A 2021    Procedure: EXCISION MASS TRUNK;  Surgeon: Delores Le MD;  Location: [facility];  Service: Obstetrics/Gynecology;  Laterality: N/A;       Social History     Socioeconomic History   • Marital status:      Spouse name: MELODY LAWLER   • Number of children: 1   • Years of education: 12   Tobacco Use   • Smoking status: Never Smoker   • Smokeless tobacco: Never Used   Vaping Use   • Vaping Use: Never used   Substance and Sexual Activity   • Alcohol use: Not Currently     Comment: 4 yearly   • Drug use: Never   • Sexual activity: Not Currently     Partners: Male     Birth control/protection: None       Family History   Problem Relation Age of Onset   • Mitral valve prolapse Sister    • Pneumonia Sister          age 24   • Arrhythmia Brother         complete heart block/ pacemaker age 4/  age 28   • Bradycardia Brother    • Congenital heart disease Daughter         possible complete heart block/ resolved   • Aortic stenosis Nephew    • Breast cancer Neg Hx    • Ovarian cancer Neg Hx    • Uterine cancer Neg Hx    • Colon cancer Neg Hx    • Malig Hyperthermia Neg Hx        Review of Systems   Constitutional: Positive for weight gain.   Musculoskeletal: Positive for joint pain.   Gastrointestinal: Positive for nausea.   Neurological: Positive for excessive daytime sleepiness.   All other systems reviewed  "and are negative.      No Known Allergies      Current Outpatient Medications:   •  aspirin (aspirin) 81 MG EC tablet, Take 1 tablet by mouth Daily., Disp: 90 tablet, Rfl: 3  •  doxylamine (UNISOM) 25 MG tablet, Take 1 tablet by mouth At Night As Needed for Sleep., Disp: 30 tablet, Rfl: 1  •  prenatal vitamin (prenatal, CLASSIC, vitamin) tablet, Take  by mouth Daily., Disp: , Rfl:   •  vitamin B-6 (PYRIDOXINE) 100 MG tablet, Take 1 tablet by mouth Daily., Disp: 30 tablet, Rfl: 1  •  ondansetron (Zofran) 4 MG tablet, Take 1 tablet by mouth Every 6 (Six) Hours As Needed for Nausea or Vomiting., Disp: 30 tablet, Rfl: 1      Objective:     Vitals:    08/22/22 0853   BP: 104/82   BP Location: Right arm   Pulse: 72   Weight: 60.1 kg (132 lb 9.6 oz)   Height: 157.5 cm (62\")     Body mass index is 24.25 kg/m².    Vitals reviewed.   Constitutional:       Appearance: Well-developed.   Eyes:      Conjunctiva/sclera: Conjunctivae normal.      Pupils: Pupils are equal, round, and reactive to light.   HENT:      Head: Normocephalic.      Nose: Nose normal.   Neck:      Vascular: No JVD.      Lymphadenopathy: No cervical adenopathy.   Pulmonary:      Effort: Pulmonary effort is normal.      Breath sounds: Normal breath sounds.   Cardiovascular:      Normal rate. Regular rhythm.      Murmurs: There is no murmur.   Pulses:     Intact distal pulses.   Edema:     Peripheral edema absent.   Abdominal:      Palpations: Abdomen is soft. There is no abdominal mass.      Tenderness: There is no abdominal tenderness.      Comments: gravid   Musculoskeletal: Normal range of motion.      Cervical back: Normal range of motion. Skin:     General: Skin is warm and dry.      Findings: No rash.   Neurological:      Mental Status: Alert and oriented to person, place, and time.      Cranial Nerves: No cranial nerve deficit.   Psychiatric:         Behavior: Behavior normal.         Thought Content: Thought content normal.         Judgment: Judgment " normal.           ECG 12 Lead    Date/Time: 2022 9:11 AM  Performed by: Harman Toure MD  Authorized by: Harman Toure MD   Comparison: not compared with previous ECG   Rhythm: sinus rhythm  Conduction comments: Short NM interval  ST Segments: ST segments normal  T Waves: T waves normal  QRS axis: normal  Other: no other findings    Clinical impression: normal ECG            Assessment:       Diagnosis Plan   1. 26 weeks gestation of pregnancy  ECG 12 Lead    Adult Transthoracic Echo Complete W/ Cont if Necessary Per Protocol   2. Family history of congenital heart defect  ECG 12 Lead    Adult Transthoracic Echo Complete W/ Cont if Necessary Per Protocol   3. Mitral regurgitation of mother during pregnancy  ECG 12 Lead    Adult Transthoracic Echo Complete W/ Cont if Necessary Per Protocol   4. Shortened NM interval  Adult Transthoracic Echo Complete W/ Cont if Necessary Per Protocol   5. Personal history of ventricular septal defect  Adult Transthoracic Echo Complete W/ Cont if Necessary Per Protocol      Plan:       Ms Travis Roberts has a history of VSD at birth that closed, mild MR (I do not know if she has a structurally abnormal mitral valve), and a shortened NM interval (she does not have a delta wave, she . She does not have any readily apparent facial or musculoskeletal dysmorphism. Her daughter had fetal bradycardia and has a PFO. Her brother had childhood complete heart block and  suddenly at age 28 (no autopsy was performed). Her sister has MVP.      As she is presently 26 weeks pregnant, I will get an echocardiogram. I do think it would be beneficial for her to see a medical geneticist (possible Eldridge Radha or related disorders). I will check and see if [facility] is taking adult patients; if not, we can see if the Adult Congenital group at [facility] will evaluate her in the [place] office.    I do feel her thyroid function needs to be evaluated, as that can cause maternal and fetal  conduction disturbances; as I am not comfortable ordering TSH during pregnancy (as it can be falsely normal or abnormal), I ask that her primary OB or high risk OB facilitate this at the time of her next blood draw.    Sincerely,       Harman Toure MD

## 2022-08-23 ENCOUNTER — HOSPITAL ENCOUNTER (OUTPATIENT)
Dept: ULTRASOUND IMAGING | Facility: HOSPITAL | Age: 32
Discharge: HOME | End: 2022-08-23
Admitting: OBSTETRICS & GYNECOLOGY

## 2022-08-23 DIAGNOSIS — Z82.79 FAMILY HISTORY OF CONGENITAL HEART DEFECT: ICD-10-CM

## 2022-08-23 PROCEDURE — 76825 ECHO EXAM OF FETAL HEART: CPT

## 2022-08-23 PROCEDURE — 76827 ECHO EXAM OF FETAL HEART: CPT

## 2022-08-23 PROCEDURE — 93325 DOPPLER ECHO COLOR FLOW MAPG: CPT

## 2022-08-23 NOTE — PROGRESS NOTES
"Fetal Cardiology Outpatient Consult  Note    Patient Name:Daphne Roberts  :1990  Medical Record Number:[id number]  Date of Service:2022  Requesting Obstetrician: Dr. Green [facility]  Primary Obstetrician: Dr. Delores Le  Consult Location: [facility]    Reason for Visit:  Family History of congenital heart disease    History: I had the pleasure of seeing your patient, Daphne Roberts, today for a Fetal Cardiology Initial Consultation.  Fetal cardiology evaluation was requested due to Family History of congenital heart disease.      Daphne is a 32 y.o. woman who presents today at 26 weeks gestation, with a given due date of 2022.  This pregnancy was conceived naturally.  There has not been any genetic testing performed during pregnancy.    OB History        5    Para   2    Term   0       2    AB   2    Living   2       SAB   2    IAB   0    Ectopic   0    Molar   0    Multiple   0    Live Births   2          Obstetric Comments   22- IUP at 7-3 weeks - DENISE 22 - [facility]   22 - 20-3 weeks - Normal but Incomplete anatomy - CL - 4.9 cm - [facility]    22 - 24-2 weeks - Normal completed anatomy - [facility]   8/15/22 - 25-3 weeks - At [facility] -EFW 56%, AC 39% - [facility]               Past Medical History:  Past Medical History:   Diagnosis Date   • Anemia    • Asthma    • H/O mitral valve prolapse     PER PATIENT. DX AGE 24. PT STATED \"THEY JUST MONITOR IT MAINLY WHEN I AM PREGNANT.\" Notes she has a short P wave   • Hx of MTHFR mutation     Single gene mutation with normal homocystine   • Placental abruption    • Shortened AZ interval     PER PATIENT   • VSD (ventricular septal defect)     congenital     She has a history of Short AZ syndrome, mild mitral valve regurgitation, and asthma prior to pregnancy.   She has not had any documented arrhythmias that have required testing or medication or treatment.  She was found to have " mitral regurgitation during a previous pregnancy, it does not cause any problems between pregnancies, and it has only needed monitoring during pregnancy. She was followed for a VSD as a young child, was told initially that she may need surgery, but eventually the VSD closed on its own and she was cleared at age 5 yo.  She has remained healthy during pregnancy.       Current Medications:    Current Outpatient Medications:   •  aspirin (aspirin) 81 MG EC tablet, Take 1 tablet by mouth Daily., Disp: 90 tablet, Rfl: 3  •  doxylamine (UNISOM) 25 MG tablet, Take 1 tablet by mouth At Night As Needed for Sleep., Disp: 30 tablet, Rfl: 1  •  ondansetron (Zofran) 4 MG tablet, Take 1 tablet by mouth Every 6 (Six) Hours As Needed for Nausea or Vomiting., Disp: 30 tablet, Rfl: 1  •  prenatal vitamin (prenatal, CLASSIC, vitamin) tablet, Take  by mouth Daily., Disp: , Rfl:   •  vitamin B-6 (PYRIDOXINE) 100 MG tablet, Take 1 tablet by mouth Daily., Disp: 30 tablet, Rfl: 1    Family History:  Family History   Problem Relation Age of Onset   • Mitral valve prolapse Sister    • Pneumonia Sister          age 24   • Arrhythmia Brother         complete heart block/ pacemaker age 4/  age 28   • Bradycardia Brother    • Congenital heart disease Daughter         possible complete heart block/ resolved   • Aortic stenosis Nephew    • Breast cancer Neg Hx    • Ovarian cancer Neg Hx    • Uterine cancer Neg Hx    • Colon cancer Neg Hx    • Malig Hyperthermia Neg Hx      There is a family history of a sibling who had complete heart block and needed a pacemaker as a child and tragically  suddenly of a heart attack in his 20s.  .   There is also a family history of aortic valve stenosis and sinus bradycardia.    Imaging: A complete 2-D, color, and spectral doppler fetal echocardiogram was performed.  A full report is available separately.  In summary, today's findings show the following:     - Normal fetal cardiac anatomy and function  at 26 weeks gestation.  - Normal appearance of the aortic valve, no evidence of stenosis or regurgitation.  - Normal appearance of the mitral valve, no evidence of prolapse or stenosis or regurgitation.  - Normal fetal heart rate and rhythm throughout the study, 1:1 AV conduction, and normal mechanical AL interval.    A complete, detailed fetal echocardiogram can identify most major heart defects.  However, there are known limitations to this examination including a limited ability to diagnose some small atrial or ventricular septal defects, minor valve abnormalities, persistent patency of the ductus arteriosus, coarctation of the aorta, and abnormalities in small structures such as coronary arteries and pulmonary veins.    Discussion:   Findings were explained to patient and and her family.  The echo display screens in our examination room   were used.  Questions were answered at length and patient expressed understanding.  I explained the normal fetal circulation, today's fetal echocardiogram findings, the expected course of pregnancy, the impact of today's results on the location and mode of delivery and the expected  course.     Impression: In summary, today's evaluation found the followin) Normal fetal cardiac anatomy and function, and normal fetal heart rate and rhythm.   2) 26 weeks gestation    Recommendations:   1. There is no evidence of a ductal dependent fetal cardiac lesion.  I do not anticipate the need for immediate initiation of prostaglandin therapy and pediatric cardiology evaluation after birth.  2. There is no fetal cardiac indication to delivery at a hospital which provides specialized pediatric cardiac care.  Daphne is currently planning to delivery at [facility], which is appropriate from the standpoint of the fetal heart and circulation.  3. There is no increased fetal cardiac risk from a trial of labor and vaginal or cesearian delivery based on today's fetal  cardiac findings.  4. Based on the results of today's examination, no further fetal echocardiograms are recommended during pregnancy.  5.  Recommend routine  and well  after birth by a primary care provider, further evaluation by pediatric cardiology is recommended only as needed.  6. I would be happy to see Daphne again during pregnancy for any changes, problems, or concerns that may arise.  Please do not hesitate to call with any questions you may have.    Thank you for allowing me to share with you in the care of your patient.    I personally spent 60 minutes of direct provider time for the visit today, including review of prior OB and MFM medical records, face-to-face discussion and review of fetal cardiac images in the examination room, and charting.     Dandy James MD  [facility]  Pediatric Cardiology  [phone]    2022  10:44 EDT

## 2022-08-29 ENCOUNTER — ROUTINE PRENATAL (OUTPATIENT)
Dept: OBSTETRICS AND GYNECOLOGY | Age: 32
End: 2022-08-29

## 2022-08-29 VITALS — SYSTOLIC BLOOD PRESSURE: 104 MMHG | WEIGHT: 133 LBS | DIASTOLIC BLOOD PRESSURE: 64 MMHG | BODY MASS INDEX: 24.33 KG/M2

## 2022-08-29 DIAGNOSIS — Z34.82 PRENATAL CARE, SUBSEQUENT PREGNANCY IN SECOND TRIMESTER: Primary | ICD-10-CM

## 2022-08-29 DIAGNOSIS — Z87.59 HISTORY OF PLACENTAL ABRUPTION: ICD-10-CM

## 2022-08-29 DIAGNOSIS — I34.0 MITRAL REGURGITATION OF MOTHER DURING PREGNANCY: ICD-10-CM

## 2022-08-29 DIAGNOSIS — Z13.89 SCREENING FOR BLOOD OR PROTEIN IN URINE: ICD-10-CM

## 2022-08-29 DIAGNOSIS — O99.419 MITRAL REGURGITATION OF MOTHER DURING PREGNANCY: ICD-10-CM

## 2022-08-29 DIAGNOSIS — Z15.89 HETEROZYGOUS MTHFR MUTATION C677T: ICD-10-CM

## 2022-08-29 DIAGNOSIS — Z13.1 SCREENING FOR DIABETES MELLITUS: ICD-10-CM

## 2022-08-29 DIAGNOSIS — Z82.79 FAMILY HISTORY OF CONGENITAL HEART DEFECT: ICD-10-CM

## 2022-08-29 DIAGNOSIS — Z23 ENCOUNTER FOR ADMINISTRATION OF VACCINE: ICD-10-CM

## 2022-08-29 DIAGNOSIS — Z98.891 HISTORY OF 2 CESAREAN SECTIONS: ICD-10-CM

## 2022-08-29 DIAGNOSIS — Z30.2 REQUEST FOR STERILIZATION: ICD-10-CM

## 2022-08-29 DIAGNOSIS — Z13.0 SCREENING FOR IRON DEFICIENCY ANEMIA: ICD-10-CM

## 2022-08-29 DIAGNOSIS — O99.019 MATERNAL ANEMIA IN PREGNANCY, ANTEPARTUM: ICD-10-CM

## 2022-08-29 PROCEDURE — 0502F SUBSEQUENT PRENATAL CARE: CPT | Performed by: OBSTETRICS & GYNECOLOGY

## 2022-08-29 NOTE — PROGRESS NOTES
Patient reports some cramping - enc increased hydration   Reports good fetal movement    Fetal ECHO wnl   Patient has ECHO this week - s/p cards consult   Check TSH today per cards   One-hour gtt today   F/U with MFM 9/12 and me 9/14   PTL warnings

## 2022-08-30 PROBLEM — O99.019 MATERNAL ANEMIA IN PREGNANCY, ANTEPARTUM: Status: ACTIVE | Noted: 2022-08-30

## 2022-08-30 LAB
GLUCOSE 1H P 50 G GLC PO SERPL-MCNC: 108 MG/DL (ref 65–139)
HCT VFR BLD AUTO: 30.3 % (ref 34–46.6)
HGB BLD-MCNC: 9.9 G/DL (ref 11.1–15.9)
T4 FREE SERPL-MCNC: 0.98 NG/DL (ref 0.82–1.77)
TSH SERPL DL<=0.005 MIU/L-ACNC: 1.41 UIU/ML (ref 0.45–4.5)

## 2022-08-30 RX ORDER — FERROUS SULFATE 325(65) MG
325 TABLET ORAL
Qty: 90 TABLET | Refills: 2 | Status: SHIPPED | OUTPATIENT
Start: 2022-08-30 | End: 2022-11-28

## 2022-08-30 NOTE — PROGRESS NOTES
Call patient and notify of normal results of thyroid labs - anemic so Iron supplement sent to pharmacy and normal one -hour gtt

## 2022-09-01 ENCOUNTER — HOSPITAL ENCOUNTER (OUTPATIENT)
Dept: CARDIOLOGY | Facility: HOSPITAL | Age: 32
Discharge: HOME | End: 2022-09-01
Admitting: INTERNAL MEDICINE

## 2022-09-01 VITALS
BODY MASS INDEX: 24.48 KG/M2 | DIASTOLIC BLOOD PRESSURE: 82 MMHG | HEIGHT: 62 IN | SYSTOLIC BLOOD PRESSURE: 104 MMHG | WEIGHT: 133 LBS

## 2022-09-01 DIAGNOSIS — Z3A.26 26 WEEKS GESTATION OF PREGNANCY: ICD-10-CM

## 2022-09-01 DIAGNOSIS — Q24.9 CONGENITAL HEART DISEASE DURING PREGNANCY: Primary | ICD-10-CM

## 2022-09-01 DIAGNOSIS — O99.891 CONGENITAL HEART DISEASE DURING PREGNANCY: Primary | ICD-10-CM

## 2022-09-01 DIAGNOSIS — Z82.79 FAMILY HISTORY OF CONGENITAL HEART DEFECT: ICD-10-CM

## 2022-09-01 DIAGNOSIS — Z87.74 PERSONAL HISTORY OF VENTRICULAR SEPTAL DEFECT: ICD-10-CM

## 2022-09-01 DIAGNOSIS — I34.0 MITRAL REGURGITATION OF MOTHER DURING PREGNANCY: ICD-10-CM

## 2022-09-01 DIAGNOSIS — O99.419 MITRAL REGURGITATION OF MOTHER DURING PREGNANCY: ICD-10-CM

## 2022-09-01 DIAGNOSIS — R94.31 SHORTENED PR INTERVAL: ICD-10-CM

## 2022-09-01 LAB
AORTIC DIMENSIONLESS INDEX: 0.7 (DI)
BH CV ECHO MEAS - ACS: 1.61 CM
BH CV ECHO MEAS - AO MAX PG: 6.8 MMHG
BH CV ECHO MEAS - AO MEAN PG: 4 MMHG
BH CV ECHO MEAS - AO ROOT DIAM: 2.46 CM
BH CV ECHO MEAS - AO V2 MAX: 130 CM/SEC
BH CV ECHO MEAS - AO V2 VTI: 22.9 CM
BH CV ECHO MEAS - AVA(I,D): 2.1 CM2
BH CV ECHO MEAS - EDV(CUBED): 97.3 ML
BH CV ECHO MEAS - EDV(MOD-SP2): 57 ML
BH CV ECHO MEAS - EDV(MOD-SP4): 72 ML
BH CV ECHO MEAS - EF(MOD-BP): 55.2 %
BH CV ECHO MEAS - EF(MOD-SP2): 54.4 %
BH CV ECHO MEAS - EF(MOD-SP4): 55.6 %
BH CV ECHO MEAS - ESV(CUBED): 33.8 ML
BH CV ECHO MEAS - ESV(MOD-SP2): 26 ML
BH CV ECHO MEAS - ESV(MOD-SP4): 32 ML
BH CV ECHO MEAS - FS: 29.7 %
BH CV ECHO MEAS - IVS/LVPW: 0.88 CM
BH CV ECHO MEAS - IVSD: 0.7 CM
BH CV ECHO MEAS - LAT PEAK E' VEL: 23.2 CM/SEC
BH CV ECHO MEAS - LV DIASTOLIC VOL/BSA (35-75): 44.8 CM2
BH CV ECHO MEAS - LV MASS(C)D: 108.5 GRAMS
BH CV ECHO MEAS - LV MAX PG: 3.1 MMHG
BH CV ECHO MEAS - LV MEAN PG: 2 MMHG
BH CV ECHO MEAS - LV SYSTOLIC VOL/BSA (12-30): 19.9 CM2
BH CV ECHO MEAS - LV V1 MAX: 88.6 CM/SEC
BH CV ECHO MEAS - LV V1 VTI: 15.5 CM
BH CV ECHO MEAS - LVIDD: 4.6 CM
BH CV ECHO MEAS - LVIDS: 3.2 CM
BH CV ECHO MEAS - LVOT AREA: 3.1 CM2
BH CV ECHO MEAS - LVOT DIAM: 1.99 CM
BH CV ECHO MEAS - LVPWD: 0.8 CM
BH CV ECHO MEAS - MED PEAK E' VEL: 13.9 CM/SEC
BH CV ECHO MEAS - MR MAX PG: 87.8 MMHG
BH CV ECHO MEAS - MR MAX VEL: 468.4 CM/SEC
BH CV ECHO MEAS - MV A MAX VEL: 49.8 CM/SEC
BH CV ECHO MEAS - MV DEC SLOPE: 528.8 CM/SEC2
BH CV ECHO MEAS - MV DEC TIME: 0.14 MSEC
BH CV ECHO MEAS - MV E MAX VEL: 77.6 CM/SEC
BH CV ECHO MEAS - MV E/A: 1.56
BH CV ECHO MEAS - MV MAX PG: 3.7 MMHG
BH CV ECHO MEAS - MV MEAN PG: 1.76 MMHG
BH CV ECHO MEAS - MV P1/2T: 60.4 MSEC
BH CV ECHO MEAS - MV V2 VTI: 18.9 CM
BH CV ECHO MEAS - MVA(P1/2T): 3.6 CM2
BH CV ECHO MEAS - MVA(VTI): 2.5 CM2
BH CV ECHO MEAS - PA V2 MAX: 107.5 CM/SEC
BH CV ECHO MEAS - QP/QS: 0.58
BH CV ECHO MEAS - RAP SYSTOLE: 3 MMHG
BH CV ECHO MEAS - RV MAX PG: 1.28 MMHG
BH CV ECHO MEAS - RV V1 MAX: 56.6 CM/SEC
BH CV ECHO MEAS - RV V1 VTI: 12.6 CM
BH CV ECHO MEAS - RVOT DIAM: 1.68 CM
BH CV ECHO MEAS - SI(MOD-SP2): 19.3 ML/M2
BH CV ECHO MEAS - SI(MOD-SP4): 24.9 ML/M2
BH CV ECHO MEAS - SV(LVOT): 48.1 ML
BH CV ECHO MEAS - SV(MOD-SP2): 31 ML
BH CV ECHO MEAS - SV(MOD-SP4): 40 ML
BH CV ECHO MEAS - SV(RVOT): 28 ML
BH CV ECHO MEASUREMENTS AVERAGE E/E' RATIO: 4.18
BH CV XLRA - TDI S': 14.3 CM/SEC
LEFT ATRIUM VOLUME INDEX: 21.1 ML/M2
MAXIMAL PREDICTED HEART RATE: 190 BPM
SINUS: 2.3 CM
STJ: 2.05 CM
STRESS TARGET HR: 162 BPM

## 2022-09-01 PROCEDURE — 93306 TTE W/DOPPLER COMPLETE: CPT

## 2022-09-01 PROCEDURE — 93306 TTE W/DOPPLER COMPLETE: CPT | Performed by: INTERNAL MEDICINE

## 2022-09-01 NOTE — PROGRESS NOTES
I called.  Her echo was normal.  I did call [facility] and they are excepting adult referrals for genetics, so I placed that order.    Esthela, it would not give me the option of saying the referral was external/outgoing.  It only gave her the option of internal.  [facility].    [facility]

## 2022-09-12 ENCOUNTER — OFFICE VISIT (OUTPATIENT)
Dept: OBSTETRICS AND GYNECOLOGY | Facility: CLINIC | Age: 32
End: 2022-09-12

## 2022-09-12 ENCOUNTER — HOSPITAL ENCOUNTER (OUTPATIENT)
Dept: ULTRASOUND IMAGING | Facility: HOSPITAL | Age: 32
Discharge: HOME | End: 2022-09-12
Admitting: OBSTETRICS & GYNECOLOGY

## 2022-09-12 VITALS
BODY MASS INDEX: 24.98 KG/M2 | HEART RATE: 72 BPM | SYSTOLIC BLOOD PRESSURE: 121 MMHG | DIASTOLIC BLOOD PRESSURE: 72 MMHG | TEMPERATURE: 97.8 F | WEIGHT: 136.6 LBS

## 2022-09-12 DIAGNOSIS — O99.419 MITRAL REGURGITATION OF MOTHER DURING PREGNANCY: ICD-10-CM

## 2022-09-12 DIAGNOSIS — Z87.59 HISTORY OF PLACENTAL ABRUPTION: ICD-10-CM

## 2022-09-12 DIAGNOSIS — Z82.79 FAMILY HISTORY OF CONGENITAL HEART DEFECT: ICD-10-CM

## 2022-09-12 DIAGNOSIS — Z82.79 FAMILY HISTORY OF CONGENITAL HEART DEFECT: Primary | ICD-10-CM

## 2022-09-12 DIAGNOSIS — I34.0 MITRAL REGURGITATION OF MOTHER DURING PREGNANCY: ICD-10-CM

## 2022-09-12 PROCEDURE — 99214 OFFICE O/P EST MOD 30 MIN: CPT | Performed by: OBSTETRICS & GYNECOLOGY

## 2022-09-12 PROCEDURE — 76816 OB US FOLLOW-UP PER FETUS: CPT | Performed by: OBSTETRICS & GYNECOLOGY

## 2022-09-12 PROCEDURE — 76816 OB US FOLLOW-UP PER FETUS: CPT

## 2022-09-12 NOTE — PROGRESS NOTES
MATERNAL FETAL MEDICINE Follow up NOTE       The patient presents for a Maternal Fetal Medicine office visit secondary to placental abruption, strong family history of cardiac disease, and mitral regurgitation.        Patient is 32 weeks, as you recall from her last visit she has had several family members passed away from cardiac disease and she also has been noted to have mitral regurgitation.    Since her last visit she has seen cardiology and has had an echo her echo was within normal limits with mild mitral regurgitation.  I did review the cardiology consult note and the echo.  I believe she is also been referred to genetic counseling for her family history of cardiac disease.    Patient denies any obstetrical issues or problems today and reports good fetal movement All other systems are negative.      Vitals:    22 0931   BP: 121/72   BP Location: Right arm   Patient Position: Sitting   Pulse: 72   Temp: 97.8 °F (36.6 °C)   TempSrc: Temporal   Weight: 62 kg (136 lb 9.6 oz)       PHYSICAL EXAM   GENERAL: Not in acute distress, gravid   NEURO: awake, alert and oriented to person, place, and time  ABDOMINAL: No fundal tenderness, no rebound or guarding       ULTRASOUND   Ultrasound was reviewed and interpreted today.  Fetus is measuring 3 pounds 3 ounces 57 percentile. no fetal abnormalities were noted today.  BPP was 8 out of 8        IMPRESSIONS:  Diagnoses and all orders for this visit:    1. Family history of congenital heart defect (Primary)  Overview:  Brother  from complete heart block  Pt labs:   - SSA/SSB, - anticardiolipin   Fetal ECHO wnl - 10/4/17    ECHO recommended      2. Mitral regurgitation of mother during pregnancy  Overview:  Mild to mod mitral regurg on 17 visit to [facility] - rec ECHO and EKG 4 months EF - [facility]       3. History of placental abruption  Overview:  First Pregnancy            RECOMMENDATIONS:    1.  Family history of congenital heart defect and cardiac  disease.  Patient has had a fetal echo this pregnancy that was within normal limits I reviewed her fetal echo report  today.    Patient is also had a cardiology consult with a maternal echo that was within normal limits with only mild mitral regurgitation.    2.  Mitral regurgitation.  Patient denies any symptoms today.  Overall mild mitral regurgitation is well-tolerated during pregnancy and labor and delivery.  Patient had reported that her mitral regurgitation gets worse as the pregnancy gets farther along.  I believe her echo was at the end of August which would have placed her in the third trimester.  Therefore unless she is having symptoms I would not recommend another echo unless cardiology recommended.    3.  Placental abruption.  I would recommend weekly  testing through your facility and a repeat growth scan in 4 weeks.  Reviewed with the patient fetal movement and fetal kick counts which she expressed understanding    At this time I have not scheduled this patient back for any further follow-up visits, but please feel free to call or reconsult if needed.    MATERNAL FETAL MEDICINE     This was a 30 minute outpatient consult in which over 50% of my time was spent in reviewing the patient's records, counseling, charting, and/or  coordinating care.

## 2022-09-12 NOTE — PROGRESS NOTES
Pt reports that she is doing well and denies vaginal bleeding or LOF at this time. Notes irregular abdominal cramping, more with activity that is relieved with rest and hydration. Reports active fetal movement. Denies HA, visual changes or epigastric pain. Notes increase in feet swelling in the evening. Next OB appointment scheduled for 9/14.    Vitals:    09/12/22 0931   BP: 121/72   Pulse: 72   Temp: 97.8 °F (36.6 °C)

## 2022-09-13 ENCOUNTER — APPOINTMENT (OUTPATIENT)
Dept: ULTRASOUND IMAGING | Facility: HOSPITAL | Age: 32
End: 2022-09-13

## 2022-09-19 ENCOUNTER — ROUTINE PRENATAL (OUTPATIENT)
Dept: OBSTETRICS AND GYNECOLOGY | Age: 32
End: 2022-09-19

## 2022-09-19 ENCOUNTER — PREP FOR SURGERY (OUTPATIENT)
Dept: OTHER | Facility: HOSPITAL | Age: 32
End: 2022-09-19

## 2022-09-19 VITALS — SYSTOLIC BLOOD PRESSURE: 124 MMHG | WEIGHT: 137 LBS | BODY MASS INDEX: 25.06 KG/M2 | DIASTOLIC BLOOD PRESSURE: 68 MMHG

## 2022-09-19 DIAGNOSIS — N80.6 ENDOMETRIOSIS IN SCAR OF SKIN: ICD-10-CM

## 2022-09-19 DIAGNOSIS — I34.0 MITRAL REGURGITATION OF MOTHER DURING PREGNANCY: ICD-10-CM

## 2022-09-19 DIAGNOSIS — Z82.79 FAMILY HISTORY OF CONGENITAL HEART DEFECT: ICD-10-CM

## 2022-09-19 DIAGNOSIS — Z13.89 SCREENING FOR BLOOD OR PROTEIN IN URINE: ICD-10-CM

## 2022-09-19 DIAGNOSIS — Z15.89 HETEROZYGOUS MTHFR MUTATION C677T: ICD-10-CM

## 2022-09-19 DIAGNOSIS — Z87.59 HISTORY OF PLACENTAL ABRUPTION: ICD-10-CM

## 2022-09-19 DIAGNOSIS — O99.019 MATERNAL ANEMIA IN PREGNANCY, ANTEPARTUM: ICD-10-CM

## 2022-09-19 DIAGNOSIS — O99.419 MITRAL REGURGITATION OF MOTHER DURING PREGNANCY: ICD-10-CM

## 2022-09-19 DIAGNOSIS — Z98.891 PREVIOUS CESAREAN SECTION: Primary | ICD-10-CM

## 2022-09-19 DIAGNOSIS — Z98.891 HISTORY OF 2 CESAREAN SECTIONS: ICD-10-CM

## 2022-09-19 DIAGNOSIS — Z30.2 REQUEST FOR STERILIZATION: ICD-10-CM

## 2022-09-19 DIAGNOSIS — Z34.83 PRENATAL CARE, SUBSEQUENT PREGNANCY IN THIRD TRIMESTER: Primary | ICD-10-CM

## 2022-09-19 LAB
BILIRUB BLD-MCNC: NEGATIVE MG/DL
GLUCOSE UR STRIP-MCNC: NEGATIVE MG/DL
KETONES UR QL: NEGATIVE
LEUKOCYTE EST, POC: NEGATIVE
NITRITE UR-MCNC: NEGATIVE MG/ML
PH UR: 6.5 [PH] (ref 5–8)
PROT UR STRIP-MCNC: NEGATIVE MG/DL
RBC # UR STRIP: NEGATIVE /UL
SP GR UR: 1.01 (ref 1–1.03)
UROBILINOGEN UR QL: NORMAL

## 2022-09-19 PROCEDURE — 0502F SUBSEQUENT PRENATAL CARE: CPT | Performed by: OBSTETRICS & GYNECOLOGY

## 2022-09-19 PROCEDURE — 90715 TDAP VACCINE 7 YRS/> IM: CPT | Performed by: OBSTETRICS & GYNECOLOGY

## 2022-09-19 PROCEDURE — 90471 IMMUNIZATION ADMIN: CPT | Performed by: OBSTETRICS & GYNECOLOGY

## 2022-09-19 RX ORDER — METHYLERGONOVINE MALEATE 0.2 MG/ML
200 INJECTION INTRAVENOUS AS NEEDED
Status: CANCELLED | OUTPATIENT
Start: 2022-09-19

## 2022-09-19 RX ORDER — SODIUM CHLORIDE 0.9 % (FLUSH) 0.9 %
10 SYRINGE (ML) INJECTION AS NEEDED
Status: CANCELLED | OUTPATIENT
Start: 2022-09-19

## 2022-09-19 RX ORDER — OXYTOCIN-SODIUM CHLORIDE 0.9% IV SOLN 30 UNIT/500ML 30-0.9/5 UT/ML-%
999 SOLUTION INTRAVENOUS ONCE
Status: CANCELLED | OUTPATIENT
Start: 2022-09-19 | End: 2022-09-19

## 2022-09-19 RX ORDER — ONDANSETRON 2 MG/ML
4 INJECTION INTRAMUSCULAR; INTRAVENOUS EVERY 6 HOURS PRN
Status: CANCELLED | OUTPATIENT
Start: 2022-09-19

## 2022-09-19 RX ORDER — ONDANSETRON 4 MG/1
4 TABLET, FILM COATED ORAL EVERY 6 HOURS PRN
Status: CANCELLED | OUTPATIENT
Start: 2022-09-19

## 2022-09-19 RX ORDER — CARBOPROST TROMETHAMINE 250 UG/ML
250 INJECTION, SOLUTION INTRAMUSCULAR AS NEEDED
Status: CANCELLED | OUTPATIENT
Start: 2022-09-19

## 2022-09-19 RX ORDER — ACETAMINOPHEN 500 MG
1000 TABLET ORAL ONCE
Status: CANCELLED | OUTPATIENT
Start: 2022-09-19 | End: 2022-09-19

## 2022-09-19 RX ORDER — LIDOCAINE HYDROCHLORIDE 10 MG/ML
5 INJECTION, SOLUTION EPIDURAL; INFILTRATION; INTRACAUDAL; PERINEURAL AS NEEDED
Status: CANCELLED | OUTPATIENT
Start: 2022-09-19

## 2022-09-19 RX ORDER — CEFAZOLIN SODIUM 2 G/100ML
2 INJECTION, SOLUTION INTRAVENOUS ONCE
Status: CANCELLED | OUTPATIENT
Start: 2022-09-19 | End: 2022-09-19

## 2022-09-19 RX ORDER — SODIUM CHLORIDE 0.9 % (FLUSH) 0.9 %
10 SYRINGE (ML) INJECTION EVERY 12 HOURS SCHEDULED
Status: CANCELLED | OUTPATIENT
Start: 2022-09-19

## 2022-09-19 RX ORDER — SODIUM CHLORIDE, SODIUM LACTATE, POTASSIUM CHLORIDE, CALCIUM CHLORIDE 600; 310; 30; 20 MG/100ML; MG/100ML; MG/100ML; MG/100ML
125 INJECTION, SOLUTION INTRAVENOUS CONTINUOUS
Status: CANCELLED | OUTPATIENT
Start: 2022-09-19

## 2022-09-19 RX ORDER — SODIUM CHLORIDE 0.9 % (FLUSH) 0.9 %
3 SYRINGE (ML) INJECTION EVERY 12 HOURS SCHEDULED
Status: CANCELLED | OUTPATIENT
Start: 2022-09-19

## 2022-09-19 RX ORDER — OXYTOCIN-SODIUM CHLORIDE 0.9% IV SOLN 30 UNIT/500ML 30-0.9/5 UT/ML-%
250 SOLUTION INTRAVENOUS CONTINUOUS PRN
Status: CANCELLED | OUTPATIENT
Start: 2022-09-19 | End: 2022-09-19

## 2022-09-19 RX ORDER — KETOROLAC TROMETHAMINE 30 MG/ML
30 INJECTION, SOLUTION INTRAMUSCULAR; INTRAVENOUS ONCE
Status: CANCELLED | OUTPATIENT
Start: 2022-09-19 | End: 2022-09-19

## 2022-09-19 NOTE — PROGRESS NOTES
Chief Complaint   Patient presents with   • Routine Prenatal Visit     Cc: ob check - MFM visit 22 - no ob complaints just feeling tired        HPI: 32 y.o.  at 30w3d presents for prenatal care     Vitals:    22 1016   BP: 124/68   Weight: 62.1 kg (137 lb)     Total weight gain for pregnancy:  5.443 kg (12 lb)    Review of systems:   Gen: + fatigue  CV:     negative  GI: negative  :   negative and good fetal movement noted   MS:    negative  Neuro: negative  Pul: negative    A/P  1. Intrauterine pregnancy at 30w3d   2. Pregnancy Risk:  HIGH RISK        Diagnoses and all orders for this visit:    1. Prenatal care, subsequent pregnancy in third trimester (Primary)    2. Screening for blood or protein in urine  -     POC Urinalysis Dipstick    3. Heterozygous MTHFR mutation C677T    4. Family history of congenital heart defect    5. Request for sterilization    6. Endometriosis in scar of skin    7. Mitral regurgitation of mother during pregnancy    8. History of placental abruption    9. History of 2  sections    10. Maternal anemia in pregnancy, antepartum        Pre-eclampsia symptoms were discussed and warnings were given.   labor was discussed.  Warnings were provided.  -----------------------  PLAN:   Return in about 2 weeks (around 10/3/2022), or ob check and BPP.  H/o abruption - weekly BPPS   RLTCS/BTL scheduled   Anemia- Cont Iron   tdap today   PTL warnings       Delores Le MD  2022 10:34 EDT

## 2022-10-03 ENCOUNTER — ROUTINE PRENATAL (OUTPATIENT)
Dept: OBSTETRICS AND GYNECOLOGY | Age: 32
End: 2022-10-03

## 2022-10-03 ENCOUNTER — HOSPITAL ENCOUNTER (OUTPATIENT)
Facility: HOSPITAL | Age: 32
Discharge: HOME | End: 2022-10-03
Attending: OBSTETRICS & GYNECOLOGY | Admitting: OBSTETRICS & GYNECOLOGY

## 2022-10-03 VITALS
HEART RATE: 79 BPM | TEMPERATURE: 98.2 F | DIASTOLIC BLOOD PRESSURE: 73 MMHG | SYSTOLIC BLOOD PRESSURE: 118 MMHG | OXYGEN SATURATION: 100 % | RESPIRATION RATE: 24 BRPM

## 2022-10-03 VITALS — WEIGHT: 139 LBS | BODY MASS INDEX: 25.42 KG/M2 | SYSTOLIC BLOOD PRESSURE: 108 MMHG | DIASTOLIC BLOOD PRESSURE: 64 MMHG

## 2022-10-03 DIAGNOSIS — Z15.89 HETEROZYGOUS MTHFR MUTATION C677T: ICD-10-CM

## 2022-10-03 DIAGNOSIS — Z98.891 HISTORY OF 2 CESAREAN SECTIONS: ICD-10-CM

## 2022-10-03 DIAGNOSIS — Z13.89 SCREENING FOR BLOOD OR PROTEIN IN URINE: ICD-10-CM

## 2022-10-03 DIAGNOSIS — I34.0 MITRAL REGURGITATION OF MOTHER DURING PREGNANCY: ICD-10-CM

## 2022-10-03 DIAGNOSIS — Z30.2 REQUEST FOR STERILIZATION: ICD-10-CM

## 2022-10-03 DIAGNOSIS — O99.419 MITRAL REGURGITATION OF MOTHER DURING PREGNANCY: ICD-10-CM

## 2022-10-03 DIAGNOSIS — Z82.79 FAMILY HISTORY OF CONGENITAL HEART DEFECT: ICD-10-CM

## 2022-10-03 DIAGNOSIS — Z87.59 HISTORY OF PLACENTAL ABRUPTION: ICD-10-CM

## 2022-10-03 DIAGNOSIS — Z34.83 PRENATAL CARE, SUBSEQUENT PREGNANCY IN THIRD TRIMESTER: Primary | ICD-10-CM

## 2022-10-03 DIAGNOSIS — O99.019 MATERNAL ANEMIA IN PREGNANCY, ANTEPARTUM: ICD-10-CM

## 2022-10-03 PROBLEM — Z34.90 PREGNANCY: Status: ACTIVE | Noted: 2022-10-03

## 2022-10-03 LAB
ALBUMIN SERPL-MCNC: 3.8 G/DL (ref 3.5–5.2)
ALBUMIN/GLOB SERPL: 1.5 G/DL
ALP SERPL-CCNC: 97 U/L (ref 39–117)
ALT SERPL W P-5'-P-CCNC: 13 U/L (ref 1–33)
ANION GAP SERPL CALCULATED.3IONS-SCNC: 12.2 MMOL/L (ref 5–15)
AST SERPL-CCNC: 19 U/L (ref 1–32)
BILIRUB BLD-MCNC: NEGATIVE MG/DL
BILIRUB SERPL-MCNC: 0.2 MG/DL (ref 0–1.2)
BUN SERPL-MCNC: 4 MG/DL (ref 6–20)
BUN/CREAT SERPL: 7.7 (ref 7–25)
CALCIUM SPEC-SCNC: 9.2 MG/DL (ref 8.6–10.5)
CHLORIDE SERPL-SCNC: 102 MMOL/L (ref 98–107)
CO2 SERPL-SCNC: 21.8 MMOL/L (ref 22–29)
CREAT SERPL-MCNC: 0.52 MG/DL (ref 0.57–1)
DEPRECATED RDW RBC AUTO: 40.4 FL (ref 37–54)
EGFRCR SERPLBLD CKD-EPI 2021: 128.4 ML/MIN/1.73
ERYTHROCYTE [DISTWIDTH] IN BLOOD BY AUTOMATED COUNT: 13 % (ref 12.3–15.4)
GLOBULIN UR ELPH-MCNC: 2.6 GM/DL
GLUCOSE SERPL-MCNC: 103 MG/DL (ref 65–99)
GLUCOSE UR STRIP-MCNC: NEGATIVE MG/DL
HCT VFR BLD AUTO: 28.6 % (ref 34–46.6)
HGB BLD-MCNC: 9.7 G/DL (ref 12–15.9)
KETONES UR QL: NEGATIVE
LEUKOCYTE EST, POC: ABNORMAL
MCH RBC QN AUTO: 28.7 PG (ref 26.6–33)
MCHC RBC AUTO-ENTMCNC: 33.9 G/DL (ref 31.5–35.7)
MCV RBC AUTO: 84.6 FL (ref 79–97)
NITRITE UR-MCNC: NEGATIVE MG/ML
PH UR: 7 [PH] (ref 5–8)
PLATELET # BLD AUTO: 249 10*3/MM3 (ref 140–450)
PMV BLD AUTO: 11.1 FL (ref 6–12)
POTASSIUM SERPL-SCNC: 3.6 MMOL/L (ref 3.5–5.2)
PROT SERPL-MCNC: 6.4 G/DL (ref 6–8.5)
PROT UR STRIP-MCNC: NEGATIVE MG/DL
QT INTERVAL: 381 MS
RBC # BLD AUTO: 3.38 10*6/MM3 (ref 3.77–5.28)
RBC # UR STRIP: NEGATIVE /UL
SODIUM SERPL-SCNC: 136 MMOL/L (ref 136–145)
SP GR UR: 1.02 (ref 1–1.03)
UROBILINOGEN UR QL: NORMAL
WBC NRBC COR # BLD: 9.18 10*3/MM3 (ref 3.4–10.8)

## 2022-10-03 PROCEDURE — 93005 ELECTROCARDIOGRAM TRACING: CPT | Performed by: OBSTETRICS & GYNECOLOGY

## 2022-10-03 PROCEDURE — 0502F SUBSEQUENT PRENATAL CARE: CPT | Performed by: OBSTETRICS & GYNECOLOGY

## 2022-10-03 PROCEDURE — 80053 COMPREHEN METABOLIC PANEL: CPT | Performed by: OBSTETRICS & GYNECOLOGY

## 2022-10-03 PROCEDURE — G0463 HOSPITAL OUTPT CLINIC VISIT: HCPCS

## 2022-10-03 PROCEDURE — 59025 FETAL NON-STRESS TEST: CPT

## 2022-10-03 PROCEDURE — G0378 HOSPITAL OBSERVATION PER HR: HCPCS

## 2022-10-03 PROCEDURE — 59025 FETAL NON-STRESS TEST: CPT | Performed by: OBSTETRICS & GYNECOLOGY

## 2022-10-03 PROCEDURE — 85027 COMPLETE CBC AUTOMATED: CPT | Performed by: OBSTETRICS & GYNECOLOGY

## 2022-10-03 PROCEDURE — 25010000002 IRON SUCROSE PER 1 MG: Performed by: OBSTETRICS & GYNECOLOGY

## 2022-10-03 PROCEDURE — 96365 THER/PROPH/DIAG IV INF INIT: CPT

## 2022-10-03 PROCEDURE — 96366 THER/PROPH/DIAG IV INF ADDON: CPT

## 2022-10-03 PROCEDURE — 93010 ELECTROCARDIOGRAM REPORT: CPT | Performed by: INTERNAL MEDICINE

## 2022-10-03 RX ORDER — SODIUM CHLORIDE 0.9 % (FLUSH) 0.9 %
10 SYRINGE (ML) INJECTION AS NEEDED
Status: DISCONTINUED | OUTPATIENT
Start: 2022-10-03 | End: 2022-10-03 | Stop reason: HOSPADM

## 2022-10-03 RX ORDER — SODIUM CHLORIDE 0.9 % (FLUSH) 0.9 %
10 SYRINGE (ML) INJECTION EVERY 12 HOURS SCHEDULED
Status: DISCONTINUED | OUTPATIENT
Start: 2022-10-03 | End: 2022-10-03 | Stop reason: HOSPADM

## 2022-10-03 RX ADMIN — Medication 10 ML: at 16:19

## 2022-10-03 RX ADMIN — IRON SUCROSE 300 MG: 20 INJECTION, SOLUTION INTRAVENOUS at 16:19

## 2022-10-03 NOTE — NON STRESS TEST
Daphne Roberts, a  at 32w3d with an DENISE of 2022, by Last Menstrual Period, was seen at [facility] LABOR DELIVERY for a nonstress test.    Chief Complaint   Patient presents with   • Shortness of Breath     Outpt: pt sent from office for shortness of air with activity and at rest slightly worse with activity.  +FM.         Patient Active Problem List   Diagnosis   • Heterozygous MTHFR mutation C677T   • Family history of congenital heart defect   • Mitral regurgitation of mother during pregnancy   • History of placental abruption   • History of 2  sections   • Partial hydatidiform mole   • Endometriosis in scar of skin   • Nausea and vomiting in pregnancy   • Request for sterilization   • Maternal anemia in pregnancy, antepartum   • Pregnancy       Start Time: 1357  Stop Time: 1437    Interpretation A  Nonstress Test Interpretation A: Reactive

## 2022-10-03 NOTE — PROGRESS NOTES
Patient reports SOA on exertion   To L&D for evaluation   BPP today 8/8   Reports good fetal movement today   Cont Weekly BPP

## 2022-10-12 ENCOUNTER — HOSPITAL ENCOUNTER (OUTPATIENT)
Facility: HOSPITAL | Age: 32
Discharge: HOME | End: 2022-10-12
Attending: OBSTETRICS & GYNECOLOGY | Admitting: OBSTETRICS & GYNECOLOGY

## 2022-10-12 ENCOUNTER — ROUTINE PRENATAL (OUTPATIENT)
Dept: OBSTETRICS AND GYNECOLOGY | Age: 32
End: 2022-10-12

## 2022-10-12 VITALS — SYSTOLIC BLOOD PRESSURE: 108 MMHG | BODY MASS INDEX: 25.24 KG/M2 | DIASTOLIC BLOOD PRESSURE: 68 MMHG | WEIGHT: 138 LBS

## 2022-10-12 VITALS
SYSTOLIC BLOOD PRESSURE: 117 MMHG | BODY MASS INDEX: 25.4 KG/M2 | HEIGHT: 62 IN | OXYGEN SATURATION: 98 % | WEIGHT: 138 LBS | TEMPERATURE: 98 F | DIASTOLIC BLOOD PRESSURE: 70 MMHG | RESPIRATION RATE: 18 BRPM | HEART RATE: 80 BPM

## 2022-10-12 DIAGNOSIS — Z87.59 HISTORY OF PLACENTAL ABRUPTION: ICD-10-CM

## 2022-10-12 DIAGNOSIS — O99.019 MATERNAL ANEMIA IN PREGNANCY, ANTEPARTUM: ICD-10-CM

## 2022-10-12 DIAGNOSIS — Z30.2 REQUEST FOR STERILIZATION: ICD-10-CM

## 2022-10-12 DIAGNOSIS — Z98.891 HISTORY OF 2 CESAREAN SECTIONS: ICD-10-CM

## 2022-10-12 DIAGNOSIS — I34.0 MITRAL REGURGITATION OF MOTHER DURING PREGNANCY: ICD-10-CM

## 2022-10-12 DIAGNOSIS — Z82.79 FAMILY HISTORY OF CONGENITAL HEART DEFECT: ICD-10-CM

## 2022-10-12 DIAGNOSIS — Z34.83 PRENATAL CARE, SUBSEQUENT PREGNANCY IN THIRD TRIMESTER: Primary | ICD-10-CM

## 2022-10-12 DIAGNOSIS — O99.419 MITRAL REGURGITATION OF MOTHER DURING PREGNANCY: ICD-10-CM

## 2022-10-12 DIAGNOSIS — Z13.89 SCREENING FOR BLOOD OR PROTEIN IN URINE: ICD-10-CM

## 2022-10-12 PROCEDURE — G0378 HOSPITAL OBSERVATION PER HR: HCPCS

## 2022-10-12 PROCEDURE — 59025 FETAL NON-STRESS TEST: CPT

## 2022-10-12 PROCEDURE — 59025 FETAL NON-STRESS TEST: CPT | Performed by: OBSTETRICS & GYNECOLOGY

## 2022-10-12 PROCEDURE — 0502F SUBSEQUENT PRENATAL CARE: CPT | Performed by: OBSTETRICS & GYNECOLOGY

## 2022-10-12 PROCEDURE — 76819 FETAL BIOPHYS PROFIL W/O NST: CPT | Performed by: OBSTETRICS & GYNECOLOGY

## 2022-10-12 PROCEDURE — 25010000002 IRON SUCROSE PER 1 MG: Performed by: OBSTETRICS & GYNECOLOGY

## 2022-10-12 PROCEDURE — 96365 THER/PROPH/DIAG IV INF INIT: CPT

## 2022-10-12 RX ORDER — SODIUM CHLORIDE 0.9 % (FLUSH) 0.9 %
10 SYRINGE (ML) INJECTION AS NEEDED
Status: DISCONTINUED | OUTPATIENT
Start: 2022-10-12 | End: 2022-10-12 | Stop reason: HOSPADM

## 2022-10-12 RX ORDER — SODIUM CHLORIDE 0.9 % (FLUSH) 0.9 %
10 SYRINGE (ML) INJECTION EVERY 12 HOURS SCHEDULED
Status: CANCELLED | OUTPATIENT
Start: 2022-10-12

## 2022-10-12 RX ORDER — SODIUM CHLORIDE 0.9 % (FLUSH) 0.9 %
10 SYRINGE (ML) INJECTION EVERY 12 HOURS SCHEDULED
Status: DISCONTINUED | OUTPATIENT
Start: 2022-10-12 | End: 2022-10-12 | Stop reason: HOSPADM

## 2022-10-12 RX ORDER — SODIUM CHLORIDE 0.9 % (FLUSH) 0.9 %
10 SYRINGE (ML) INJECTION AS NEEDED
Status: CANCELLED | OUTPATIENT
Start: 2022-10-12

## 2022-10-12 RX ORDER — LIDOCAINE HYDROCHLORIDE 10 MG/ML
5 INJECTION, SOLUTION EPIDURAL; INFILTRATION; INTRACAUDAL; PERINEURAL AS NEEDED
Status: DISCONTINUED | OUTPATIENT
Start: 2022-10-12 | End: 2022-10-12 | Stop reason: HOSPADM

## 2022-10-12 RX ORDER — LIDOCAINE HYDROCHLORIDE 10 MG/ML
5 INJECTION, SOLUTION EPIDURAL; INFILTRATION; INTRACAUDAL; PERINEURAL AS NEEDED
Status: CANCELLED | OUTPATIENT
Start: 2022-10-12

## 2022-10-12 RX ADMIN — IRON SUCROSE 300 MG: 20 INJECTION, SOLUTION INTRAVENOUS at 14:22

## 2022-10-12 RX ADMIN — Medication 10 ML: at 14:20

## 2022-10-12 NOTE — PROGRESS NOTES
Patient reports back pain -declines  PT   Normal fetal growth today and BPP 8/8   Cont weekly BPP   PTL warnings   RLTCS/BTL 11/18/22

## 2022-10-12 NOTE — NON STRESS TEST
Daphne TARUN Robles Armando, a  at 33w5d with an DENISE of 2022, by Last Menstrual Period, was seen at [facility] LABOR DELIVERY for a nonstress test.    Chief Complaint   Patient presents with   • iron infusion     Anemia third trimester. No complications with previous infusion +FM -VB or LOF        Patient Active Problem List   Diagnosis   • Heterozygous MTHFR mutation C677T   • Family history of congenital heart defect   • Mitral regurgitation of mother during pregnancy   • History of placental abruption   • History of 2  sections   • Partial hydatidiform mole   • Endometriosis in scar of skin   • Nausea and vomiting in pregnancy   • Request for sterilization   • Maternal anemia in pregnancy, antepartum   • Pregnancy       Start Time: 1328  Stop Time: 1424    Interpretation A  Nonstress Test Interpretation A: Reactive

## 2022-10-20 ENCOUNTER — ROUTINE PRENATAL (OUTPATIENT)
Dept: OBSTETRICS AND GYNECOLOGY | Age: 32
End: 2022-10-20

## 2022-10-20 VITALS — DIASTOLIC BLOOD PRESSURE: 60 MMHG | BODY MASS INDEX: 25.42 KG/M2 | SYSTOLIC BLOOD PRESSURE: 120 MMHG | WEIGHT: 139 LBS

## 2022-10-20 DIAGNOSIS — Z87.59 HISTORY OF PLACENTAL ABRUPTION: ICD-10-CM

## 2022-10-20 DIAGNOSIS — Z98.891 HISTORY OF 2 CESAREAN SECTIONS: ICD-10-CM

## 2022-10-20 DIAGNOSIS — Z15.89 HETEROZYGOUS MTHFR MUTATION C677T: ICD-10-CM

## 2022-10-20 DIAGNOSIS — Z3A.34 34 WEEKS GESTATION OF PREGNANCY: ICD-10-CM

## 2022-10-20 DIAGNOSIS — Z82.79 FAMILY HISTORY OF CONGENITAL HEART DEFECT: ICD-10-CM

## 2022-10-20 DIAGNOSIS — Z13.89 SCREENING FOR BLOOD OR PROTEIN IN URINE: ICD-10-CM

## 2022-10-20 DIAGNOSIS — O99.419 MITRAL REGURGITATION OF MOTHER DURING PREGNANCY: ICD-10-CM

## 2022-10-20 DIAGNOSIS — Z34.83 PRENATAL CARE, SUBSEQUENT PREGNANCY IN THIRD TRIMESTER: Primary | ICD-10-CM

## 2022-10-20 DIAGNOSIS — O99.019 MATERNAL ANEMIA IN PREGNANCY, ANTEPARTUM: ICD-10-CM

## 2022-10-20 DIAGNOSIS — I34.0 MITRAL REGURGITATION OF MOTHER DURING PREGNANCY: ICD-10-CM

## 2022-10-20 LAB
BILIRUB BLD-MCNC: NEGATIVE MG/DL
GLUCOSE UR STRIP-MCNC: NEGATIVE MG/DL
KETONES UR QL: NEGATIVE
LEUKOCYTE EST, POC: NEGATIVE
NITRITE UR-MCNC: NEGATIVE MG/ML
PH UR: 6.5 [PH] (ref 5–8)
PROT UR STRIP-MCNC: NEGATIVE MG/DL
RBC # UR STRIP: NEGATIVE /UL
SP GR UR: 1.02 (ref 1–1.03)
UROBILINOGEN UR QL: NORMAL

## 2022-10-20 PROCEDURE — 0502F SUBSEQUENT PRENATAL CARE: CPT | Performed by: NURSE PRACTITIONER

## 2022-10-20 NOTE — PROGRESS NOTES
Doing well  Reports good FM  Denies LOF, bleeding or ctx's  BPP 8/8 , breech today   PTL/FKC discussed  R C/S with BTL scheduled 11/18  F/u 1 week with Dr Le

## 2022-10-26 ENCOUNTER — ROUTINE PRENATAL (OUTPATIENT)
Dept: OBSTETRICS AND GYNECOLOGY | Age: 32
End: 2022-10-26

## 2022-10-26 VITALS — BODY MASS INDEX: 25.97 KG/M2 | SYSTOLIC BLOOD PRESSURE: 110 MMHG | DIASTOLIC BLOOD PRESSURE: 70 MMHG | WEIGHT: 142 LBS

## 2022-10-26 DIAGNOSIS — Z34.83 PRENATAL CARE, SUBSEQUENT PREGNANCY IN THIRD TRIMESTER: Primary | ICD-10-CM

## 2022-10-26 DIAGNOSIS — Z13.89 SCREENING FOR BLOOD OR PROTEIN IN URINE: ICD-10-CM

## 2022-10-26 DIAGNOSIS — O99.019 MATERNAL ANEMIA IN PREGNANCY, ANTEPARTUM: ICD-10-CM

## 2022-10-26 DIAGNOSIS — I34.0 MITRAL REGURGITATION OF MOTHER DURING PREGNANCY: ICD-10-CM

## 2022-10-26 DIAGNOSIS — Z36.85 ANTENATAL SCREENING FOR STREPTOCOCCUS B: ICD-10-CM

## 2022-10-26 DIAGNOSIS — Z87.59 HISTORY OF PLACENTAL ABRUPTION: ICD-10-CM

## 2022-10-26 DIAGNOSIS — Z30.2 REQUEST FOR STERILIZATION: ICD-10-CM

## 2022-10-26 DIAGNOSIS — O99.419 MITRAL REGURGITATION OF MOTHER DURING PREGNANCY: ICD-10-CM

## 2022-10-26 DIAGNOSIS — Z98.891 HISTORY OF 2 CESAREAN SECTIONS: ICD-10-CM

## 2022-10-26 PROCEDURE — 0502F SUBSEQUENT PRENATAL CARE: CPT | Performed by: OBSTETRICS & GYNECOLOGY

## 2022-10-26 NOTE — PROGRESS NOTES
Patient reports good fetal movement   Anemia- s/p 2 IV iron infusions  BPP today 8/8   RLTCS/BTL at 39 weeks   GBS collected  Strict PTL warnings

## 2022-11-01 PROBLEM — O99.820 GBS (GROUP B STREPTOCOCCUS CARRIER), +RV CULTURE, CURRENTLY PREGNANT: Status: ACTIVE | Noted: 2022-11-01

## 2022-11-01 LAB — B-HEM STREP SPEC QL CULT: POSITIVE

## 2022-11-04 ENCOUNTER — HOSPITAL ENCOUNTER (OUTPATIENT)
Facility: HOSPITAL | Age: 32
Discharge: HOME | End: 2022-11-04
Attending: OBSTETRICS & GYNECOLOGY | Admitting: OBSTETRICS & GYNECOLOGY

## 2022-11-04 ENCOUNTER — ROUTINE PRENATAL (OUTPATIENT)
Dept: OBSTETRICS AND GYNECOLOGY | Age: 32
End: 2022-11-04

## 2022-11-04 ENCOUNTER — TELEPHONE (OUTPATIENT)
Dept: OBSTETRICS AND GYNECOLOGY | Age: 32
End: 2022-11-04

## 2022-11-04 VITALS
HEART RATE: 73 BPM | TEMPERATURE: 98.3 F | RESPIRATION RATE: 18 BRPM | DIASTOLIC BLOOD PRESSURE: 77 MMHG | HEIGHT: 62 IN | SYSTOLIC BLOOD PRESSURE: 122 MMHG | WEIGHT: 141 LBS | BODY MASS INDEX: 25.95 KG/M2

## 2022-11-04 VITALS — WEIGHT: 141 LBS | SYSTOLIC BLOOD PRESSURE: 104 MMHG | BODY MASS INDEX: 25.79 KG/M2 | DIASTOLIC BLOOD PRESSURE: 68 MMHG

## 2022-11-04 DIAGNOSIS — O99.419 MITRAL REGURGITATION OF MOTHER DURING PREGNANCY: ICD-10-CM

## 2022-11-04 DIAGNOSIS — Z30.2 REQUEST FOR STERILIZATION: ICD-10-CM

## 2022-11-04 DIAGNOSIS — Z82.79 FAMILY HISTORY OF CONGENITAL HEART DEFECT: ICD-10-CM

## 2022-11-04 DIAGNOSIS — Z34.83 PRENATAL CARE, SUBSEQUENT PREGNANCY IN THIRD TRIMESTER: Primary | ICD-10-CM

## 2022-11-04 DIAGNOSIS — I34.0 MITRAL REGURGITATION OF MOTHER DURING PREGNANCY: ICD-10-CM

## 2022-11-04 DIAGNOSIS — Z98.891 HISTORY OF 2 CESAREAN SECTIONS: ICD-10-CM

## 2022-11-04 DIAGNOSIS — Z15.89 HETEROZYGOUS MTHFR MUTATION C677T: ICD-10-CM

## 2022-11-04 DIAGNOSIS — Z13.89 SCREENING FOR BLOOD OR PROTEIN IN URINE: ICD-10-CM

## 2022-11-04 DIAGNOSIS — O99.019 MATERNAL ANEMIA IN PREGNANCY, ANTEPARTUM: ICD-10-CM

## 2022-11-04 DIAGNOSIS — O99.820 GBS (GROUP B STREPTOCOCCUS CARRIER), +RV CULTURE, CURRENTLY PREGNANT: ICD-10-CM

## 2022-11-04 DIAGNOSIS — Z87.59 HISTORY OF PLACENTAL ABRUPTION: ICD-10-CM

## 2022-11-04 PROBLEM — O36.8130 DECREASED FETAL MOVEMENTS IN THIRD TRIMESTER: Status: ACTIVE | Noted: 2022-11-04

## 2022-11-04 LAB
ABO GROUP BLD: NORMAL
BASOPHILS # BLD AUTO: 0.03 10*3/MM3 (ref 0–0.2)
BASOPHILS NFR BLD AUTO: 0.3 % (ref 0–1.5)
BILIRUB BLD-MCNC: NEGATIVE MG/DL
BLD GP AB SCN SERPL QL: NEGATIVE
DEPRECATED RDW RBC AUTO: 54.8 FL (ref 37–54)
EOSINOPHIL # BLD AUTO: 0.1 10*3/MM3 (ref 0–0.4)
EOSINOPHIL NFR BLD AUTO: 1 % (ref 0.3–6.2)
ERYTHROCYTE [DISTWIDTH] IN BLOOD BY AUTOMATED COUNT: 17 % (ref 12.3–15.4)
GLUCOSE UR STRIP-MCNC: NEGATIVE MG/DL
HCT VFR BLD AUTO: 33.6 % (ref 34–46.6)
HGB BLD-MCNC: 11.4 G/DL (ref 12–15.9)
IMM GRANULOCYTES # BLD AUTO: 0.06 10*3/MM3 (ref 0–0.05)
IMM GRANULOCYTES NFR BLD AUTO: 0.6 % (ref 0–0.5)
KETONES UR QL: NEGATIVE
LEUKOCYTE EST, POC: NEGATIVE
LYMPHOCYTES # BLD AUTO: 1.04 10*3/MM3 (ref 0.7–3.1)
LYMPHOCYTES NFR BLD AUTO: 10.8 % (ref 19.6–45.3)
MCH RBC QN AUTO: 30.4 PG (ref 26.6–33)
MCHC RBC AUTO-ENTMCNC: 33.9 G/DL (ref 31.5–35.7)
MCV RBC AUTO: 89.6 FL (ref 79–97)
MONOCYTES # BLD AUTO: 0.68 10*3/MM3 (ref 0.1–0.9)
MONOCYTES NFR BLD AUTO: 7.1 % (ref 5–12)
NEUTROPHILS NFR BLD AUTO: 7.72 10*3/MM3 (ref 1.7–7)
NEUTROPHILS NFR BLD AUTO: 80.2 % (ref 42.7–76)
NITRITE UR-MCNC: NEGATIVE MG/ML
NRBC BLD AUTO-RTO: 0 /100 WBC (ref 0–0.2)
PH UR: 6.5 [PH] (ref 5–8)
PLATELET # BLD AUTO: 230 10*3/MM3 (ref 140–450)
PMV BLD AUTO: 11.6 FL (ref 6–12)
PROT UR STRIP-MCNC: NEGATIVE MG/DL
RBC # BLD AUTO: 3.75 10*6/MM3 (ref 3.77–5.28)
RBC # UR STRIP: NEGATIVE /UL
RH BLD: POSITIVE
SP GR UR: 1.02 (ref 1–1.03)
T&S EXPIRATION DATE: NORMAL
UROBILINOGEN UR QL: NORMAL
WBC NRBC COR # BLD: 9.63 10*3/MM3 (ref 3.4–10.8)

## 2022-11-04 PROCEDURE — G0378 HOSPITAL OBSERVATION PER HR: HCPCS

## 2022-11-04 PROCEDURE — 86901 BLOOD TYPING SEROLOGIC RH(D): CPT | Performed by: OBSTETRICS & GYNECOLOGY

## 2022-11-04 PROCEDURE — 59025 FETAL NON-STRESS TEST: CPT | Performed by: OBSTETRICS & GYNECOLOGY

## 2022-11-04 PROCEDURE — 86900 BLOOD TYPING SEROLOGIC ABO: CPT | Performed by: OBSTETRICS & GYNECOLOGY

## 2022-11-04 PROCEDURE — 0502F SUBSEQUENT PRENATAL CARE: CPT | Performed by: OBSTETRICS & GYNECOLOGY

## 2022-11-04 PROCEDURE — G0463 HOSPITAL OUTPT CLINIC VISIT: HCPCS

## 2022-11-04 PROCEDURE — 85025 COMPLETE CBC W/AUTO DIFF WBC: CPT | Performed by: OBSTETRICS & GYNECOLOGY

## 2022-11-04 PROCEDURE — 86850 RBC ANTIBODY SCREEN: CPT | Performed by: OBSTETRICS & GYNECOLOGY

## 2022-11-04 PROCEDURE — 59025 FETAL NON-STRESS TEST: CPT

## 2022-11-04 RX ORDER — SODIUM CHLORIDE 0.9 % (FLUSH) 0.9 %
10 SYRINGE (ML) INJECTION AS NEEDED
Status: DISCONTINUED | OUTPATIENT
Start: 2022-11-04 | End: 2022-11-04 | Stop reason: HOSPADM

## 2022-11-04 RX ORDER — LIDOCAINE HYDROCHLORIDE 10 MG/ML
5 INJECTION, SOLUTION EPIDURAL; INFILTRATION; INTRACAUDAL; PERINEURAL AS NEEDED
Status: DISCONTINUED | OUTPATIENT
Start: 2022-11-04 | End: 2022-11-04 | Stop reason: HOSPADM

## 2022-11-04 RX ORDER — SODIUM CHLORIDE 0.9 % (FLUSH) 0.9 %
3 SYRINGE (ML) INJECTION EVERY 12 HOURS SCHEDULED
Status: DISCONTINUED | OUTPATIENT
Start: 2022-11-04 | End: 2022-11-04 | Stop reason: HOSPADM

## 2022-11-04 RX ADMIN — SODIUM CHLORIDE, POTASSIUM CHLORIDE, SODIUM LACTATE AND CALCIUM CHLORIDE 1000 ML: 600; 310; 30; 20 INJECTION, SOLUTION INTRAVENOUS at 09:47

## 2022-11-04 NOTE — NON STRESS TEST
Daphne Roberts, a  at 37w0d with an DENISE of 2022, by Last Menstrual Period, was seen at [facility] LABOR DELIVERY for a nonstress test.    Chief Complaint   Patient presents with   • Decreased Fetal Movement     Observation; pt sent from the office for DFM and possible contraction. MD called her over stated she has BPP 8/8 and scheduled for c/s at 39 weeks. Pt states that she feels her stomach tightening but cant tell if it is baby or ctx. Denies bleeding or loss of fluid.        Patient Active Problem List   Diagnosis   • Heterozygous MTHFR mutation C677T   • Family history of congenital heart defect   • Mitral regurgitation of mother during pregnancy   • History of placental abruption   • History of 2  sections   • Partial hydatidiform mole   • Endometriosis in scar of skin   • Nausea and vomiting in pregnancy   • Request for sterilization   • Maternal anemia in pregnancy, antepartum   • Pregnancy   • GBS (group B Streptococcus carrier), +RV culture, currently pregnant   • Decreased fetal movements in third trimester       Start Time: 0930  Stop Time: 1130    Interpretation A  Nonstress Test Interpretation A: Reactive

## 2022-11-04 NOTE — PROGRESS NOTES
Patient c/o decreased fetal movement - to L&D   BPP 8/8 today   H/O Abruption   Prior  - repeat and tubal scheduled   Anemia- s/p iron infusions

## 2022-11-04 NOTE — TELEPHONE ENCOUNTER
----- Message from Daphne Roberts sent at 2022 12:37 PM EDT -----  Regarding:    Contact: [phone]  Mentioned changing date to possibly around the . Was wondering what the likely toure of that would be so I can plan ?

## 2022-11-05 ENCOUNTER — HOSPITAL ENCOUNTER (EMERGENCY)
Facility: HOSPITAL | Age: 32
Discharge: HOME | End: 2022-11-05
Attending: OBSTETRICS & GYNECOLOGY | Admitting: OBSTETRICS & GYNECOLOGY

## 2022-11-05 VITALS
BODY MASS INDEX: 26.31 KG/M2 | DIASTOLIC BLOOD PRESSURE: 75 MMHG | RESPIRATION RATE: 16 BRPM | HEART RATE: 82 BPM | HEIGHT: 62 IN | SYSTOLIC BLOOD PRESSURE: 134 MMHG | OXYGEN SATURATION: 100 % | WEIGHT: 143 LBS | TEMPERATURE: 98.6 F

## 2022-11-05 PROCEDURE — 59025 FETAL NON-STRESS TEST: CPT

## 2022-11-05 PROCEDURE — 99283 EMERGENCY DEPT VISIT LOW MDM: CPT | Performed by: OBSTETRICS & GYNECOLOGY

## 2022-11-06 NOTE — NURSING NOTE
Discharge instructions reviewed with pt. And her mother. Pt. To keep her next scheduled OB appointment and to come back if there are any concerns. Pt. Verbalized understanding

## 2022-11-06 NOTE — OBED NOTES
ALPHONSO Note OB        Patient Name: Daphne Roberts  YOB: 1990  MRN: [id number]  Admission Date: 2022  9:45 PM  Date of Service: 2022    Chief Complaint: Decreased Fetal Movement        Subjective     Daphne Roberts is a 32 y.o. female  at 37w1d with Estimated Date of Delivery: 22 who presents with the chief complaint listed above.  She sees Delores Le MD for her prenatal care. Her pregnancy has been complicated by:  GBS carrier, history of  x 2, history fo placental abruption, anemia.    Patient with decreased movement .  BPP done yesterday and .    She describes fetal movement as decreased.  She denies rupture of membranes.  She denies vaginal bleeding. She is not feeling contractions.          Objective   Patient Active Problem List    Diagnosis    • Decreased fetal movements in third trimester [O36.8130]    • GBS (group B Streptococcus carrier), +RV culture, currently pregnant [O99.820]    • Pregnancy [Z34.90]    • Maternal anemia in pregnancy, antepartum [O99.019]    • Request for sterilization [Z30.2]    • Nausea and vomiting in pregnancy [O21.9]    • Endometriosis in scar of skin [N80.6]    • Partial hydatidiform mole [O01.1]    • History of placental abruption [Z87.59]    • History of 2  sections [Z98.891]    • Mitral regurgitation of mother during pregnancy [O99.419, I34.0]    • Family history of congenital heart defect [Z82.79]    • Heterozygous MTHFR mutation C677T [Z15.89]         OB History    Para Term  AB Living   5 2 0 2 2 2   SAB IAB Ectopic Molar Multiple Live Births   2 0 0 0 0 2      # Outcome Date GA Lbr Vishal/2nd Weight Sex Delivery Anes PTL Lv   5 Current            4 SAB  12w0d          3  10/25/17 31w4d  1543 g (3 lb 6.4 oz) F CS-LTranv Gen Y AREN      Birth Comments: Fetal heart tones - 70'-80's - emergent        Name: Jessica      Apgar1: 7  Apgar5: 8   2 SAB 2017  "  36w0d  3147 g (6 lb 15 oz) M CS-LTranv   AREN      Complications: Placental abruption, Couvelaire uterus      Name: Evin      Obstetric Comments   22- IUP at 7-3 weeks - DENISE 22 - [facility]    22 - 20-3 weeks - Normal but Incomplete anatomy - CL - 4.9 cm - F     22 - 24-2 weeks - Normal completed anatomy - F    8/15/22 - 25-3 weeks - At [facility] -EFW 56%, AC 39% - [facility]     22 - 29-3 weeks - EFW 57%, AC 45% - 12 cm - F    10/12/22 - 33-5 - EFW 41%, AC 52% - [facility]         Past Medical History:   Diagnosis Date   • Anemia    • Asthma    • Hx of MTHFR mutation     Single gene mutation with normal homocystine   • mitral valve regurgitation     PER PATIENT. DX AGE 24. PT STATED \"THEY JUST MONITOR IT MAINLY WHEN I AM PREGNANT.\" Notes she has a short P wave   • Placental abruption    • Shortened HI interval     PER PATIENT   • VSD (ventricular septal defect)     congenital       Past Surgical History:   Procedure Laterality Date   •  SECTION     •  SECTION N/A 10/25/2017    Procedure:  SECTION REPEAT;  Surgeon: Delores Le MD;  Location: [facility] LABOR DELIVERY;  Service:    • D & C WITH SUCTION N/A 2021    Procedure: DILATATION AND CURETTAGE WITH SUCTION 10 WEEKS GESTATION;  Surgeon: Delores Le MD;  Location: [facility];  Service: Obstetrics/Gynecology;  Laterality: N/A;  x2   • DILATATION AND CURETTAGE  2017    MAB   • EXCISION MASS TRUNK N/A 2021    Procedure: EXCISION MASS TRUNK;  Surgeon: Delores Le MD;  Location: [facility];  Service: Obstetrics/Gynecology;  Laterality: N/A;       No current facility-administered medications on file prior to encounter.     Current Outpatient Medications on File Prior to Encounter   Medication Sig Dispense Refill   • aspirin (aspirin) 81 MG EC tablet Take 1 tablet by mouth Daily. 90 tablet 3   • ferrous sulfate 325 (65 FE) MG tablet Take 1 tablet by mouth Daily With Breakfast. " 90 tablet 2   • prenatal vitamin (prenatal, CLASSIC, vitamin) tablet Take  by mouth Daily.     • doxylamine (UNISOM) 25 MG tablet Take 1 tablet by mouth At Night As Needed for Sleep. 30 tablet 1   • ondansetron (Zofran) 4 MG tablet Take 1 tablet by mouth Every 6 (Six) Hours As Needed for Nausea or Vomiting. 30 tablet 1   • vitamin B-6 (PYRIDOXINE) 100 MG tablet Take 1 tablet by mouth Daily. 30 tablet 1       No Known Allergies    Family History   Problem Relation Age of Onset   • Mitral valve prolapse Sister    • Pneumonia Sister          age 24   • Arrhythmia Brother         complete heart block/ pacemaker age 4/  age 28   • Bradycardia Brother    • Congenital heart disease Daughter         possible complete heart block/ resolved   • Aortic stenosis Nephew    • Breast cancer Neg Hx    • Ovarian cancer Neg Hx    • Uterine cancer Neg Hx    • Colon cancer Neg Hx    • Malig Hyperthermia Neg Hx        Social History     Socioeconomic History   • Marital status:      Spouse name: MELODY LAWLER   • Number of children: 1   • Years of education: 12   Tobacco Use   • Smoking status: Never   • Smokeless tobacco: Never   Vaping Use   • Vaping Use: Never used   Substance and Sexual Activity   • Alcohol use: Not Currently     Comment: 4 yearly   • Drug use: Never   • Sexual activity: Not Currently     Partners: Male     Birth control/protection: None           Review of Systems   Constitutional: Negative for chills, fatigue and fever.   HENT: Negative for congestion, rhinorrhea and sore throat.    Eyes: Negative for visual disturbance.   Respiratory: Negative.    Cardiovascular: Negative.    Gastrointestinal: Negative for abdominal pain, constipation, diarrhea, nausea and vomiting.   Genitourinary: Negative for difficulty urinating, dyspareunia, dysuria, flank pain, frequency, genital sores, hematuria, pelvic pain, urgency, vaginal bleeding, vaginal discharge and vaginal pain.   Neurological: Negative for dizziness,  "seizures, light-headedness and headaches.   Psychiatric/Behavioral: Negative for sleep disturbance. The patient is not nervous/anxious.           PHYSICAL EXAM:      VITAL SIGNS:  Vitals:    22 221   BP: 134/75   BP Location: Right arm   Patient Position: Lying   Pulse: 82   Resp: 16   Temp: 98.6 °F (37 °C)   TempSrc: Oral   SpO2: 100%   Weight: 64.9 kg (143 lb)   Height: 157.5 cm (62\")        FHT'S:                   Baseline:  135 BPM  Variability:  Moderate = 6 - 25 BPM  Accelerations:  15 x 15 accelerations present     Decelerations:  absent  Contractions:   absent     Interpretation:    Reactive NST, CAT 1 tracing        PHYSICAL EXAM:    General: well developed; well nourished  no acute distress   Heart: Not performed.   Lungs  : breathing is unlabored     Abdomen: soft, non-tender; no masses  no umbilical or inguinal hernias are present  no hepato-splenomegaly       Cervix: was not checked.      Contractions: none        Extremities: peripheral pulses normal, no pedal edema, no clubbing or cyanosis      LABS AND TESTING ORDERED:  1. Uterine and fetal monitoring    LAB RESULTS:    No results found for this or any previous visit (from the past 24 hour(s)).    Lab Results   Component Value Date    ABO A 2022    RH Positive 2022       Lab Results   Component Value Date    STREPGPB Positive (A) 10/26/2022                 Assessment & Plan     ASSESSMENT/PLAN:  Daphne Roberts is a 32 y.o. female  at 37w1d who presented with: decreased fetal movement.  Fetal status reassuring with reactive NST and patient feeling more movement throughout stay.  BPP done yesterday and .  Patient reassured.  Discharged home.          Final Impression:  • Pregnancy at 37w1d  • Reactive NST.  CAT 1 tracing  • Maternal vital signs were reviewed and were unremarkable              Vitals:    22 2212   BP: 134/75   BP Location: Right arm   Patient Position: Lying   Pulse: 82   Resp: 16   Temp: 98.6 " "°F (37 °C)   TempSrc: Oral   SpO2: 100%   Weight: 64.9 kg (143 lb)   Height: 157.5 cm (62\")   •     Lab Results   Component Value Date    STREPGPB Positive (A) 10/26/2022   •   Lab Results   Component Value Date    ABO A 11/04/2022    RH Positive 11/04/2022   •   • COVID - 19 status unknown    PLAN:       I have spent 30 minutes including face to face time with the patient, greater than 50% in discussion of the diagnosis (counseling) and/or coordination of care.     Miley Martin MD  11/5/2022  22:26 EDT  OB Hospitalist  Phone:  [phone]  "

## 2022-11-06 NOTE — NON STRESS TEST
Daphne Roberts, a  at 37w1d with an DENISE of 2022, by Last Menstrual Period, was seen at [facility] OBSTETRIC EMERGENCY DEPARTMENT for a nonstress test.    Chief Complaint   Patient presents with   • Decreased Fetal Movement       Patient Active Problem List   Diagnosis   • Heterozygous MTHFR mutation C677T   • Family history of congenital heart defect   • Mitral regurgitation of mother during pregnancy   • History of placental abruption   • History of 2  sections   • Partial hydatidiform mole   • Endometriosis in scar of skin   • Nausea and vomiting in pregnancy   • Request for sterilization   • Maternal anemia in pregnancy, antepartum   • Pregnancy   • GBS (group B Streptococcus carrier), +RV culture, currently pregnant   • Decreased fetal movements in third trimester       Start Time:   Stop Time:         reactive

## 2022-11-06 NOTE — NURSING NOTE
Dr. Martin at bedside to discuss with pt. That fht's reactive. Pt. Did state that she has felt the baby move since being placed on the monitor.pt. asked to have cervix checked since she lives over an hour away.

## 2022-11-06 NOTE — NURSING NOTE
Pt. Next scheduled OB visit is this Wednesday. Pt. Instructed to keep her next scheduled visit but if there was any concern to definitely come back in to be reevaluated. Pt. And her mother verbalized understanding and agreed with discharge.

## 2022-11-09 ENCOUNTER — HOSPITAL ENCOUNTER (OUTPATIENT)
Facility: HOSPITAL | Age: 32
Discharge: HOME | End: 2022-11-09
Attending: OBSTETRICS & GYNECOLOGY | Admitting: OBSTETRICS & GYNECOLOGY

## 2022-11-09 ENCOUNTER — ROUTINE PRENATAL (OUTPATIENT)
Dept: OBSTETRICS AND GYNECOLOGY | Age: 32
End: 2022-11-09

## 2022-11-09 VITALS — SYSTOLIC BLOOD PRESSURE: 122 MMHG | BODY MASS INDEX: 25.97 KG/M2 | DIASTOLIC BLOOD PRESSURE: 72 MMHG | WEIGHT: 142 LBS

## 2022-11-09 VITALS
RESPIRATION RATE: 16 BRPM | SYSTOLIC BLOOD PRESSURE: 127 MMHG | TEMPERATURE: 98.6 F | HEART RATE: 80 BPM | BODY MASS INDEX: 25.97 KG/M2 | DIASTOLIC BLOOD PRESSURE: 75 MMHG | HEIGHT: 62 IN

## 2022-11-09 DIAGNOSIS — I34.0 MITRAL REGURGITATION OF MOTHER DURING PREGNANCY: ICD-10-CM

## 2022-11-09 DIAGNOSIS — Z13.89 SCREENING FOR HEMATURIA OR PROTEINURIA: ICD-10-CM

## 2022-11-09 DIAGNOSIS — Z30.2 REQUEST FOR STERILIZATION: ICD-10-CM

## 2022-11-09 DIAGNOSIS — Z87.59 HISTORY OF PLACENTAL ABRUPTION: ICD-10-CM

## 2022-11-09 DIAGNOSIS — O99.820 GBS (GROUP B STREPTOCOCCUS CARRIER), +RV CULTURE, CURRENTLY PREGNANT: ICD-10-CM

## 2022-11-09 DIAGNOSIS — Z98.891 HISTORY OF 2 CESAREAN SECTIONS: ICD-10-CM

## 2022-11-09 DIAGNOSIS — O99.419 MITRAL REGURGITATION OF MOTHER DURING PREGNANCY: ICD-10-CM

## 2022-11-09 DIAGNOSIS — Z34.83 PRENATAL CARE, SUBSEQUENT PREGNANCY IN THIRD TRIMESTER: Primary | ICD-10-CM

## 2022-11-09 DIAGNOSIS — O99.019 MATERNAL ANEMIA IN PREGNANCY, ANTEPARTUM: ICD-10-CM

## 2022-11-09 DIAGNOSIS — Z82.79 FAMILY HISTORY OF CONGENITAL HEART DEFECT: ICD-10-CM

## 2022-11-09 PROBLEM — O36.8130 DECREASED FETAL MOVEMENTS IN THIRD TRIMESTER: Status: RESOLVED | Noted: 2022-11-04 | Resolved: 2022-11-09

## 2022-11-09 LAB
GLUCOSE UR STRIP-MCNC: NEGATIVE MG/DL
PROT UR STRIP-MCNC: NEGATIVE MG/DL

## 2022-11-09 PROCEDURE — 0502F SUBSEQUENT PRENATAL CARE: CPT | Performed by: OBSTETRICS & GYNECOLOGY

## 2022-11-09 PROCEDURE — 59025 FETAL NON-STRESS TEST: CPT

## 2022-11-09 PROCEDURE — G0463 HOSPITAL OUTPT CLINIC VISIT: HCPCS

## 2022-11-09 PROCEDURE — 59025 FETAL NON-STRESS TEST: CPT | Performed by: OBSTETRICS & GYNECOLOGY

## 2022-11-09 NOTE — NON STRESS TEST
Daphne Roberts, a  at 37w5d with an DENISE of 2022, by Last Menstrual Period, was seen at [facility] LABOR DELIVERY for a nonstress test.    Chief Complaint   Patient presents with   • Decreased Fetal Movement     ALPHONSO: pt sent from the office for DFM since Friday, continued issue per patient. Denies loss of fluid, ctx and bleeding.        Patient Active Problem List   Diagnosis   • Heterozygous MTHFR mutation C677T   • Family history of congenital heart defect   • Mitral regurgitation of mother during pregnancy   • History of placental abruption   • History of 2  sections   • Partial hydatidiform mole   • Endometriosis in scar of skin   • Nausea and vomiting in pregnancy   • Request for sterilization   • Maternal anemia in pregnancy, antepartum   • Pregnancy   • GBS (group B Streptococcus carrier), +RV culture, currently pregnant       Start Time: 1625  Stop Time: 1724      Interpretation A  Nonstress Test Interpretation A: Reactive

## 2022-11-09 NOTE — PROGRESS NOTES
Patient reports going to L&D Saturday for decreased FM   BPP today 8/8   To L&D for NST   If NST reassuring will proceed with RLTCS tomorrow due to h/o placental abruption and persistent decreased FM   Strict labor warnings/FKC

## 2022-11-10 ENCOUNTER — HOSPITAL ENCOUNTER (INPATIENT)
Facility: HOSPITAL | Age: 32
LOS: 2 days | Discharge: HOME | End: 2022-11-12
Attending: OBSTETRICS & GYNECOLOGY | Admitting: OBSTETRICS & GYNECOLOGY

## 2022-11-10 ENCOUNTER — ANESTHESIA (OUTPATIENT)
Dept: LABOR AND DELIVERY | Facility: HOSPITAL | Age: 32
End: 2022-11-10

## 2022-11-10 ENCOUNTER — ANESTHESIA EVENT (OUTPATIENT)
Dept: LABOR AND DELIVERY | Facility: HOSPITAL | Age: 32
End: 2022-11-10

## 2022-11-10 DIAGNOSIS — Z30.2 REQUEST FOR STERILIZATION: ICD-10-CM

## 2022-11-10 DIAGNOSIS — Z98.891 PREVIOUS CESAREAN SECTION: ICD-10-CM

## 2022-11-10 PROBLEM — O36.8130 DECREASED FETAL MOVEMENTS IN THIRD TRIMESTER: Status: ACTIVE | Noted: 2022-11-10

## 2022-11-10 PROBLEM — O34.219 PREVIOUS CESAREAN DELIVERY AFFECTING PREGNANCY: Status: ACTIVE | Noted: 2022-11-10

## 2022-11-10 PROBLEM — Z90.79 STATUS POST BILATERAL SALPINGECTOMY: Status: ACTIVE | Noted: 2022-11-10

## 2022-11-10 LAB
ABO GROUP BLD: NORMAL
ATMOSPHERIC PRESS: 754.5 MMHG
ATMOSPHERIC PRESS: 754.9 MMHG
BASE EXCESS BLDCOA CALC-SCNC: 2.2 MMOL/L (ref -2–2)
BASE EXCESS BLDCOV CALC-SCNC: 2.1 MMOL/L (ref -30–30)
BDY SITE: ABNORMAL
BDY SITE: ABNORMAL
BLD GP AB SCN SERPL QL: NEGATIVE
COLLECT TME SMN: ABNORMAL
DEPRECATED RDW RBC AUTO: 51.1 FL (ref 37–54)
ERYTHROCYTE [DISTWIDTH] IN BLOOD BY AUTOMATED COUNT: 16.2 % (ref 12.3–15.4)
HCO3 BLDCOA-SCNC: 28.8 MMOL/L (ref 22–28)
HCO3 BLDCOV-SCNC: 26.2 MMOL/L
HCT VFR BLD AUTO: 30 % (ref 34–46.6)
HGB BLD-MCNC: 10.4 G/DL (ref 12–15.9)
MCH RBC QN AUTO: 30 PG (ref 26.6–33)
MCHC RBC AUTO-ENTMCNC: 34.7 G/DL (ref 31.5–35.7)
MCV RBC AUTO: 86.5 FL (ref 79–97)
MODALITY: ABNORMAL
MODALITY: ABNORMAL
NOTE: ABNORMAL
NOTE: ABNORMAL
PCO2 BLDCOA: 50.8 MMHG (ref 43–63)
PCO2 BLDCOV: 38.3 MM HG (ref 35–51.3)
PH BLDCOA: 7.36 PH UNITS (ref 7.18–7.34)
PH BLDCOV: 7.44 PH UNITS (ref 7.26–7.4)
PLATELET # BLD AUTO: 214 10*3/MM3 (ref 140–450)
PMV BLD AUTO: 11.7 FL (ref 6–12)
PO2 BLDCOA: <14.5 MMHG (ref 12–26)
PO2 BLDCOV: 32.5 MM HG (ref 19–39)
RBC # BLD AUTO: 3.47 10*6/MM3 (ref 3.77–5.28)
RH BLD: POSITIVE
SAO2 % BLDCOA: 12.5 % (ref 92–99)
SAO2 % BLDCOA: 65.3 % (ref 92–99)
SAO2 % BLDCOV: ABNORMAL %
T&S EXPIRATION DATE: NORMAL
TOTAL RATE: 16 BREATHS/MINUTE
TOTAL RATE: 18 BREATHS/MINUTE
WBC NRBC COR # BLD: 7.53 10*3/MM3 (ref 3.4–10.8)

## 2022-11-10 PROCEDURE — 25010000002 KETOROLAC TROMETHAMINE PER 15 MG: Performed by: OBSTETRICS & GYNECOLOGY

## 2022-11-10 PROCEDURE — 25010000002 MORPHINE PER 10 MG: Performed by: ANESTHESIOLOGY

## 2022-11-10 PROCEDURE — 25010000002 METHYLERGONOVINE MALEATE PER 0.2 MG: Performed by: OBSTETRICS & GYNECOLOGY

## 2022-11-10 PROCEDURE — 25010000002 HYDROMORPHONE PER 4 MG: Performed by: NURSE ANESTHETIST, CERTIFIED REGISTERED

## 2022-11-10 PROCEDURE — 63710000001 DIPHENHYDRAMINE PER 50 MG: Performed by: OBSTETRICS & GYNECOLOGY

## 2022-11-10 PROCEDURE — 25010000002 ONDANSETRON PER 1 MG: Performed by: ANESTHESIOLOGY

## 2022-11-10 PROCEDURE — 0UB70ZZ EXCISION OF BILATERAL FALLOPIAN TUBES, OPEN APPROACH: ICD-10-PCS

## 2022-11-10 PROCEDURE — 86900 BLOOD TYPING SEROLOGIC ABO: CPT | Performed by: OBSTETRICS & GYNECOLOGY

## 2022-11-10 PROCEDURE — 88307 TISSUE EXAM BY PATHOLOGIST: CPT

## 2022-11-10 PROCEDURE — 58611 LIGATE OVIDUCT(S) ADD-ON: CPT | Performed by: OBSTETRICS & GYNECOLOGY

## 2022-11-10 PROCEDURE — 86901 BLOOD TYPING SEROLOGIC RH(D): CPT | Performed by: OBSTETRICS & GYNECOLOGY

## 2022-11-10 PROCEDURE — 25010000002 CEFAZOLIN IN DEXTROSE 2-4 GM/100ML-% SOLUTION: Performed by: OBSTETRICS & GYNECOLOGY

## 2022-11-10 PROCEDURE — S0260 H&P FOR SURGERY: HCPCS | Performed by: OBSTETRICS & GYNECOLOGY

## 2022-11-10 PROCEDURE — 85027 COMPLETE CBC AUTOMATED: CPT | Performed by: OBSTETRICS & GYNECOLOGY

## 2022-11-10 PROCEDURE — 25010000002 FENTANYL CITRATE (PF) 50 MCG/ML SOLUTION: Performed by: ANESTHESIOLOGY

## 2022-11-10 PROCEDURE — 82803 BLOOD GASES ANY COMBINATION: CPT

## 2022-11-10 PROCEDURE — 86850 RBC ANTIBODY SCREEN: CPT | Performed by: OBSTETRICS & GYNECOLOGY

## 2022-11-10 PROCEDURE — 88302 TISSUE EXAM BY PATHOLOGIST: CPT | Performed by: OBSTETRICS & GYNECOLOGY

## 2022-11-10 PROCEDURE — 59510 CESAREAN DELIVERY: CPT | Performed by: OBSTETRICS & GYNECOLOGY

## 2022-11-10 RX ORDER — CARBOPROST TROMETHAMINE 250 UG/ML
250 INJECTION, SOLUTION INTRAMUSCULAR AS NEEDED
Status: DISCONTINUED | OUTPATIENT
Start: 2022-11-10 | End: 2022-11-10 | Stop reason: HOSPADM

## 2022-11-10 RX ORDER — NALOXONE HCL 0.4 MG/ML
0.2 VIAL (ML) INJECTION AS NEEDED
Status: DISCONTINUED | OUTPATIENT
Start: 2022-11-10 | End: 2022-11-12 | Stop reason: HOSPADM

## 2022-11-10 RX ORDER — OXYCODONE HYDROCHLORIDE 10 MG/1
10 TABLET ORAL EVERY 4 HOURS PRN
Status: DISCONTINUED | OUTPATIENT
Start: 2022-11-10 | End: 2022-11-12 | Stop reason: HOSPADM

## 2022-11-10 RX ORDER — MORPHINE SULFATE 1 MG/ML
INJECTION, SOLUTION EPIDURAL; INTRATHECAL; INTRAVENOUS
Status: COMPLETED | OUTPATIENT
Start: 2022-11-10 | End: 2022-11-10

## 2022-11-10 RX ORDER — HYDROCODONE BITARTRATE AND ACETAMINOPHEN 7.5; 325 MG/1; MG/1
1 TABLET ORAL ONCE AS NEEDED
Status: DISCONTINUED | OUTPATIENT
Start: 2022-11-10 | End: 2022-11-12 | Stop reason: HOSPADM

## 2022-11-10 RX ORDER — ACETAMINOPHEN 500 MG
1000 TABLET ORAL EVERY 6 HOURS PRN
COMMUNITY
End: 2022-11-12

## 2022-11-10 RX ORDER — ALUMINA, MAGNESIA, AND SIMETHICONE 2400; 2400; 240 MG/30ML; MG/30ML; MG/30ML
15 SUSPENSION ORAL EVERY 4 HOURS PRN
Status: DISCONTINUED | OUTPATIENT
Start: 2022-11-10 | End: 2022-11-12 | Stop reason: HOSPADM

## 2022-11-10 RX ORDER — SODIUM CHLORIDE 0.9 % (FLUSH) 0.9 %
10 SYRINGE (ML) INJECTION AS NEEDED
Status: DISCONTINUED | OUTPATIENT
Start: 2022-11-10 | End: 2022-11-12 | Stop reason: HOSPADM

## 2022-11-10 RX ORDER — PROMETHAZINE HYDROCHLORIDE 25 MG/1
25 SUPPOSITORY RECTAL ONCE AS NEEDED
Status: DISCONTINUED | OUTPATIENT
Start: 2022-11-10 | End: 2022-11-12 | Stop reason: HOSPADM

## 2022-11-10 RX ORDER — ACETAMINOPHEN 500 MG
1000 TABLET ORAL ONCE
Status: COMPLETED | OUTPATIENT
Start: 2022-11-10 | End: 2022-11-10

## 2022-11-10 RX ORDER — ONDANSETRON 4 MG/1
4 TABLET, FILM COATED ORAL EVERY 8 HOURS PRN
Status: DISCONTINUED | OUTPATIENT
Start: 2022-11-10 | End: 2022-11-12 | Stop reason: HOSPADM

## 2022-11-10 RX ORDER — AMOXICILLIN 250 MG
1 CAPSULE ORAL 2 TIMES DAILY
Status: DISCONTINUED | OUTPATIENT
Start: 2022-11-10 | End: 2022-11-12 | Stop reason: HOSPADM

## 2022-11-10 RX ORDER — OXYCODONE HYDROCHLORIDE 5 MG/1
5 TABLET ORAL EVERY 4 HOURS PRN
Status: DISCONTINUED | OUTPATIENT
Start: 2022-11-10 | End: 2022-11-12 | Stop reason: HOSPADM

## 2022-11-10 RX ORDER — PHYTONADIONE 1 MG/.5ML
INJECTION, EMULSION INTRAMUSCULAR; INTRAVENOUS; SUBCUTANEOUS
Status: DISPENSED
Start: 2022-11-10 | End: 2022-11-10

## 2022-11-10 RX ORDER — ONDANSETRON 2 MG/ML
4 INJECTION INTRAMUSCULAR; INTRAVENOUS EVERY 6 HOURS PRN
Status: DISCONTINUED | OUTPATIENT
Start: 2022-11-10 | End: 2022-11-10 | Stop reason: HOSPADM

## 2022-11-10 RX ORDER — ACETAMINOPHEN 500 MG
1000 TABLET ORAL ONCE
Status: DISCONTINUED | OUTPATIENT
Start: 2022-11-10 | End: 2022-11-10 | Stop reason: HOSPADM

## 2022-11-10 RX ORDER — EPHEDRINE SULFATE 50 MG/ML
5 INJECTION, SOLUTION INTRAVENOUS ONCE AS NEEDED
Status: DISCONTINUED | OUTPATIENT
Start: 2022-11-10 | End: 2022-11-12 | Stop reason: HOSPADM

## 2022-11-10 RX ORDER — SODIUM CHLORIDE 0.9 % (FLUSH) 0.9 %
10 SYRINGE (ML) INJECTION EVERY 12 HOURS SCHEDULED
Status: DISCONTINUED | OUTPATIENT
Start: 2022-11-10 | End: 2022-11-10

## 2022-11-10 RX ORDER — PROMETHAZINE HYDROCHLORIDE 25 MG/1
25 TABLET ORAL ONCE AS NEEDED
Status: DISCONTINUED | OUTPATIENT
Start: 2022-11-10 | End: 2022-11-12 | Stop reason: HOSPADM

## 2022-11-10 RX ORDER — OXYTOCIN/0.9 % SODIUM CHLORIDE 30/500 ML
250 PLASTIC BAG, INJECTION (ML) INTRAVENOUS CONTINUOUS PRN
Status: ACTIVE | OUTPATIENT
Start: 2022-11-10 | End: 2022-11-10

## 2022-11-10 RX ORDER — BUPIVACAINE HYDROCHLORIDE 7.5 MG/ML
INJECTION, SOLUTION EPIDURAL; RETROBULBAR
Status: COMPLETED | OUTPATIENT
Start: 2022-11-10 | End: 2022-11-10

## 2022-11-10 RX ORDER — DIPHENHYDRAMINE HCL 25 MG
25 CAPSULE ORAL
Status: DISCONTINUED | OUTPATIENT
Start: 2022-11-10 | End: 2022-11-12 | Stop reason: HOSPADM

## 2022-11-10 RX ORDER — ONDANSETRON 2 MG/ML
4 INJECTION INTRAMUSCULAR; INTRAVENOUS ONCE AS NEEDED
Status: COMPLETED | OUTPATIENT
Start: 2022-11-10 | End: 2022-11-10

## 2022-11-10 RX ORDER — OXYTOCIN/0.9 % SODIUM CHLORIDE 30/500 ML
125 PLASTIC BAG, INJECTION (ML) INTRAVENOUS CONTINUOUS PRN
Status: COMPLETED | OUTPATIENT
Start: 2022-11-10 | End: 2022-11-10

## 2022-11-10 RX ORDER — CEFAZOLIN SODIUM 2 G/100ML
2 INJECTION, SOLUTION INTRAVENOUS ONCE
Status: COMPLETED | OUTPATIENT
Start: 2022-11-10 | End: 2022-11-10

## 2022-11-10 RX ORDER — KETOROLAC TROMETHAMINE 15 MG/ML
15 INJECTION, SOLUTION INTRAMUSCULAR; INTRAVENOUS EVERY 6 HOURS
Status: DISPENSED | OUTPATIENT
Start: 2022-11-10 | End: 2022-11-11

## 2022-11-10 RX ORDER — METHYLERGONOVINE MALEATE 0.2 MG/ML
200 INJECTION INTRAVENOUS AS NEEDED
Status: DISCONTINUED | OUTPATIENT
Start: 2022-11-10 | End: 2022-11-10 | Stop reason: HOSPADM

## 2022-11-10 RX ORDER — ONDANSETRON 2 MG/ML
4 INJECTION INTRAMUSCULAR; INTRAVENOUS ONCE AS NEEDED
Status: DISCONTINUED | OUTPATIENT
Start: 2022-11-10 | End: 2022-11-12 | Stop reason: HOSPADM

## 2022-11-10 RX ORDER — SODIUM CHLORIDE 0.9 % (FLUSH) 0.9 %
3 SYRINGE (ML) INJECTION EVERY 12 HOURS SCHEDULED
Status: DISCONTINUED | OUTPATIENT
Start: 2022-11-10 | End: 2022-11-12 | Stop reason: HOSPADM

## 2022-11-10 RX ORDER — FENTANYL CITRATE 50 UG/ML
INJECTION, SOLUTION INTRAMUSCULAR; INTRAVENOUS
Status: COMPLETED | OUTPATIENT
Start: 2022-11-10 | End: 2022-11-10

## 2022-11-10 RX ORDER — OXYCODONE AND ACETAMINOPHEN 7.5; 325 MG/1; MG/1
1 TABLET ORAL EVERY 4 HOURS PRN
Status: DISCONTINUED | OUTPATIENT
Start: 2022-11-10 | End: 2022-11-12 | Stop reason: HOSPADM

## 2022-11-10 RX ORDER — LIDOCAINE HYDROCHLORIDE 10 MG/ML
5 INJECTION, SOLUTION EPIDURAL; INFILTRATION; INTRACAUDAL; PERINEURAL AS NEEDED
Status: DISCONTINUED | OUTPATIENT
Start: 2022-11-10 | End: 2022-11-10 | Stop reason: HOSPADM

## 2022-11-10 RX ORDER — PRENATAL VIT/IRON FUM/FOLIC AC 27MG-0.8MG
1 TABLET ORAL DAILY
Status: DISCONTINUED | OUTPATIENT
Start: 2022-11-10 | End: 2022-11-12 | Stop reason: HOSPADM

## 2022-11-10 RX ORDER — ERYTHROMYCIN 5 MG/G
OINTMENT OPHTHALMIC
Status: DISPENSED
Start: 2022-11-10 | End: 2022-11-10

## 2022-11-10 RX ORDER — DIPHENHYDRAMINE HCL 25 MG
25 CAPSULE ORAL EVERY 4 HOURS PRN
Status: DISCONTINUED | OUTPATIENT
Start: 2022-11-10 | End: 2022-11-12 | Stop reason: HOSPADM

## 2022-11-10 RX ORDER — FAMOTIDINE 10 MG/ML
20 INJECTION, SOLUTION INTRAVENOUS ONCE AS NEEDED
Status: COMPLETED | OUTPATIENT
Start: 2022-11-10 | End: 2022-11-10

## 2022-11-10 RX ORDER — BUPIVACAINE HCL/0.9 % NACL/PF 0.125 %
PLASTIC BAG, INJECTION (ML) EPIDURAL AS NEEDED
Status: DISCONTINUED | OUTPATIENT
Start: 2022-11-10 | End: 2022-11-10 | Stop reason: SURG

## 2022-11-10 RX ORDER — LIDOCAINE HYDROCHLORIDE 10 MG/ML
5 INJECTION, SOLUTION EPIDURAL; INFILTRATION; INTRACAUDAL; PERINEURAL AS NEEDED
Status: DISCONTINUED | OUTPATIENT
Start: 2022-11-10 | End: 2022-11-10

## 2022-11-10 RX ORDER — ACETAMINOPHEN 500 MG
1000 TABLET ORAL EVERY 6 HOURS
Status: COMPLETED | OUTPATIENT
Start: 2022-11-10 | End: 2022-11-11

## 2022-11-10 RX ORDER — PROMETHAZINE HYDROCHLORIDE 12.5 MG/1
12.5 TABLET ORAL EVERY 4 HOURS PRN
Status: DISCONTINUED | OUTPATIENT
Start: 2022-11-10 | End: 2022-11-12 | Stop reason: HOSPADM

## 2022-11-10 RX ORDER — SIMETHICONE 80 MG
80 TABLET,CHEWABLE ORAL 4 TIMES DAILY PRN
Status: DISCONTINUED | OUTPATIENT
Start: 2022-11-10 | End: 2022-11-12 | Stop reason: HOSPADM

## 2022-11-10 RX ORDER — ACETAMINOPHEN 325 MG/1
650 TABLET ORAL EVERY 6 HOURS
Status: DISCONTINUED | OUTPATIENT
Start: 2022-11-11 | End: 2022-11-12 | Stop reason: HOSPADM

## 2022-11-10 RX ORDER — IBUPROFEN 600 MG/1
600 TABLET ORAL EVERY 6 HOURS
Status: DISCONTINUED | OUTPATIENT
Start: 2022-11-11 | End: 2022-11-12 | Stop reason: HOSPADM

## 2022-11-10 RX ORDER — OXYTOCIN/0.9 % SODIUM CHLORIDE 30/500 ML
999 PLASTIC BAG, INJECTION (ML) INTRAVENOUS ONCE
Status: COMPLETED | OUTPATIENT
Start: 2022-11-10 | End: 2022-11-10

## 2022-11-10 RX ORDER — FENTANYL CITRATE 50 UG/ML
50 INJECTION, SOLUTION INTRAMUSCULAR; INTRAVENOUS
Status: DISCONTINUED | OUTPATIENT
Start: 2022-11-10 | End: 2022-11-12 | Stop reason: HOSPADM

## 2022-11-10 RX ORDER — SODIUM CHLORIDE, SODIUM LACTATE, POTASSIUM CHLORIDE, CALCIUM CHLORIDE 600; 310; 30; 20 MG/100ML; MG/100ML; MG/100ML; MG/100ML
125 INJECTION, SOLUTION INTRAVENOUS CONTINUOUS
Status: DISCONTINUED | OUTPATIENT
Start: 2022-11-10 | End: 2022-11-10

## 2022-11-10 RX ORDER — ONDANSETRON 4 MG/1
4 TABLET, FILM COATED ORAL EVERY 6 HOURS PRN
Status: DISCONTINUED | OUTPATIENT
Start: 2022-11-10 | End: 2022-11-10 | Stop reason: HOSPADM

## 2022-11-10 RX ORDER — TRISODIUM CITRATE DIHYDRATE AND CITRIC ACID MONOHYDRATE 500; 334 MG/5ML; MG/5ML
30 SOLUTION ORAL ONCE
Status: COMPLETED | OUTPATIENT
Start: 2022-11-10 | End: 2022-11-10

## 2022-11-10 RX ORDER — HYDROMORPHONE HYDROCHLORIDE 1 MG/ML
0.5 INJECTION, SOLUTION INTRAMUSCULAR; INTRAVENOUS; SUBCUTANEOUS
Status: DISCONTINUED | OUTPATIENT
Start: 2022-11-10 | End: 2022-11-12 | Stop reason: HOSPADM

## 2022-11-10 RX ORDER — CALCIUM CARBONATE 200(500)MG
1 TABLET,CHEWABLE ORAL EVERY 4 HOURS PRN
Status: DISCONTINUED | OUTPATIENT
Start: 2022-11-10 | End: 2022-11-12 | Stop reason: HOSPADM

## 2022-11-10 RX ORDER — SODIUM CHLORIDE 0.9 % (FLUSH) 0.9 %
10 SYRINGE (ML) INJECTION AS NEEDED
Status: DISCONTINUED | OUTPATIENT
Start: 2022-11-10 | End: 2022-11-10

## 2022-11-10 RX ORDER — ONDANSETRON 2 MG/ML
4 INJECTION INTRAMUSCULAR; INTRAVENOUS EVERY 6 HOURS PRN
Status: DISCONTINUED | OUTPATIENT
Start: 2022-11-10 | End: 2022-11-12 | Stop reason: HOSPADM

## 2022-11-10 RX ORDER — KETOROLAC TROMETHAMINE 30 MG/ML
30 INJECTION, SOLUTION INTRAMUSCULAR; INTRAVENOUS ONCE
Status: COMPLETED | OUTPATIENT
Start: 2022-11-10 | End: 2022-11-10

## 2022-11-10 RX ORDER — DIPHENHYDRAMINE HYDROCHLORIDE 50 MG/ML
12.5 INJECTION INTRAMUSCULAR; INTRAVENOUS
Status: DISCONTINUED | OUTPATIENT
Start: 2022-11-10 | End: 2022-11-12 | Stop reason: HOSPADM

## 2022-11-10 RX ADMIN — ACETAMINOPHEN 1000 MG: 500 TABLET ORAL at 20:04

## 2022-11-10 RX ADMIN — FAMOTIDINE 20 MG: 10 INJECTION INTRAVENOUS at 07:22

## 2022-11-10 RX ADMIN — ACETAMINOPHEN 1000 MG: 500 TABLET ORAL at 13:44

## 2022-11-10 RX ADMIN — HYDROMORPHONE HYDROCHLORIDE 0.5 MG: 1 INJECTION, SOLUTION INTRAMUSCULAR; INTRAVENOUS; SUBCUTANEOUS at 10:34

## 2022-11-10 RX ADMIN — ONDANSETRON 4 MG: 2 INJECTION INTRAMUSCULAR; INTRAVENOUS at 07:22

## 2022-11-10 RX ADMIN — SODIUM CHLORIDE, POTASSIUM CHLORIDE, SODIUM LACTATE AND CALCIUM CHLORIDE 125 ML/HR: 600; 310; 30; 20 INJECTION, SOLUTION INTRAVENOUS at 06:42

## 2022-11-10 RX ADMIN — FENTANYL CITRATE 10 MCG: 0.05 INJECTION, SOLUTION INTRAMUSCULAR; INTRAVENOUS at 07:47

## 2022-11-10 RX ADMIN — KETOROLAC TROMETHAMINE 15 MG: 15 INJECTION, SOLUTION INTRAMUSCULAR; INTRAVENOUS at 22:03

## 2022-11-10 RX ADMIN — SODIUM CITRATE AND CITRIC ACID MONOHYDRATE 30 ML: 500; 334 SOLUTION ORAL at 07:22

## 2022-11-10 RX ADMIN — Medication 100 MCG: at 08:20

## 2022-11-10 RX ADMIN — SODIUM CHLORIDE, POTASSIUM CHLORIDE, SODIUM LACTATE AND CALCIUM CHLORIDE 1000 ML: 600; 310; 30; 20 INJECTION, SOLUTION INTRAVENOUS at 05:44

## 2022-11-10 RX ADMIN — KETOROLAC TROMETHAMINE 30 MG: 30 INJECTION, SOLUTION INTRAMUSCULAR; INTRAVENOUS at 08:43

## 2022-11-10 RX ADMIN — MORPHINE SULFATE 100 MCG: 1 INJECTION, SOLUTION EPIDURAL; INTRATHECAL; INTRAVENOUS at 07:47

## 2022-11-10 RX ADMIN — KETOROLAC TROMETHAMINE 15 MG: 15 INJECTION, SOLUTION INTRAMUSCULAR; INTRAVENOUS at 15:51

## 2022-11-10 RX ADMIN — Medication 999 ML/HR: at 08:07

## 2022-11-10 RX ADMIN — Medication 50 MCG: at 08:10

## 2022-11-10 RX ADMIN — DIPHENHYDRAMINE HYDROCHLORIDE 25 MG: 25 CAPSULE ORAL at 11:47

## 2022-11-10 RX ADMIN — DOCUSATE SODIUM 50MG AND SENNOSIDES 8.6MG 1 TABLET: 8.6; 5 TABLET, FILM COATED ORAL at 20:04

## 2022-11-10 RX ADMIN — Medication 50 MCG: at 07:51

## 2022-11-10 RX ADMIN — ACETAMINOPHEN 1000 MG: 500 TABLET ORAL at 06:46

## 2022-11-10 RX ADMIN — Medication 50 MCG: at 07:57

## 2022-11-10 RX ADMIN — BUPIVACAINE HYDROCHLORIDE 1.4 ML: 7.5 INJECTION, SOLUTION EPIDURAL; RETROBULBAR at 07:47

## 2022-11-10 RX ADMIN — METHYLERGONOVINE MALEATE 200 MCG: 0.2 INJECTION INTRAVENOUS at 08:08

## 2022-11-10 RX ADMIN — Medication 125 ML/HR: at 09:34

## 2022-11-10 RX ADMIN — CEFAZOLIN SODIUM 2 G: 2 INJECTION, SOLUTION INTRAVENOUS at 07:38

## 2022-11-10 NOTE — ANESTHESIA POSTPROCEDURE EVALUATION
Patient: Daphne Roberts    Procedure Summary     Date: 11/10/22 Room / Location:  [facility]OR DELIV[facility] 3 /  [facility]IV[facility]    Anesthesia Start: 740 Anesthesia Stop: 847    Procedure:  SECTION REPEAT WITH TUBAL (Abdomen) Diagnosis:     Surgeons: Delores Le MD Provider: Lola Fletcher MD    Anesthesia Type: spinal ASA Status: 2          Anesthesia Type: spinal    Vitals  Vitals Value Taken Time   /77 11/10/22 1130   Temp 36.8 °C (98.2 °F) 11/10/22 1130   Pulse 83 11/10/22 1130   Resp 16 11/10/22 1130   SpO2 96 % 11/10/22 1130           Post Anesthesia Care and Evaluation    Patient location during evaluation: bedside  Patient participation: complete - patient participated  Level of consciousness: awake and alert  Pain management: adequate    Airway patency: patent  Anesthetic complications: No anesthetic complications  PONV Status: controlled  Cardiovascular status: acceptable  Respiratory status: acceptable  Hydration status: acceptable

## 2022-11-10 NOTE — PLAN OF CARE
Problem: Adult Inpatient Plan of Care  Goal: Plan of Care Review  Outcome: Ongoing, Progressing  Flowsheets (Taken 11/10/2022 0913)  Progress: improving  Plan of Care Reviewed With:   patient   spouse  Outcome Evaluation: s/p c section with bilateral tubal ligation, bleeding and pain controlled.  Goal: Patient-Specific Goal (Individualized)  Outcome: Ongoing, Progressing  Goal: Absence of Hospital-Acquired Illness or Injury  Outcome: Ongoing, Progressing  Intervention: Identify and Manage Fall Risk  Recent Flowsheet Documentation  Taken 11/10/2022 0900 by Ramonita Redd RN  Safety Promotion/Fall Prevention: safety round/check completed  Taken 11/10/2022 0620 by Ramonita Redd RN  Safety Promotion/Fall Prevention: safety round/check completed  Intervention: Prevent and Manage VTE (Venous Thromboembolism) Risk  Recent Flowsheet Documentation  Taken 11/10/2022 0845 by Ramonita Redd RN  VTE Prevention/Management:   sequential compression devices on   bilateral  Goal: Optimal Comfort and Wellbeing  Outcome: Ongoing, Progressing  Intervention: Provide Person-Centered Care  Recent Flowsheet Documentation  Taken 11/10/2022 0900 by Ramonita Redd RN  Trust Relationship/Rapport:   care explained   choices provided   emotional support provided   empathic listening provided   questions answered   questions encouraged   reassurance provided   thoughts/feelings acknowledged  Goal: Readiness for Transition of Care  Outcome: Ongoing, Progressing     Problem: Skin Injury Risk Increased  Goal: Skin Health and Integrity  Outcome: Ongoing, Progressing     Problem:  Fall Injury Risk  Goal: Absence of Fall, Infant Drop and Related Injury  Outcome: Ongoing, Progressing  Intervention: Promote Injury-Free Environment  Recent Flowsheet Documentation  Taken 11/10/2022 0900 by Ramonita Redd RN  Safety Promotion/Fall Prevention: safety round/check completed  Taken 11/10/2022 0620 by Ramonita Redd RN  Safety Promotion/Fall  Prevention: safety round/check completed     Problem: Device-Related Complication Risk (Anesthesia/Analgesia, Neuraxial)  Goal: Safe Infusion Delivery Completion  Outcome: Ongoing, Progressing     Problem: Infection (Anesthesia/Analgesia, Neuraxial)  Goal: Absence of Infection Signs and Symptoms  Outcome: Ongoing, Progressing     Problem: Nausea and Vomiting (Anesthesia/Analgesia, Neuraxial)  Goal: Nausea and Vomiting Relief  Outcome: Ongoing, Progressing     Problem: Pain (Anesthesia/Analgesia, Neuraxial)  Goal: Effective Pain Control  Outcome: Ongoing, Progressing     Problem: Respiratory Compromise (Anesthesia/Analgesia, Neuraxial)  Goal: Effective Oxygenation and Ventilation  Outcome: Ongoing, Progressing     Problem: Sensorimotor Impairment (Anesthesia/Analgesia, Neuraxial)  Goal: Baseline Motor Function  Outcome: Ongoing, Progressing  Intervention: Optimize Sensorimotor Function  Recent Flowsheet Documentation  Taken 11/10/2022 0900 by Ramonita Redd RN  Safety Promotion/Fall Prevention: safety round/check completed  Taken 11/10/2022 0620 by Ramonita Redd, JILLIAN  Safety Promotion/Fall Prevention: safety round/check completed     Problem: Urinary Retention (Anesthesia/Analgesia, Neuraxial)  Goal: Effective Urinary Elimination  Outcome: Ongoing, Progressing   Goal Outcome Evaluation:  Plan of Care Reviewed With: patient, spouse        Progress: improving  Outcome Evaluation: s/p c section with bilateral tubal ligation, bleeding and pain controlled.

## 2022-11-10 NOTE — H&P
[facility]  Obstetric History and Physical    Chief Complaint   Patient presents with   • Scheduled      For decreased fetal movement. Pt denies LOF, VB, or ctxs       Subjective     Patient is a 32 y.o. female  currently at 37w6d, who presents for repeat  section due to persistent decreased fetal movement and history of placental abruption. Patient denies loss of fluid or vaginal bleeding.     Her prenatal care is complicated by  GBS +, prior   desires repeat  and desires sterlization  valid consents currently available.  Her previous obstetric/gynecological history is noted for is remarkable for h/o placental abruption. .    The following portions of the patients history were reviewed and updated as appropriate: current medications, allergies, past medical history, past surgical history, past family history, past social history and problem list .       Prenatal Information:  Prenatal Results     POC Urine Glucose/Protein     Test Value Reference Range Date Time    Urine Glucose  Negative mg/dL Negative 22 1533    Urine Protein  Negative mg/dL Negative 22 1533          Initial Prenatal Labs     Test Value Reference Range Date Time    Hemoglobin  12.1 g/dL 11.1 - 15.9 22 0954    Hematocrit  35.7 % 34.0 - 46.6 22 0954    Platelets  258 x10E3/uL 150 - 450 22 0954    Rubella IgG  1.04 index Immune >0.99 22 0954    Hepatitis B SAg  Negative  Negative 22 0954    Hepatitis C Ab  0.1 s/co ratio 0.0 - 0.9 22 0954    RPR  Non Reactive  Non Reactive 22 0954    ABO  A   22 0948    Rh  Positive   22 0948    Antibody Screen  Negative  Negative 22 0954    HIV  Non Reactive  Non Reactive 22 0954    Urine Culture  Final report   22 0954    Gonorrhea  Negative  Negative 22 1042    Chlamydia  Negative  Negative 22 1042    TSH  1.410 uIU/mL 0.450 - 4.500 22 1149    HgB A1c               2nd  and 3rd Trimester     Test Value Reference Range Date Time    Hemoglobin (repeated)  10.4 g/dL 12.0 - 15.9 11/10/22 0611       11.4 g/dL 12.0 - 15.9 11/04/22 0948       9.7 g/dL 12.0 - 15.9 10/03/22 1406       9.9 g/dL 11.1 - 15.9 08/29/22 1149    Hematocrit (repeated)  30.0 % 34.0 - 46.6 11/10/22 0611       33.6 % 34.0 - 46.6 11/04/22 0948       28.6 % 34.0 - 46.6 10/03/22 1406       30.3 % 34.0 - 46.6 08/29/22 1149    Platelets   214 10*3/mm3 140 - 450 11/10/22 0611       230 10*3/mm3 140 - 450 11/04/22 0948       249 10*3/mm3 140 - 450 10/03/22 1406       258 x10E3/uL 150 - 450 04/11/22 0954    GCT  108 mg/dL 65 - 139 08/29/22 1149    Antibody Screen (repeated)  Negative   11/04/22 0948    GTT Fasting        GTT 1 Hr        GTT 2 Hr        GTT 3 Hr        Group B Strep  Positive  Negative 10/26/22 0947          Drug Screening     Test Value Reference Range Date Time    Amphetamine Screen        Barbiturate Screen        Benzodiazepine Screen        Methadone Screen        Phencyclidine Screen        Opiates Screen        THC Screen        Cocaine Screen        Propoxyphene Screen        Buprenorphine Screen        Methamphetamine Screen        Oxycodone Screen        Tricyclic Antidepressants Screen              Other (Risk screening)     Test Value Reference Range Date Time    Varicella IgG  1,069 index Immune >165 04/11/22 0954    Parvovirus IgG        CMV IgG        Cystic Fibrosis        Hemoglobin electrophoresis        NIPT        MSAFP-4        AFP (for NTD only)              Legend    ^: Historical                      External Prenatal Results     Pregnancy Outside Results - Transcribed From Office Records - See Scanned Records For Details     Test Value Date Time    ABO  A  11/04/22 0948    Rh  Positive  11/04/22 0948    Antibody Screen  Negative  11/04/22 0948       Negative  04/11/22 0954    Varicella IgG  1,069 index 04/11/22 0954    Rubella  1.04 index 04/11/22 0954    Hgb  10.4 g/dL 11/10/22 0611        11.4 g/dL 22 0948       9.7 g/dL 10/03/22 1406       9.9 g/dL 22 1149       12.1 g/dL 22 0954    Hct  30.0 % 11/10/22 0611       33.6 % 22 0948       28.6 % 10/03/22 1406       30.3 % 22 1149       35.7 % 22 0954    Glucose Fasting GTT       Glucose Tolerance Test 1 hour       Glucose Tolerance Test 3 hour       Gonorrhea (discrete)  Negative  22 1042    Chlamydia (discrete)  Negative  22 1042    RPR  Non Reactive  22 0954    VDRL       Syphilis Antibody       HBsAg  Negative  22 0954    Herpes Simplex Virus PCR       Herpes Simplex VIrus Culture       HIV  Non Reactive  22 0954    Hep C RNA Quant PCR       Hep C Antibody  0.1 s/co ratio 22 0954    AFP       Group B Strep  Positive  10/26/22 0947    GBS Susceptibility to Clindamycin       GBS Susceptibility to Erythromycin       Fetal Fibronectin       Genetic Testing, Maternal Blood ^ declined  17           Drug Screening     Test Value Date Time    Urine Drug Screen       Amphetamine Screen       Barbiturate Screen       Benzodiazepine Screen       Methadone Screen       Phencyclidine Screen       Opiates Screen       THC Screen       Cocaine Screen       Propoxyphene Screen       Buprenorphine Screen       Methamphetamine Screen       Oxycodone Screen       Tricyclic Antidepressants Screen             Legend    ^: Historical                         Past OB History:     OB History    Para Term  AB Living   5 2 0 2 2 2   SAB IAB Ectopic Molar Multiple Live Births   2 0 0 0 0 2      # Outcome Date GA Lbr Vishal/2nd Weight Sex Delivery Anes PTL Lv   5 Current            4 2021 12w0d          3  10/25/17 31w4d  1543 g (3 lb 6.4 oz) F CS-LTranv Gen Y AREN      Birth Comments: Fetal heart tones - 70'-80's - emergent        Name: Jessica      Apgar1: 7  Apgar5: 8   2 2017           1   36w0d  3147 g (6 lb 15 oz) M CS-LTranv   AREN       "Complications: Placental abruption, Couvelaire uterus      Name: Evin      Obstetric Comments   22- IUP at 7-3 weeks - DENISE 22 - F    22 - 20-3 weeks - Normal but Incomplete anatomy - CL - 4.9 cm - F     22 - 24-2 weeks - Normal completed anatomy - F    8/15/22 - 25-3 weeks - At [facility] -EFW 56%, AC 39% - F     22 - 29-3 weeks - EFW 57%, AC 45% - 12 cm - F    10/12/22 - 33-5 - EFW 41%, AC 52% - [facility]        Past Medical History: Past Medical History:   Diagnosis Date   • Anemia    • Asthma    • Hx of MTHFR mutation     Single gene mutation with normal homocystine   • mitral valve regurgitation     PER PATIENT. DX AGE 24. PT STATED \"THEY JUST MONITOR IT MAINLY WHEN I AM PREGNANT.\" Notes she has a short P wave   • Placental abruption    • Shortened FL interval     PER PATIENT   • VSD (ventricular septal defect)     congenital      Past Surgical History Past Surgical History:   Procedure Laterality Date   •  SECTION     •  SECTION N/A 10/25/2017    Procedure:  SECTION REPEAT;  Surgeon: Delores Le MD;  Location: [facility] LABOR DELIVERY;  Service:    • D & C WITH SUCTION N/A 2021    Procedure: DILATATION AND CURETTAGE WITH SUCTION 10 WEEKS GESTATION;  Surgeon: Delores Le MD;  Location: [facility];  Service: Obstetrics/Gynecology;  Laterality: N/A;  x2   • DILATATION AND CURETTAGE  2017    MAB   • EXCISION MASS TRUNK N/A 2021    Procedure: EXCISION MASS TRUNK;  Surgeon: Delores Le MD;  Location: [facility];  Service: Obstetrics/Gynecology;  Laterality: N/A;      Family History: Family History   Problem Relation Age of Onset   • Mitral valve prolapse Sister    • Pneumonia Sister          age 24   • Arrhythmia Brother         complete heart block/ pacemaker age 4/  age 28   • Bradycardia Brother    • Congenital heart disease Daughter         possible complete heart block/ resolved   • Aortic stenosis Nephew  "   • Breast cancer Neg Hx    • Ovarian cancer Neg Hx    • Uterine cancer Neg Hx    • Colon cancer Neg Hx    • Malig Hyperthermia Neg Hx       Social History:  reports that she has never smoked. She has never used smokeless tobacco.   reports that she does not currently use alcohol.   reports no history of drug use.        Review of Systems   Constitutional: Negative for chills and fever.   Gastrointestinal: Negative for abdominal pain.   Genitourinary: Negative for dysuria, menstrual problem, vaginal bleeding, vaginal discharge and vaginal pain.   All other systems reviewed and are negative.        Objective     Vital Signs Range for the last 24 hours  Temperature: Temp:  [97.9 °F (36.6 °C)-98.6 °F (37 °C)] 97.9 °F (36.6 °C)   Temp Source: Temp src: Oral   BP: BP: (114-127)/(72-76) 114/76   Pulse: Heart Rate:  [71-80] 71   Respirations: Resp:  [16] 16   SPO2: SpO2:  [100 %] 100 %   O2 Amount (l/min):     O2 Devices Device (Oxygen Therapy): room air   Weight: Weight:  [64.4 kg (142 lb)] 64.4 kg (142 lb)     Physical Examination: General appearance - alert, well appearing, and in no distress  Abdomen - soft, nontender, nondistended, no masses or organomegaly  Musculoskeletal - no joint tenderness, deformity or swelling  Extremities - peripheral pulses normal, no pedal edema, no clubbing or cyanosis  Skin - normal coloration and turgor, no rashes, no suspicious skin lesions noted    Presentation: Vertex    Cervix: Exam by:     Dilation:     Effacement:     Station:       Fetal Heart Rate Assessment   Method: Fetal HR Assessment Method: external   Beats/min: Fetal HR (beats/min): 130   Baseline: Fetal HR Baseline: normal range   Variability: Fetal HR Variability: moderate (amplitude range 6 to 25 bpm)   Accels: Fetal HR Accelerations: greater than/equal to 15 bpm, lasting at least 15 seconds   Decels: Fetal HR Decelerations: absent   Tracing Category:       Uterine Assessment   Method:     Frequency (min):     Ctx Count  in 10 min:     Duration:     Intensity:     Intensity by IUPC:     Resting Tone:     Resting Tone by IUPC:     Paty Units:       Laboratory Results:  Results from last 7 days   Lab Units 11/10/22  0611   WBC 10*3/mm3 7.53   HEMOGLOBIN g/dL 10.4*   HEMATOCRIT % 30.0*   PLATELETS 10*3/mm3 214         Assessment & Plan       Heterozygous MTHFR mutation C677T    Family history of congenital heart defect    Mitral regurgitation of mother during pregnancy    History of placental abruption    History of 2  sections    Maternal anemia in pregnancy, antepartum    GBS (group B Streptococcus carrier), +RV culture, currently pregnant    Previous  delivery affecting pregnancy    Decreased fetal movements in third trimester      Assessment & Plan    Assessment:  1.  Intrauterine pregnancy at 37w6d gestation with reactive, reassuring fetal status.    2.  decreased fetal movement  3.  Obstetrical history significant for is remarkable for h/o placental abruption.  4.  GBS status:   Strep Gp B Culture   Date Value Ref Range Status   10/26/2022 Positive (A) Negative Final     Comment:     Centers for Disease Control and Prevention (CDC) and American Congress  of Obstetricians and Gynecologists (ACOG) guidelines for prevention of   group B streptococcal (GBS) disease specify co-collection of  a vaginal and rectal swab specimen to maximize sensitivity of GBS  detection. Per the CDC and ACOG, swabbing both the lower vagina and  rectum substantially increases the yield of detection compared with  sampling the vagina alone.  Penicillin G, ampicillin, or cefazolin are indicated for intrapartum  prophylaxis of  GBS colonization. Reflex susceptibility  testing should be performed prior to use of clindamycin only on GBS  isolates from penicillin-allergic women who are considered a high risk  for anaphylaxis. Treatment with vancomycin without additional testing  is warranted if resistance to clindamycin  is noted.         Plan:  1.  with Tubal scheduled  2. Plan of care has been reviewed with patient and family.  3.  Risks, benefits of treatment plan have been discussed.  4.  All questions have been answered.        Delores Le MD  11/10/2022  07:08 EST

## 2022-11-10 NOTE — ANESTHESIA PROCEDURE NOTES
Spinal Block      Patient reassessed immediately prior to procedure    Start Time: 11/10/2022 7:44 AM  Stop Time: 11/10/2022 7:47 AM  Performed By  Anesthesiologist: Lola Fletcher MD  Preanesthetic Checklist  Completed: patient identified, IV checked, site marked, risks and benefits discussed, surgical consent, monitors and equipment checked, pre-op evaluation and timeout performed  Spinal Block Prep:  Patient Position:sitting  Sterile Tech:cap, gloves and sterile barriers  Prep:Chloraprep  Patient Monitoring:EKG, continuous pulse oximetry and blood pressure monitoring    Spinal Block Procedure  Approach:midline  Guidance:palpation technique  Location:L4-L5  Needle Type:Rio  Needle Gauge:25 G  Placement of Spinal needle event:cerebrospinal fluid aspirated  Paresthesia: no  Fluid Appearance:clear  Medications: fentaNYL citrate (PF) (SUBLIMAZE) injection - Intrathecal   10 mcg - 11/10/2022 7:47:00 AM  Morphine PF injection - Intrathecal   100 mcg - 11/10/2022 7:47:00 AM  bupivacaine PF (MARCAINE) 0.75 % injection - Spinal   1.4 mL - 11/10/2022 7:47:00 AM   Post Assessment  Patient Tolerance:patient tolerated the procedure well with no apparent complications  Complications no

## 2022-11-10 NOTE — OP NOTE
I was scrubbed and actively participating as first assistant, including providing exposure, delivery assistance, hemostatic maneuvers and closure for Dr. Le.

## 2022-11-10 NOTE — LACTATION NOTE
P3T. First time breastfeeder . Baby is bringing fist to mouth but sleepy once latched. Practiced hand expression and colostrum flows freely. Lots of encouragement given . Baby was placed in MARIELA and then quickly moved herself to the right breast . She latched with no assistance but then slept there. Mom reassured that this is expected behavior in a brand new baby. Patient knows to offer breast every 2-3 hours and start feedings S2S in the first week of life.   Lactation Consult Note    Evaluation Completed: 11/10/2022 13:59 EST  Patient Name: Daphne Roberts  :  1990  MRN:  [id number]     REFERRAL  INFORMATION:                          Date of Referral: 11/10/22   Person Making Referral: nurse  Maternal Reason for Referral: breastfeeding currently, no prior breastfeeding experience       DELIVERY HISTORY:        Skin to skin initiation date/time:      Skin to skin end date/time:           MATERNAL ASSESSMENT:  Breast Size Issue: none (11/10/22 1300)  Breast Shape: round (11/10/22 1300)  Breast Density: soft (11/10/22 1300)  Areola: elastic (11/10/22 1300)  Nipples: everted (11/10/22 1300)     Left Nipple Symptoms: intact, nontender (11/10/22 1300)  Right Nipple Symptoms: intact, nontender (11/10/22 1300)       INFANT ASSESSMENT:  Information for the patient's :  Iman SepulvedaFrank [[id number]]   No past medical history on file.                                                                                                     MATERNAL INFANT FEEDING:     Maternal Emotional State: receptive (11/10/22 1300)  Infant Positioning: laid back (ventral) (11/10/22 1300)                  Milk Ejection Reflex: present (11/10/22 1300)  Comfort Measures Following Feeding: air-drying encouraged, breast cream/oil applied, expressed milk applied, soap use discouraged (11/10/22 1300)        Latch Assistance: minimal assistance (11/10/22 1300)                               EQUIPMENT TYPE:  Breast Pump Type:  double electric, personal (11/10/22 1300)                              BREAST PUMPING:          LACTATION REFERRALS:

## 2022-11-10 NOTE — ANESTHESIA PREPROCEDURE EVALUATION
" Anesthesia Evaluation     Patient summary reviewed and Nursing notes reviewed   NPO Solid Status: > 8 hours  NPO Liquid Status: > 2 hours           Airway   Mallampati: II  TM distance: >3 FB  Neck ROM: full  No difficulty expected  Dental      Pulmonary    (+) asthma,  Cardiovascular     (+) valvular problems/murmurs MVP,       Neuro/Psych  GI/Hepatic/Renal/Endo      Musculoskeletal     Abdominal    Substance History      OB/GYN    (+) Pregnant,         Other                        Anesthesia Plan    ASA 2     spinal     (I have reviewed the patient's history with the patient and the chart, including all pertinent laboratory results and imaging. I have explained the risks of spinal anesthesia including but not limited to hypotension, PDPH, nerve injury and risk of converting to GA. Patient understands risks and agrees to proceed.    /76 (BP Location: Left arm, Patient Position: Sitting)   Pulse 71   Temp 36.6 °C (97.9 °F) (Oral)   Resp 16   Ht 157.5 cm (62\")   LMP 02/18/2022 (Exact Date)   SpO2 100%   Breastfeeding Yes   BMI 25.97 kg/m²   )    Anesthetic plan, risks, benefits, and alternatives have been provided, discussed and informed consent has been obtained with: patient.        CODE STATUS:       "

## 2022-11-10 NOTE — OP NOTE
[facility]     11/10/2022    Patient:Daphne Roberts   MR#:[id number]     Section Procedure Note    Indications: previous  section low transverse and decreased fetal movement and history of placental abruption    Pre-operative Diagnosis: Intrauterine pregnancy at 37w6d    Post-operative Diagnosis: same    Procedure:    Repeat low transverse  section   Bilateral salpingectomy      Surgeon: Delores Le MD     Assistants: Miley Martin MD     Anesthesia: Spinal anesthesia    Prenatal care problem list:    Patient Active Problem List   Diagnosis   • Heterozygous MTHFR mutation C677T   • Family history of congenital heart defect   • Mitral regurgitation of mother during pregnancy   • History of placental abruption   • History of 2  sections   • Partial hydatidiform mole   • Endometriosis in scar of skin   • Nausea and vomiting in pregnancy   • Request for sterilization   • Maternal anemia in pregnancy, antepartum   • Pregnancy   • GBS (group B Streptococcus carrier), +RV culture, currently pregnant   • Previous  delivery affecting pregnancy   • Decreased fetal movements in third trimester   • S/P  section   • Status post bilateral salpingectomy       Procedure Details   The patient was seen in the LDR preoperatively. The risks, benefits, complications, treatment options, and expected outcomes were discussed with the patient.  The patient concurred with the proposed plan, giving informed consent.  The site of surgery is discussed. The patient was taken to Operating Room # 1, identified as Daphne Roberts and the procedure verified as  Delivery. A Time Out was held and the above information confirmed.    After induction of anesthesia, the patient was draped and prepped in the usual sterile manner. A Pfannenstiel incision was made and carried down through the subcutaneous tissue to the fascia. Fascial  incision was made and extended transversely. The fascia was  from the underlying rectus tissue superiorly and inferiorly. The peritoneum was identified and entered. Peritoneal incision was extended longitudinally. The utero-vesical peritoneal reflection was incised transversely and the bladder flap was bluntly freed from the lower uterine segment. A low transverse uterine incision was made. Delivered from kerwin breech presentation was a female  fetus 2740 g (6 lb 0.7 oz)  with Apgar scores of 7   at one minute and 9  at five minutes. Nuchal cord x 2 was reduced after delivery. After the umbilical cord was clamped and cut cord blood was obtained for evaluation. The placenta was removed intact and appeared normal. The uterine outline, tubes and ovaries appeared normal.  The uterine incision was closed with two layers running locked sutures of 0 Monocryl. Hemostasis was observed.     With the uterus exteriorized,  the tubes were identified and traced out to their fimbriated ends.  The Harmonic Scalpel was used to cauterize and ligate  the left and right tube approximately 3 cm from the cornual region.  Hemostasis is observed.     The fascia was then reapproximated with running sutures of 0 Vicryl. The subcutaneous layer was reapproximated with 3-0 Monocryl and the skin was reapproximated with 4-0 monocryl in a subcuticular fashion.     Instrument, sponge, and needle counts were correct prior the abdominal closure and at the conclusion of the case.       Findings:  Normal uterus, fallopian tubes and ovaries       Quantatative Blood Loss:   See nursing flow sheet                     Specimens:  Placenta            Complications:  None; patient tolerated the procedure well.           Disposition: PACU - hemodynamically stable.           Condition: stable    Attending Attestation: I was present and scrubbed for the entire procedure.      DR. Martin   was responsible for performing the following activities:  Retraction, Suction and Irrigation and their skilled assistance was necessary for the success of this case.      Delores Le MD  11/10/2022 08:47 EST

## 2022-11-11 LAB
BASOPHILS # BLD AUTO: 0.03 10*3/MM3 (ref 0–0.2)
BASOPHILS NFR BLD AUTO: 0.5 % (ref 0–1.5)
DEPRECATED RDW RBC AUTO: 50.6 FL (ref 37–54)
EOSINOPHIL # BLD AUTO: 0.17 10*3/MM3 (ref 0–0.4)
EOSINOPHIL NFR BLD AUTO: 2.7 % (ref 0.3–6.2)
ERYTHROCYTE [DISTWIDTH] IN BLOOD BY AUTOMATED COUNT: 15.9 % (ref 12.3–15.4)
HCT VFR BLD AUTO: 27.4 % (ref 34–46.6)
HGB BLD-MCNC: 9.4 G/DL (ref 12–15.9)
IMM GRANULOCYTES # BLD AUTO: 0.04 10*3/MM3 (ref 0–0.05)
IMM GRANULOCYTES NFR BLD AUTO: 0.6 % (ref 0–0.5)
LAB AP CASE REPORT: NORMAL
LYMPHOCYTES # BLD AUTO: 0.89 10*3/MM3 (ref 0.7–3.1)
LYMPHOCYTES NFR BLD AUTO: 14.1 % (ref 19.6–45.3)
MCH RBC QN AUTO: 29.7 PG (ref 26.6–33)
MCHC RBC AUTO-ENTMCNC: 34.3 G/DL (ref 31.5–35.7)
MCV RBC AUTO: 86.4 FL (ref 79–97)
MONOCYTES # BLD AUTO: 0.4 10*3/MM3 (ref 0.1–0.9)
MONOCYTES NFR BLD AUTO: 6.3 % (ref 5–12)
NEUTROPHILS NFR BLD AUTO: 4.79 10*3/MM3 (ref 1.7–7)
NEUTROPHILS NFR BLD AUTO: 75.8 % (ref 42.7–76)
NRBC BLD AUTO-RTO: 0.2 /100 WBC (ref 0–0.2)
PATH REPORT.FINAL DX SPEC: NORMAL
PATH REPORT.GROSS SPEC: NORMAL
PLATELET # BLD AUTO: 180 10*3/MM3 (ref 140–450)
PMV BLD AUTO: 11.7 FL (ref 6–12)
RBC # BLD AUTO: 3.17 10*6/MM3 (ref 3.77–5.28)
WBC NRBC COR # BLD: 6.32 10*3/MM3 (ref 3.4–10.8)

## 2022-11-11 PROCEDURE — 0503F POSTPARTUM CARE VISIT: CPT | Performed by: OBSTETRICS & GYNECOLOGY

## 2022-11-11 PROCEDURE — 85025 COMPLETE CBC W/AUTO DIFF WBC: CPT | Performed by: OBSTETRICS & GYNECOLOGY

## 2022-11-11 PROCEDURE — 25010000002 KETOROLAC TROMETHAMINE PER 15 MG: Performed by: OBSTETRICS & GYNECOLOGY

## 2022-11-11 RX ADMIN — ACETAMINOPHEN 1000 MG: 500 TABLET ORAL at 00:52

## 2022-11-11 RX ADMIN — ACETAMINOPHEN 650 MG: 325 TABLET, FILM COATED ORAL at 14:43

## 2022-11-11 RX ADMIN — Medication 1 TABLET: at 08:32

## 2022-11-11 RX ADMIN — ACETAMINOPHEN 650 MG: 325 TABLET, FILM COATED ORAL at 20:41

## 2022-11-11 RX ADMIN — KETOROLAC TROMETHAMINE 15 MG: 15 INJECTION, SOLUTION INTRAMUSCULAR; INTRAVENOUS at 03:59

## 2022-11-11 RX ADMIN — IBUPROFEN 600 MG: 600 TABLET, FILM COATED ORAL at 23:49

## 2022-11-11 RX ADMIN — DOCUSATE SODIUM 50MG AND SENNOSIDES 8.6MG 1 TABLET: 8.6; 5 TABLET, FILM COATED ORAL at 20:41

## 2022-11-11 RX ADMIN — ACETAMINOPHEN 1000 MG: 500 TABLET ORAL at 08:32

## 2022-11-11 RX ADMIN — DOCUSATE SODIUM 50MG AND SENNOSIDES 8.6MG 1 TABLET: 8.6; 5 TABLET, FILM COATED ORAL at 08:32

## 2022-11-11 NOTE — PROGRESS NOTES
[facility]   Obstetrics and Gynecology     2022    Patient: Daphne Roberts   MR#:[id number]        Progress note         HD#1  Post-Op Day 1 S/P    Delivered a female infant.    Subjective     Daphne Roberts is a 32 y.o. female  post operative from CS at 37w6d weeks  Patient reports:  Pain is well controlled. Voiding and ambulating without difficulty.  Tolerating po. Lochia normal.     Breast/bottle The patient is currently breastfeeding.    Patient Active Problem List   Diagnosis   • Heterozygous MTHFR mutation C677T   • Family history of congenital heart defect   • Mitral regurgitation of mother during pregnancy   • History of placental abruption   • History of 2  sections   • Partial hydatidiform mole   • Endometriosis in scar of skin   • Nausea and vomiting in pregnancy   • Request for sterilization   • Maternal anemia in pregnancy, antepartum   • Pregnancy   • GBS (group B Streptococcus carrier), +RV culture, currently pregnant   • Previous  delivery affecting pregnancy   • Decreased fetal movements in third trimester   • S/P  section   • Status post bilateral salpingectomy        Objective      Vital Signs Range for the last 24 hours    Temperature: Temp:  [97.6 °F (36.4 °C)-98.7 °F (37.1 °C)] 97.9 °F (36.6 °C)  BP:  BP: (100-117)/(50-77) 114/75  Pulse:  Heart Rate:  [63-98] 71  Respirations: Resp:  [16] 16  Weight:     BMI:  Body mass index is 25.97 kg/m².    I/O last 3 completed shifts:  In:  [I.V.:]  Out: 4 [Urine:2725; Blood:109]  No intake/output data recorded.     Physical Exam    LABS:    Results from last 7 days   Lab Units 22  0539 11/10/22  0611 22  0948   WBC 10*3/mm3 6.32 7.53 9.63   HEMOGLOBIN g/dL 9.4* 10.4* 11.4*   HEMATOCRIT % 27.4* 30.0* 33.6*   PLATELETS 10*3/mm3 180 214 230             Assessment & Plan     1.  POD #1 S/P C/S:  Hemodynamically stable.  Doing well.     S/P   section    Heterozygous MTHFR mutation C677T    Family history of congenital heart defect    Mitral regurgitation of mother during pregnancy    History of placental abruption    History of 2  sections    Maternal anemia in pregnancy, antepartum    GBS (group B Streptococcus carrier), +RV culture, currently pregnant    Previous  delivery affecting pregnancy    Decreased fetal movements in third trimester    Status post bilateral salpingectomy        Plan:    Continue routine postoperative care           Wm Barraza MD  2022  08:40 EST

## 2022-11-11 NOTE — LACTATION NOTE
This note was copied from a baby's chart.  Mom reports she has switched to formula feeding and she might start pumping after d/c. She has personal pump at home. Encouraged to call for any questions, encourage pumping every 3hrs.

## 2022-11-11 NOTE — PLAN OF CARE
Goal Outcome Evaluation:  Plan of Care Reviewed With: patient, spouse           Outcome Evaluation: stable. pain controlled with po meds and duramorph.  very unsure about breastfeeding. given formula per request. questions answered. no c/o or concerns voiced at present.

## 2022-11-12 VITALS
BODY MASS INDEX: 25.97 KG/M2 | RESPIRATION RATE: 18 BRPM | DIASTOLIC BLOOD PRESSURE: 75 MMHG | HEIGHT: 62 IN | OXYGEN SATURATION: 97 % | HEART RATE: 75 BPM | TEMPERATURE: 98.8 F | SYSTOLIC BLOOD PRESSURE: 110 MMHG

## 2022-11-12 PROCEDURE — 0503F POSTPARTUM CARE VISIT: CPT | Performed by: OBSTETRICS & GYNECOLOGY

## 2022-11-12 RX ORDER — IBUPROFEN 600 MG/1
600 TABLET ORAL EVERY 6 HOURS
Qty: 30 TABLET | Refills: 0 | Status: SHIPPED | OUTPATIENT
Start: 2022-11-12 | End: 2022-11-28

## 2022-11-12 RX ADMIN — DOCUSATE SODIUM 50MG AND SENNOSIDES 8.6MG 1 TABLET: 8.6; 5 TABLET, FILM COATED ORAL at 09:48

## 2022-11-12 RX ADMIN — Medication 1 TABLET: at 09:48

## 2022-11-12 RX ADMIN — ACETAMINOPHEN 650 MG: 325 TABLET, FILM COATED ORAL at 09:48

## 2022-11-12 RX ADMIN — IBUPROFEN 600 MG: 600 TABLET, FILM COATED ORAL at 06:30

## 2022-11-12 NOTE — DISCHARGE SUMMARY
[facility]   Obstetrics and Gynecology    Section Discharge Summary    Date of Admission: 11/10/2022  Date of Discharge:        Patient: Daphne Roberts      MR#:[id number]    Surgeon/OB: Delores Le MD    Discharge Diagnosis:    section at 37w6d, uncomplicated recovery    S/P  section    Heterozygous MTHFR mutation C677T    Family history of congenital heart defect    Mitral regurgitation of mother during pregnancy    History of placental abruption    History of 2  sections    Maternal anemia in pregnancy, antepartum    GBS (group B Streptococcus carrier), +RV culture, currently pregnant    Previous  delivery affecting pregnancy    Decreased fetal movements in third trimester    Status post bilateral salpingectomy        Procedures:  , Low Transverse     11/10/2022    8:04 AM      Anesthesia:  Spinal     Presenting Problem/History of Present Illness  Previous  delivery affecting pregnancy [O34.219]  S/P  section [Z98.891]     Patient Active Problem List   Diagnosis   • Heterozygous MTHFR mutation C677T   • Family history of congenital heart defect   • Mitral regurgitation of mother during pregnancy   • History of placental abruption   • History of 2  sections   • Partial hydatidiform mole   • Endometriosis in scar of skin   • Nausea and vomiting in pregnancy   • Request for sterilization   • Maternal anemia in pregnancy, antepartum   • Pregnancy   • GBS (group B Streptococcus carrier), +RV culture, currently pregnant   • Previous  delivery affecting pregnancy   • Decreased fetal movements in third trimester   • S/P  section   • Status post bilateral salpingectomy       Hospital Course  Patient is a 32 y.o. female  at 37w6d status post  section with uneventful postoperative recovery.  Patient was advanced to regular diet on postoperative day#1.  On discharge, ambulating, tolerating a  regular diet without any difficulties and her incision is dry, clean and intact.     Infant:   female  fetus 2740 g (6 lb 0.7 oz)  with Apgar scores of 7 , 9  at five minutes.    Condition on Discharge:  Stable    Vital Signs  Temp:  [98.5 °F (36.9 °C)-98.8 °F (37.1 °C)] 98.8 °F (37.1 °C)  Heart Rate:  [70-79] 75  Resp:  [16-18] 18  BP: (103-114)/(62-75) 110/75    Results from last 7 days   Lab Units 22  0539 11/10/22  0611   WBC 10*3/mm3 6.32 7.53   HEMOGLOBIN g/dL 9.4* 10.4*   HEMATOCRIT % 27.4* 30.0*   PLATELETS 10*3/mm3 180 214             Discharge Disposition  Home or Self Care    Discharge Medications     Your medication list      START taking these medications      Instructions Last Dose Given Next Dose Due   ibuprofen 600 MG tablet  Commonly known as: ADVIL,MOTRIN      Take 1 tablet by mouth Every 6 (Six) Hours.          CONTINUE taking these medications      Instructions Last Dose Given Next Dose Due   ferrous sulfate 325 (65 FE) MG tablet      Take 1 tablet by mouth Daily With Breakfast.       prenatal (CLASSIC) vitamin  tablet  Generic drug: prenatal vitamin      Take  by mouth Daily.          STOP taking these medications    acetaminophen 500 MG tablet  Commonly known as: TYLENOL        aspirin 81 MG EC tablet        doxylamine 25 MG tablet  Commonly known as: UNISOM        ondansetron 4 MG tablet  Commonly known as: Zofran        vitamin B-6 100 MG tablet  Commonly known as: PYRIDOXINE              Where to Get Your Medications      These medications were sent to [facility] [id number] - [place], [place] - [address] [facility] [phone] [facility] [phone]   [address], [facility] [address]    Phone: [phone]   · ibuprofen 600 MG tablet           Discharge Diet: Regular    Follow-up Appointments  Future Appointments   Date Time Provider Department Center   2022  8:45 AM Delores Le MD [facility]   2022  8:45 AM Delores Le MD [facility]  [facility]         Prenatal labs/vax:   Immunization History   Administered Date(s) Administered   • Fluzone Quad >6mos (Multi-dose) 10/10/2017   • Tdap 10/04/2017, 09/19/2022       External Prenatal Results     Pregnancy Outside Results - Transcribed From Office Records - See Scanned Records For Details     Test Value Date Time    ABO  A  11/10/22 0611    Rh  Positive  11/10/22 0611    Antibody Screen  Negative  11/10/22 0611       Negative  11/04/22 0948       Negative  04/11/22 0954    Varicella IgG  1,069 index 04/11/22 0954    Rubella  1.04 index 04/11/22 0954    Hgb  9.4 g/dL 11/11/22 0539       10.4 g/dL 11/10/22 0611       11.4 g/dL 11/04/22 0948       9.7 g/dL 10/03/22 1406       9.9 g/dL 08/29/22 1149       12.1 g/dL 04/11/22 0954    Hct  27.4 % 11/11/22 0539       30.0 % 11/10/22 0611       33.6 % 11/04/22 0948       28.6 % 10/03/22 1406       30.3 % 08/29/22 1149       35.7 % 04/11/22 0954    Glucose Fasting GTT       Glucose Tolerance Test 1 hour       Glucose Tolerance Test 3 hour       Gonorrhea (discrete)  Negative  04/11/22 1042    Chlamydia (discrete)  Negative  04/11/22 1042    RPR  Non Reactive  04/11/22 0954    VDRL       Syphilis Antibody       HBsAg  Negative  04/11/22 0954    Herpes Simplex Virus PCR       Herpes Simplex VIrus Culture       HIV  Non Reactive  04/11/22 0954    Hep C RNA Quant PCR       Hep C Antibody  0.1 s/co ratio 04/11/22 0954    AFP       Group B Strep  Positive  10/26/22 0947    GBS Susceptibility to Clindamycin       GBS Susceptibility to Erythromycin       Fetal Fibronectin       Genetic Testing, Maternal Blood ^ declined  07/14/17           Drug Screening     Test Value Date Time    Urine Drug Screen       Amphetamine Screen       Barbiturate Screen       Benzodiazepine Screen       Methadone Screen       Phencyclidine Screen       Opiates Screen       THC Screen       Cocaine Screen       Propoxyphene Screen       Buprenorphine Screen       Methamphetamine Screen        Oxycodone Screen       Tricyclic Antidepressants Screen             Legend    ^: Historical                          Ezra Melara MD  11/12/2022  11:59 EST

## 2022-11-12 NOTE — LACTATION NOTE
Milk in . Mom says baby not latching so she plans to pump and bottle feed. LC encouraged her to work on latch and to express/pump x 5 mins and then try latching . Pumping is so much work and this is her third baby but first to breastfeed. [facility] and contact numbers on WB.

## 2022-11-12 NOTE — PROGRESS NOTES
[facility]   PROGRESS NOTE    Post-Op Day 2 S/P     Subjective   CC: post  section    Patient reports:  Patient notes that she is feeling great.  She is taking Motrin and Tylenol only.  She would like to be discharged home today.  She is passing gas.  No depression.  No leg pain or headaches.    Review of systems- no shortness of breath, nausea or vomiting or leg pain.    Objective      Vitals: Vital Signs Range for the last 24 hours  Temperature: Temp:  [97.7 °F (36.5 °C)-98.8 °F (37.1 °C)] 98.8 °F (37.1 °C)       BP: BP: (103-114)/(62-75) 110/75   Pulse: Heart Rate:  [70-79] 75   Respirations: Resp:  [16-18] 18                            Physical exam   General-  no acute distress, conversant    Lungs- respirations unlabored   CV- trace LE edema   Abdomen- soft, nontender, nondistended.  Fundus is firm.   Incision -  Clean, dry, and intact with no erythema.  There is mild bruising below the incision.   Lower extremities- nontender bilaterally    Results from last 7 days   Lab Units 22  0539   WBC 10*3/mm3 6.32   HEMOGLOBIN g/dL 9.4*   HEMATOCRIT % 27.4*   PLATELETS 10*3/mm3 180         Assessment & Plan        S/P  section    Heterozygous MTHFR mutation C677T    Family history of congenital heart defect    Mitral regurgitation of mother during pregnancy    History of placental abruption    History of 2  sections    Maternal anemia in pregnancy, antepartum    GBS (group B Streptococcus carrier), +RV culture, currently pregnant    Previous  delivery affecting pregnancy    Decreased fetal movements in third trimester    Status post bilateral salpingectomy      Assessment:    Daphne Roberts is Day 2  post-op from     Patient is doing well and desires discharge home today.  Discussed pelvic rest and no heavy lifting for 6 weeks.  No driving for 2 weeks.  Patient is doing well with Motrin and Percocet.  She declines narcotics at time of  discharge.  Encouraged ambulation.  Discussed signs and symptoms of DVT to watch for.             Ezra Melara MD  11/12/2022  11:46 EST

## 2022-11-28 ENCOUNTER — POSTPARTUM VISIT (OUTPATIENT)
Dept: OBSTETRICS AND GYNECOLOGY | Age: 32
End: 2022-11-28

## 2022-11-28 VITALS
HEIGHT: 62 IN | BODY MASS INDEX: 23.19 KG/M2 | WEIGHT: 126 LBS | DIASTOLIC BLOOD PRESSURE: 72 MMHG | SYSTOLIC BLOOD PRESSURE: 116 MMHG

## 2022-11-28 DIAGNOSIS — Z98.891 S/P CESAREAN SECTION: ICD-10-CM

## 2022-11-28 DIAGNOSIS — Z90.79 STATUS POST BILATERAL SALPINGECTOMY: ICD-10-CM

## 2022-11-28 PROBLEM — Z34.90 PREGNANCY: Status: RESOLVED | Noted: 2022-10-03 | Resolved: 2022-11-28

## 2022-11-28 PROBLEM — O21.9 NAUSEA AND VOMITING IN PREGNANCY: Status: RESOLVED | Noted: 2022-05-02 | Resolved: 2022-11-28

## 2022-11-28 PROBLEM — O99.419: Status: RESOLVED | Noted: 2017-07-14 | Resolved: 2022-11-28

## 2022-11-28 PROBLEM — O34.219 PREVIOUS CESAREAN DELIVERY AFFECTING PREGNANCY: Status: RESOLVED | Noted: 2022-11-10 | Resolved: 2022-11-28

## 2022-11-28 PROBLEM — O99.820 GBS (GROUP B STREPTOCOCCUS CARRIER), +RV CULTURE, CURRENTLY PREGNANT: Status: RESOLVED | Noted: 2022-11-01 | Resolved: 2022-11-28

## 2022-11-28 PROBLEM — I34.0: Status: RESOLVED | Noted: 2017-07-14 | Resolved: 2022-11-28

## 2022-11-28 PROBLEM — Z30.2 REQUEST FOR STERILIZATION: Status: RESOLVED | Noted: 2022-08-29 | Resolved: 2022-11-28

## 2022-11-28 PROBLEM — N80.6 ENDOMETRIOSIS IN SCAR OF SKIN: Status: RESOLVED | Noted: 2021-03-08 | Resolved: 2022-11-28

## 2022-11-28 PROBLEM — O36.8130 DECREASED FETAL MOVEMENTS IN THIRD TRIMESTER: Status: RESOLVED | Noted: 2022-11-10 | Resolved: 2022-11-28

## 2022-11-28 PROBLEM — O01.1 PARTIAL HYDATIDIFORM MOLE: Status: RESOLVED | Noted: 2021-03-01 | Resolved: 2022-11-28

## 2022-11-28 PROBLEM — O99.019 MATERNAL ANEMIA IN PREGNANCY, ANTEPARTUM: Status: RESOLVED | Noted: 2022-08-30 | Resolved: 2022-11-28

## 2022-11-28 PROBLEM — Z87.59 HISTORY OF PLACENTAL ABRUPTION: Status: RESOLVED | Noted: 2021-01-25 | Resolved: 2022-11-28

## 2022-11-28 PROCEDURE — 0503F POSTPARTUM CARE VISIT: CPT | Performed by: OBSTETRICS & GYNECOLOGY

## 2022-11-28 NOTE — PROGRESS NOTES
"Subjective   Daphne Roberts is a 32 y.o. female who presents for a postpartum visit. She is 2 weeks postpartum following a low cervical transverse  section. I have fully reviewed the prenatal and intrapartum course. The delivery was at 38 gestational weeks. Outcome: repeat  section, low transverse incision. Anesthesia: spinal. Postpartum course has been uncomplicated. Baby's course has been uncomplicated. Baby is feeding by breast. Bleeding no bleeding. Bowel function is normal. Bladder function is normal. Patient is not sexually active. Contraception method is tubal ligation. Postpartum depression screening: negative.    The following portions of the patient's history were reviewed and updated as appropriate: allergies, current medications, past family history, past medical history, past social history, past surgical history and problem list.    Review of Systems  Pertinent items are noted in HPI.    Objective   /72   Ht 157.5 cm (62\")   Wt 57.2 kg (126 lb)   LMP 2022 (Exact Date)   Breastfeeding Yes   BMI 23.05 kg/m²    General:  alert, appears stated age and cooperative   Abdomen: soft, non-tender; bowel sounds normal; no masses,  no organomegaly and Inc C/D/I      Assessment & Plan   postpartum exam. Pap smear not done at today's visit.    1. Contraception: tubal ligation  2. Follow up in: 4 weeks or as needed.           "

## 2022-12-08 ENCOUNTER — TELEPHONE (OUTPATIENT)
Dept: OBSTETRICS AND GYNECOLOGY | Age: 32
End: 2022-12-08

## 2023-01-06 ENCOUNTER — POSTPARTUM VISIT (OUTPATIENT)
Dept: OBSTETRICS AND GYNECOLOGY | Age: 33
End: 2023-01-06
Payer: COMMERCIAL

## 2023-01-06 VITALS
BODY MASS INDEX: 24.66 KG/M2 | SYSTOLIC BLOOD PRESSURE: 112 MMHG | WEIGHT: 134 LBS | HEIGHT: 62 IN | DIASTOLIC BLOOD PRESSURE: 74 MMHG

## 2023-01-06 DIAGNOSIS — Z90.79 STATUS POST BILATERAL SALPINGECTOMY: ICD-10-CM

## 2023-01-06 PROBLEM — Z82.79 FAMILY HISTORY OF CONGENITAL HEART DEFECT: Status: RESOLVED | Noted: 2017-06-06 | Resolved: 2023-01-06

## 2023-01-06 PROCEDURE — 0503F POSTPARTUM CARE VISIT: CPT | Performed by: OBSTETRICS & GYNECOLOGY

## 2023-01-06 NOTE — PROGRESS NOTES
Subjective   Daphne Roberts is a 32 y.o. female who presents for a postpartum visit. She is 8 weeks postpartum following a low cervical transverse  section. I have fully reviewed the prenatal and intrapartum course. The delivery was at 37-6 gestational weeks. Outcome: repeat  section, low transverse incision. Anesthesia: spinal. Postpartum course has been uncomplicated. Baby's course has been uncomplicated. Baby is feeding by breast. Bleeding no bleeding. Bowel function is normal. Bladder function is normal. Patient is sexually active. Contraception method is tubal ligation. Postpartum depression screening: negative.    The following portions of the patient's history were reviewed and updated as appropriate: allergies, current medications, past family history, past medical history, past social history, past surgical history and problem list.    Review of Systems  Pertinent items are noted in HPI.    Objective   /74   Ht 157.5 cm (62\")   Wt 60.8 kg (134 lb)   LMP 2022 (Exact Date)   Breastfeeding Yes   BMI 24.51 kg/m²    General:  alert, appears stated age and cooperative   Abdomen: soft, non-tender; bowel sounds normal; no masses,  no organomegaly and Inc healed well      Assessment & Plan   postpartum exam. Pap smear not done at today's visit.    1. Contraception: tubal ligation  2. Follow up in: 6 months or as needed.

## (undated) DEVICE — 3M™ MEDIPORE™ H SOFT CLOTH SURGICAL TAPE 2864, 4 INCH X 10 YARD (10CM X 9,14M), 12 ROLLS/CASE: Brand: 3M™ MEDIPORE™

## (undated) DEVICE — SUT MNCRYL 0/0 CTX 36IN Y398H

## (undated) DEVICE — NDL HYPO PRECISIONGLIDE REG 25G 1 1/2

## (undated) DEVICE — SUT MNCRYL 3/0 CT1 36 IN Y944H

## (undated) DEVICE — SUT PDS LP 0 CTX VIO 60IN Z990G

## (undated) DEVICE — TP SXN VACURETTE CRV 11MM

## (undated) DEVICE — Device

## (undated) DEVICE — LOU D & C HYSTEROSCOPY: Brand: MEDLINE INDUSTRIES, INC.

## (undated) DEVICE — 3M(TM) TEGADERM(TM) TRANSPARENT FILM DRESSING FRAME STYLE 1627: Brand: 3M™ TEGADERM™

## (undated) DEVICE — SACK GZ PERM

## (undated) DEVICE — LOU MINOR PROCEDURE: Brand: MEDLINE INDUSTRIES, INC.

## (undated) DEVICE — GLV SURG BIOGEL LTX PF 6 1/2

## (undated) DEVICE — SOL IRR H2O BTL 1000ML STRL

## (undated) DEVICE — UNDYED BRAIDED (POLYGLACTIN 910), SYNTHETIC ABSORBABLE SUTURE: Brand: COATED VICRYL

## (undated) DEVICE — KIWI® VACUUM DELIVERY SYSTEM PROCUP®: Brand: KIWI® PROCUP®

## (undated) DEVICE — ANTIBACTERIAL UNDYED BRAIDED (POLYGLACTIN 910), SYNTHETIC ABSORBABLE SUTURE: Brand: COATED VICRYL

## (undated) DEVICE — SLV SCD CALF HEMOFORCE DVT THERP REPROC MD

## (undated) DEVICE — SUT VIC 0 CT1 36IN J946H

## (undated) DEVICE — HOSE BT TO BT VAC CURETTAGE SNGL PT USE

## (undated) DEVICE — HARMONIC ACE +7 SHEARS ADVANCED HEMOSTASIS 5MM DIAMETER 23CM SHAFT LENGTH  FOR USE WITH GRAY HAND PIECE ONLY: Brand: HARMONIC ACE

## (undated) DEVICE — STRAP STIRUP WO/ RNG

## (undated) DEVICE — CANSTR SXN UTER NO FLTR SURG

## (undated) DEVICE — KENDALL SCD EXPRESS SLEEVES, KNEE LENGTH, MEDIUM: Brand: KENDALL SCD

## (undated) DEVICE — ST COL BERKELY TBG

## (undated) DEVICE — TBG W FLTR FOR BERKELEY SYSTEM